# Patient Record
Sex: MALE | Race: WHITE | URBAN - METROPOLITAN AREA
[De-identification: names, ages, dates, MRNs, and addresses within clinical notes are randomized per-mention and may not be internally consistent; named-entity substitution may affect disease eponyms.]

---

## 2019-01-16 ENCOUNTER — APPOINTMENT (RX ONLY)
Dept: URBAN - METROPOLITAN AREA CLINIC 170 | Facility: CLINIC | Age: 49
Setting detail: DERMATOLOGY
End: 2019-01-16

## 2019-01-16 DIAGNOSIS — L81.4 OTHER MELANIN HYPERPIGMENTATION: ICD-10-CM

## 2019-01-16 DIAGNOSIS — L30.0 NUMMULAR DERMATITIS: ICD-10-CM

## 2019-01-16 DIAGNOSIS — D22 MELANOCYTIC NEVI: ICD-10-CM

## 2019-01-16 DIAGNOSIS — L91.8 OTHER HYPERTROPHIC DISORDERS OF THE SKIN: ICD-10-CM

## 2019-01-16 PROBLEM — I10 ESSENTIAL (PRIMARY) HYPERTENSION: Status: ACTIVE | Noted: 2019-01-16

## 2019-01-16 PROBLEM — D22.5 MELANOCYTIC NEVI OF TRUNK: Status: ACTIVE | Noted: 2019-01-16

## 2019-01-16 PROCEDURE — ? COUNSELING

## 2019-01-16 PROCEDURE — ? ADDITIONAL NOTES

## 2019-01-16 PROCEDURE — ? PRESCRIPTION

## 2019-01-16 PROCEDURE — ? BENIGN DESTRUCTION COSMETIC

## 2019-01-16 PROCEDURE — 99202 OFFICE O/P NEW SF 15 MIN: CPT

## 2019-01-16 RX ORDER — FLUOCINONIDE 0.5 MG/G
OINTMENT TOPICAL
Qty: 1 | Refills: 2 | Status: ERX | COMMUNITY
Start: 2019-01-16

## 2019-01-16 RX ADMIN — FLUOCINONIDE: 0.5 OINTMENT TOPICAL at 19:29

## 2019-01-16 ASSESSMENT — LOCATION SIMPLE DESCRIPTION DERM
LOCATION SIMPLE: LEFT THIGH
LOCATION SIMPLE: RIGHT THIGH
LOCATION SIMPLE: ABDOMEN
LOCATION SIMPLE: CHEST
LOCATION SIMPLE: GROIN

## 2019-01-16 ASSESSMENT — LOCATION ZONE DERM
LOCATION ZONE: TRUNK
LOCATION ZONE: LEG

## 2019-01-16 ASSESSMENT — LOCATION DETAILED DESCRIPTION DERM
LOCATION DETAILED: LEFT ANTERIOR MEDIAL PROXIMAL THIGH
LOCATION DETAILED: RIGHT ANTERIOR MEDIAL PROXIMAL THIGH
LOCATION DETAILED: EPIGASTRIC SKIN
LOCATION DETAILED: SUPRAPUBIC SKIN
LOCATION DETAILED: LOWER STERNUM

## 2019-01-16 NOTE — PROCEDURE: BENIGN DESTRUCTION COSMETIC
Anesthesia Type: 2% Xylocaine with 1:100,000 epinephrine
Detail Level: Detailed
Anesthesia Volume In Cc: 0
Price (Use Numbers Only, No Special Characters Or $): 60
Post-Care Instructions: I reviewed with the patient in detail post-care instructions. Patient is to wear sunprotection, and avoid picking at any of the treated lesions. Pt may apply Vaseline to crusted or scabbing areas.
Consent: The patient's consent was obtained including but not limited to risks of crusting, scabbing, blistering, scarring, darker or lighter pigmentary change, recurrence, incomplete removal and infection.

## 2019-01-16 NOTE — HPI: RASH
What Type Of Note Output Would You Prefer (Optional)?: Standard Output
How Severe Is Your Rash?: moderate
Is This A New Presentation, Or A Follow-Up?: Rash
Additional History: Every spring patient gets vasculitis and every winter it gets red scaly and itches. Triamcinolone ointment twice a day with no relief. \\n\\nRight hand middle finger first time this winter peeling, burning, painful.  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 \\n\\nAlso has irritated skin tags wants removed. Very aggravating.

## 2019-01-24 ENCOUNTER — OFFICE VISIT (OUTPATIENT)
Dept: PULMONOLOGY | Age: 49
End: 2019-01-24
Payer: COMMERCIAL

## 2019-01-24 VITALS
DIASTOLIC BLOOD PRESSURE: 84 MMHG | WEIGHT: 315 LBS | HEART RATE: 85 BPM | SYSTOLIC BLOOD PRESSURE: 129 MMHG | TEMPERATURE: 98.6 F | RESPIRATION RATE: 18 BRPM | HEIGHT: 76 IN | OXYGEN SATURATION: 94 % | BODY MASS INDEX: 38.36 KG/M2

## 2019-01-24 DIAGNOSIS — G47.33 OSA (OBSTRUCTIVE SLEEP APNEA): Primary | ICD-10-CM

## 2019-01-24 DIAGNOSIS — Z72.821 INADEQUATE SLEEP HYGIENE: ICD-10-CM

## 2019-01-24 DIAGNOSIS — E66.01 MORBID OBESITY (HCC): ICD-10-CM

## 2019-01-24 DIAGNOSIS — I10 ESSENTIAL HYPERTENSION: ICD-10-CM

## 2019-01-24 PROCEDURE — 99244 OFF/OP CNSLTJ NEW/EST MOD 40: CPT | Performed by: INTERNAL MEDICINE

## 2019-01-24 RX ORDER — LOSARTAN POTASSIUM 50 MG/1
50 TABLET ORAL DAILY
COMMUNITY
End: 2020-06-01 | Stop reason: CLARIF

## 2019-01-24 ASSESSMENT — SLEEP AND FATIGUE QUESTIONNAIRES
HOW LIKELY ARE YOU TO NOD OFF OR FALL ASLEEP WHILE SITTING INACTIVE IN A PUBLIC PLACE: 3
HOW LIKELY ARE YOU TO NOD OFF OR FALL ASLEEP WHILE SITTING AND TALKING TO SOMEONE: 2
HOW LIKELY ARE YOU TO NOD OFF OR FALL ASLEEP IN A CAR, WHILE STOPPED FOR A FEW MINUTES IN TRAFFIC: 2
HOW LIKELY ARE YOU TO NOD OFF OR FALL ASLEEP WHILE WATCHING TV: 3
ESS TOTAL SCORE: 22
HOW LIKELY ARE YOU TO NOD OFF OR FALL ASLEEP WHILE SITTING AND READING: 3
HOW LIKELY ARE YOU TO NOD OFF OR FALL ASLEEP WHILE LYING DOWN TO REST IN THE AFTERNOON WHEN CIRCUMSTANCES PERMIT: 3
HOW LIKELY ARE YOU TO NOD OFF OR FALL ASLEEP WHEN YOU ARE A PASSENGER IN A CAR FOR AN HOUR WITHOUT A BREAK: 3
HOW LIKELY ARE YOU TO NOD OFF OR FALL ASLEEP WHILE SITTING QUIETLY AFTER LUNCH WITHOUT ALCOHOL: 3
NECK CIRCUMFERENCE (INCHES): 19

## 2019-02-06 ENCOUNTER — HOSPITAL ENCOUNTER (OUTPATIENT)
Dept: SLEEP CENTER | Age: 49
Discharge: HOME | End: 2019-02-08
Payer: COMMERCIAL

## 2019-02-06 DIAGNOSIS — E66.01 MORBID OBESITY (HCC): ICD-10-CM

## 2019-02-06 DIAGNOSIS — G47.33 OSA (OBSTRUCTIVE SLEEP APNEA): ICD-10-CM

## 2019-02-06 PROCEDURE — 95806 SLEEP STUDY UNATT&RESP EFFT: CPT

## 2019-02-07 PROCEDURE — 95806 SLEEP STUDY UNATT&RESP EFFT: CPT

## 2019-02-14 ENCOUNTER — OFFICE VISIT (OUTPATIENT)
Dept: PULMONOLOGY | Age: 49
End: 2019-02-14
Payer: COMMERCIAL

## 2019-02-14 VITALS
TEMPERATURE: 98.1 F | HEIGHT: 76 IN | OXYGEN SATURATION: 96 % | HEART RATE: 72 BPM | DIASTOLIC BLOOD PRESSURE: 84 MMHG | WEIGHT: 315 LBS | SYSTOLIC BLOOD PRESSURE: 134 MMHG | RESPIRATION RATE: 16 BRPM | BODY MASS INDEX: 38.36 KG/M2

## 2019-02-14 DIAGNOSIS — E66.01 MORBID OBESITY (HCC): ICD-10-CM

## 2019-02-14 DIAGNOSIS — G47.33 OSA (OBSTRUCTIVE SLEEP APNEA): Primary | ICD-10-CM

## 2019-02-14 DIAGNOSIS — I10 ESSENTIAL HYPERTENSION: ICD-10-CM

## 2019-02-14 DIAGNOSIS — Z72.821 INADEQUATE SLEEP HYGIENE: ICD-10-CM

## 2019-02-14 PROCEDURE — 99213 OFFICE O/P EST LOW 20 MIN: CPT | Performed by: INTERNAL MEDICINE

## 2019-02-14 ASSESSMENT — SLEEP AND FATIGUE QUESTIONNAIRES
HOW LIKELY ARE YOU TO NOD OFF OR FALL ASLEEP IN A CAR, WHILE STOPPED FOR A FEW MINUTES IN TRAFFIC: 1
HOW LIKELY ARE YOU TO NOD OFF OR FALL ASLEEP WHILE LYING DOWN TO REST IN THE AFTERNOON WHEN CIRCUMSTANCES PERMIT: 2
HOW LIKELY ARE YOU TO NOD OFF OR FALL ASLEEP WHILE SITTING AND READING: 2
HOW LIKELY ARE YOU TO NOD OFF OR FALL ASLEEP WHILE SITTING INACTIVE IN A PUBLIC PLACE: 1
HOW LIKELY ARE YOU TO NOD OFF OR FALL ASLEEP WHILE SITTING AND TALKING TO SOMEONE: 1
ESS TOTAL SCORE: 15
HOW LIKELY ARE YOU TO NOD OFF OR FALL ASLEEP WHILE SITTING QUIETLY AFTER LUNCH WITHOUT ALCOHOL: 2
HOW LIKELY ARE YOU TO NOD OFF OR FALL ASLEEP WHILE WATCHING TV: 3
HOW LIKELY ARE YOU TO NOD OFF OR FALL ASLEEP WHEN YOU ARE A PASSENGER IN A CAR FOR AN HOUR WITHOUT A BREAK: 3
NECK CIRCUMFERENCE (INCHES): 19

## 2019-04-01 ENCOUNTER — APPOINTMENT (RX ONLY)
Dept: URBAN - METROPOLITAN AREA CLINIC 170 | Facility: CLINIC | Age: 49
Setting detail: DERMATOLOGY
End: 2019-04-01

## 2019-04-01 DIAGNOSIS — L30.0 NUMMULAR DERMATITIS: ICD-10-CM

## 2019-04-01 DIAGNOSIS — L30.8 OTHER SPECIFIED DERMATITIS: ICD-10-CM

## 2019-04-01 PROCEDURE — ? COUNSELING

## 2019-04-01 PROCEDURE — 99213 OFFICE O/P EST LOW 20 MIN: CPT

## 2019-04-01 PROCEDURE — ? PRESCRIPTION

## 2019-04-01 PROCEDURE — ? PRESCRIPTION SAMPLES PROVIDED

## 2019-04-01 PROCEDURE — ? ADDITIONAL NOTES

## 2019-04-01 PROCEDURE — ? PRESCRIPTION MEDICATION MANAGEMENT

## 2019-04-01 RX ORDER — CLOBETASOL PROPIONATE 0.5 MG/G
OINTMENT TOPICAL
Qty: 1 | Refills: 1 | Status: ERX | COMMUNITY
Start: 2019-04-01

## 2019-04-01 RX ADMIN — CLOBETASOL PROPIONATE: 0.5 OINTMENT TOPICAL at 13:00

## 2019-04-01 ASSESSMENT — LOCATION SIMPLE DESCRIPTION DERM
LOCATION SIMPLE: LEFT PRETIBIAL REGION
LOCATION SIMPLE: LEFT CALF
LOCATION SIMPLE: RIGHT CALF
LOCATION SIMPLE: RIGHT PRETIBIAL REGION
LOCATION SIMPLE: RIGHT MIDDLE FINGER

## 2019-04-01 ASSESSMENT — SEVERITY ASSESSMENT: SEVERITY: MILD TO MODERATE

## 2019-04-01 ASSESSMENT — LOCATION ZONE DERM
LOCATION ZONE: LEG
LOCATION ZONE: FINGER

## 2019-04-01 ASSESSMENT — LOCATION DETAILED DESCRIPTION DERM
LOCATION DETAILED: RIGHT DISTAL CALF
LOCATION DETAILED: LEFT DISTAL PRETIBIAL REGION
LOCATION DETAILED: RIGHT PROXIMAL PRETIBIAL REGION
LOCATION DETAILED: LEFT DISTAL CALF
LOCATION DETAILED: LEFT PROXIMAL PRETIBIAL REGION
LOCATION DETAILED: RIGHT DISTAL PRETIBIAL REGION
LOCATION DETAILED: RIGHT MIDDLE FINGERTIP

## 2019-04-01 NOTE — PROCEDURE: ADDITIONAL NOTES
Detail Level: Detailed
Additional Notes: Recommend latex free glove for day/night occlusion and as barrier under leather gloves. May consider PTT in future.

## 2019-04-01 NOTE — HPI: RASH
What Type Of Note Output Would You Prefer (Optional)?: Bullet Format
How Severe Is Your Rash?: mild
Is This A New Presentation, Or A Follow-Up?: Follow Up Rash
Additional History: No help with the ointment  gave him, it is making the rash worse. Patient is now blistering. 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 Wears leather gloves every day.

## 2019-04-17 ENCOUNTER — OFFICE VISIT (OUTPATIENT)
Dept: PULMONOLOGY | Age: 49
End: 2019-04-17
Payer: COMMERCIAL

## 2019-04-17 VITALS
BODY MASS INDEX: 38.36 KG/M2 | RESPIRATION RATE: 16 BRPM | HEIGHT: 76 IN | TEMPERATURE: 98.1 F | HEART RATE: 69 BPM | WEIGHT: 315 LBS | OXYGEN SATURATION: 98 % | SYSTOLIC BLOOD PRESSURE: 139 MMHG | DIASTOLIC BLOOD PRESSURE: 89 MMHG

## 2019-04-17 DIAGNOSIS — G47.33 OSA (OBSTRUCTIVE SLEEP APNEA): Primary | ICD-10-CM

## 2019-04-17 DIAGNOSIS — I10 ESSENTIAL HYPERTENSION: ICD-10-CM

## 2019-04-17 DIAGNOSIS — E66.01 MORBID OBESITY (HCC): ICD-10-CM

## 2019-04-17 PROCEDURE — 99213 OFFICE O/P EST LOW 20 MIN: CPT | Performed by: INTERNAL MEDICINE

## 2019-04-17 RX ORDER — ALBUTEROL SULFATE 90 UG/1
AEROSOL, METERED RESPIRATORY (INHALATION)
COMMUNITY
End: 2019-04-17

## 2019-04-17 RX ORDER — FLUTICASONE PROPIONATE 50 MCG
SPRAY, SUSPENSION (ML) NASAL
COMMUNITY
End: 2019-04-17

## 2019-04-17 ASSESSMENT — SLEEP AND FATIGUE QUESTIONNAIRES
NECK CIRCUMFERENCE (INCHES): 19
HOW LIKELY ARE YOU TO NOD OFF OR FALL ASLEEP WHILE SITTING AND TALKING TO SOMEONE: 1
HOW LIKELY ARE YOU TO NOD OFF OR FALL ASLEEP WHILE SITTING AND READING: 3
HOW LIKELY ARE YOU TO NOD OFF OR FALL ASLEEP IN A CAR, WHILE STOPPED FOR A FEW MINUTES IN TRAFFIC: 0
HOW LIKELY ARE YOU TO NOD OFF OR FALL ASLEEP WHILE SITTING QUIETLY AFTER LUNCH WITHOUT ALCOHOL: 0
HOW LIKELY ARE YOU TO NOD OFF OR FALL ASLEEP WHILE SITTING INACTIVE IN A PUBLIC PLACE: 1
ESS TOTAL SCORE: 13
HOW LIKELY ARE YOU TO NOD OFF OR FALL ASLEEP WHILE WATCHING TV: 3
HOW LIKELY ARE YOU TO NOD OFF OR FALL ASLEEP WHILE LYING DOWN TO REST IN THE AFTERNOON WHEN CIRCUMSTANCES PERMIT: 3
HOW LIKELY ARE YOU TO NOD OFF OR FALL ASLEEP WHEN YOU ARE A PASSENGER IN A CAR FOR AN HOUR WITHOUT A BREAK: 2

## 2019-04-17 NOTE — PROGRESS NOTES
HISTORY OF PRESENT ILLNESS: Miriam Aldana is a 47y.o. year old male with a history of hypertension, obesity who presents for follow-up for ADALI. His PCP is Kulwant Lebron PA-C. Domenic was placed on auto CPAP, returns for CPAP compliance visit. The patient has been working long hours, barely has 6 hours of sleep. He sometimes gets irritated and takes his CPAP off. His wife sleeps with CPAP, is using a different headgear and a nasal interface, which he wishes to try. He has made some changes in his diet, has lost 5 pounds. The patient says he can still sleep in a car in the daytime if he is a passenger. There is no other change in his medications, medical or surgical history. CPAP compliance data 3/18 through 4/16/19  Usage >4 hours 25/30 days, 83%. 95th percentile pressure was 14.4 cm H2O, AHI 2.0. Previous notes reviewed and edited as necessary. Domenic underwent a home sleep study, results as below. He continues to have nasal congestion, but mentions this is more of a cold in the wong and not true allergies. He also has \"sinus headaches\" in the mornings, almost the same day of the week. There has been no other change in his medications, medical or surgical history. He started treatment for hypertension only recently. HST 2/6/19  Severe sleep apnea with an AHI of 48.5 per hour. Low oxygen saturation 69%, time spent with saturation below 89% 93.7 minutes. He had 15 apneas and 304 hypopneas. Previous notes reviewed and edited as necessary. Mark Anthony Ken is a pleasant 69-year-old male, presently  from his wife lives in [facility]. He has his own lawn care business. He has a long history of loud snoring, possible apneas and was recommended to see a sleep physician by his wife. Presently the patient goes to bed somewhere between 10 PM and midnight, he does watch television after bedtime, but not in bed. He does read on his phone if he is unable to sleep.   Once asleep, he sometimes woken up by right hip pain after one hour. He has to get up to go to the bathroom once or twice a night. He wakes up between 6 and 7 AM, his dog wakes him up. He does not feel rested, but works all day, all days of the week and month unless he is rained out. 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 The patient has sleepiness while driving, has pulled over and take a nap occasionally. He has dozed off while driving, but has not had an MVA. During this season, he works from daylight to dark. He has a good appetite, denies any weight gain recently, over the last several years has gained 10-15 pounds. He has no GI or  complaints. He does have allergic rhinitis with stuffy nose and the wong. Last summer once a week on Sundays he would wake up with a \"migraine\" with severe headache and blurry vision. This necessitated ER visits twice or thrice. He recently underwent elbow surgery and he has completed an EGD and colonoscopy with Dr. Gonzalez Patten. He is being treated for hypertension, takes over-the-counter medication for GE reflux which only presents when he has a large meal.     The patient has 2 children, live with their mother.       [place] Sleepiness Scale:    Sleep Medicine 4/17/2019 2/14/2019 1/24/2019   Sitting and reading 3 2 3   Watching TV 3 3 3   Sitting, inactive in a public place (e.g. a theatre or a meeting) 1 1 3   As a passenger in a car for an hour without a break 2 3 3   Lying down to rest in the afternoon when circumstances permit 3 2 3   Sitting and talking to someone 1 1 2   Sitting quietly after a lunch without alcohol 0 2 3   In a car, while stopped for a few minutes in traffic 0 1 2   Total score 13 15 22   Neck circumference 19 19 19       PAST MEDICAL HISTORY:  Past Medical History:   Diagnosis Date    Cholesterol serum elevated     Depression     Reflux        PAST SURGICAL HISTORY:  Past Surgical History:   Procedure Laterality Date    ANTERIOR CRUCIATE LIGAMENT REPAIR      CARPAL TUNNEL RELEASE  11/15/10    right    CARPAL TUNNEL RELEASE  12/6/10    KNEE ARTHROSCOPY      TONSILLECTOMY      UMBILICAL HERNIA REPAIR N/A 12/22/14    LAPAROSCOPIC WITH MESH       FAMILY HISTORY:  family history includes Diabetes in his brother and father; Heart Disease in his brother; Hypertension in his father; Hyperthyroidism in his daughter; Tonia Raring in his mother; Stroke in his father. SOCIAL HISTORY:   reports that he has never smoked. He has never used smokeless tobacco.      ALLERGIES:  Patient is allergic to bupropion; doxepin; venlafaxine; and wellbutrin [bupropion hcl]. REVIEW OF SYSTEMS:  Constitutional: Negative for fever, positive for weight loss, about 5 pounds  HENT: Negative for sore throat, positive for nasal congestion  Eyes: Negative for redness   Respiratory: Negative for dyspnea, cough  Cardiovascular: Negative for chest pain  Gastrointestinal: Negative for vomiting, diarrhea   Genitourinary: Negative for hematuria   Musculoskeletal: Positive for arthralgias   Skin: Negative for rash  Neurological: Negative for syncope, positive for headaches  Hematological: Negative for adenopathy  Psychiatric/Behavorial: Negative for anxiety. Had depression for 5 years ago    Objective:   PHYSICAL EXAM:  Blood pressure 139/89, pulse 69, temperature 98.1 °F (36.7 °C), temperature source Oral, resp. rate 16, height 6' 4\" (1.93 m), weight (!) 333 lb (151 kg), SpO2 98 %.'  Gen: Very pleasant, tall, well-built and obese. No distress. Eyes: No sclera icterus. No conjunctival injection. ENT: No discharge. Pharynx clear. External appearance of ears and nose normal. Mallampati III/IV, limited mouth opening  Neck: Short and large neck, no JVD. Resp: No accessory muscle use. No crackles. No wheezes. No rhonchi. CV: Regular rate. Regular rhythm. No murmur or rub. No edema. GI: Deferred. Skin: Warm, dry, normal texture and turgor. No nodule on exposed extremities. Lymph: Deferred.    M/S: No cyanosis. No clubbing. No joint deformity. Neuro: Moves all four extremities. Psych: Oriented x 3. No anxiety. Awake. Alert. Intact judgement and insight. Current Outpatient Medications   Medication Sig Dispense Refill    losartan (COZAAR) 50 MG tablet Take 50 mg by mouth daily       No current facility-administered medications for this visit. Data Reviewed:   CBC and Renal reviewed  Last CBC  Lab Results   Component Value Date    WBC 8.5 12/22/2014    RBC 5.26 12/22/2014    HGB 15.4 12/22/2014    MCV 84.6 12/22/2014     12/22/2014     Last Renal  Lab Results   Component Value Date     12/22/2014    K 4.1 12/22/2014     12/22/2014    CO2 23 12/22/2014    BUN 16 12/22/2014    CREATININE 0.6 12/22/2014    GLUCOSE 107 12/22/2014    CALCIUM 9.0 12/22/2014         Assessment:     · ADALI  · Inadequate sleep hygiene  · Essential hypertension  · Morbid obesity    Plan:      1. ADALI (obstructive sleep apnea)  Reasonable compliance, excellent response with AHI drop from 48.5 per hour 22 episodes per hour. He may try the nasal interface with chinstrap, will talk to his [employer] company about the kind of mask that is best for him. 2. Inadequate sleep hygiene  Does not watch television in bed, falls asleep easily     3. Essential hypertension  Treatment of ADALI could help his blood pressure after several months of therapy. 4. Morbid obesity ([address]  Is making attempts to lose weight, discussed diet modification with him again. 5.  Prophylaxis. Should get the flu shot annually.     RTC annually

## 2019-04-17 NOTE — LETTER
[facility]  [address] [place] [address]  [address]  Phone: [phone]  Fax: [phone]    Tess Augustin        April 18, 2019       Patient: Antonia Duarte   MR Number: [id number]   YOB: 1970   Date of Visit: 4/17/2019       Dear Willy Ordonez: Antonia Duarte was seen in follow-up today. Below are the relevant portions of my assessment and plan of care. If you have questions, please do not hesitate to call me. I look forward to following Domenic along with you.     Sincerely,        Natalie Harris MD    CC providers:  Kaylin Sapp PA-C  [address] [address]: [phone]

## 2019-05-06 ENCOUNTER — APPOINTMENT (RX ONLY)
Dept: URBAN - METROPOLITAN AREA CLINIC 170 | Facility: CLINIC | Age: 49
Setting detail: DERMATOLOGY
End: 2019-05-06

## 2019-05-06 DIAGNOSIS — L30.8 OTHER SPECIFIED DERMATITIS: ICD-10-CM | Status: IMPROVED

## 2019-05-06 DIAGNOSIS — L30.0 NUMMULAR DERMATITIS: ICD-10-CM | Status: IMPROVED

## 2019-05-06 PROCEDURE — ? PRESCRIPTION MEDICATION MANAGEMENT

## 2019-05-06 PROCEDURE — 99213 OFFICE O/P EST LOW 20 MIN: CPT

## 2019-05-06 PROCEDURE — ? ADDITIONAL NOTES

## 2019-05-06 PROCEDURE — ? COUNSELING

## 2019-05-06 ASSESSMENT — LOCATION DETAILED DESCRIPTION DERM
LOCATION DETAILED: LEFT DISTAL PRETIBIAL REGION
LOCATION DETAILED: RIGHT MIDDLE FINGERTIP
LOCATION DETAILED: RIGHT DISTAL PRETIBIAL REGION
LOCATION DETAILED: LEFT DISTAL CALF
LOCATION DETAILED: RIGHT PROXIMAL PRETIBIAL REGION
LOCATION DETAILED: RIGHT DISTAL CALF
LOCATION DETAILED: LEFT PROXIMAL PRETIBIAL REGION

## 2019-05-06 ASSESSMENT — SEVERITY ASSESSMENT
SEVERITY: CLEAR
SEVERITY: ALMOST CLEAR

## 2019-05-06 ASSESSMENT — LOCATION ZONE DERM
LOCATION ZONE: FINGER
LOCATION ZONE: LEG

## 2019-05-06 ASSESSMENT — LOCATION SIMPLE DESCRIPTION DERM
LOCATION SIMPLE: RIGHT PRETIBIAL REGION
LOCATION SIMPLE: RIGHT CALF
LOCATION SIMPLE: LEFT PRETIBIAL REGION
LOCATION SIMPLE: RIGHT MIDDLE FINGER
LOCATION SIMPLE: LEFT CALF

## 2019-05-06 NOTE — PROCEDURE: PRESCRIPTION MEDICATION MANAGEMENT
Render In Strict Bullet Format?: No
Plan: If continue to flare, plan PTT. Pt declined at this time.
Otc Regimen: Continue Aquaphor BID-TID to hands
Discontinue Regimen: Clobetasol 0.05% oint for 1 week then PRN flares
Detail Level: Simple
Continue Regimen: Aquaphor ointment BID
Discontinue Regimen: Clobetasol ointment

## 2020-03-17 ENCOUNTER — TELEPHONE (OUTPATIENT)
Dept: PULMONOLOGY | Age: 50
End: 2020-03-17

## 2020-03-17 NOTE — TELEPHONE ENCOUNTER
Patient cancelled appointment on 3/23/20 with Dr. Lyndsey Montague for 1 year ADALI fu.    Reason: COVID (office cancelled)    Patient did reschedule appointment. Appointment rescheduled for 6/13/20.

## 2020-04-30 ENCOUNTER — TELEPHONE (OUTPATIENT)
Dept: PULMONOLOGY | Age: 50
End: 2020-04-30

## 2020-06-01 ENCOUNTER — TELEPHONE (OUTPATIENT)
Dept: PULMONOLOGY | Age: 50
End: 2020-06-01

## 2020-06-01 ENCOUNTER — VIRTUAL VISIT (OUTPATIENT)
Dept: PULMONOLOGY | Age: 50
End: 2020-06-01
Payer: COMMERCIAL

## 2020-06-01 PROCEDURE — 3017F COLORECTAL CA SCREEN DOC REV: CPT | Performed by: INTERNAL MEDICINE

## 2020-06-01 PROCEDURE — 1036F TOBACCO NON-USER: CPT | Performed by: INTERNAL MEDICINE

## 2020-06-01 PROCEDURE — G8421 BMI NOT CALCULATED: HCPCS | Performed by: INTERNAL MEDICINE

## 2020-06-01 PROCEDURE — 99213 OFFICE O/P EST LOW 20 MIN: CPT | Performed by: INTERNAL MEDICINE

## 2020-06-01 PROCEDURE — G8427 DOCREV CUR MEDS BY ELIG CLIN: HCPCS | Performed by: INTERNAL MEDICINE

## 2020-06-01 RX ORDER — VALSARTAN 320 MG/1
320 TABLET ORAL DAILY
COMMUNITY

## 2020-06-01 ASSESSMENT — SLEEP AND FATIGUE QUESTIONNAIRES
HOW LIKELY ARE YOU TO NOD OFF OR FALL ASLEEP WHILE SITTING QUIETLY AFTER LUNCH WITHOUT ALCOHOL: 0
HOW LIKELY ARE YOU TO NOD OFF OR FALL ASLEEP WHILE LYING DOWN TO REST IN THE AFTERNOON WHEN CIRCUMSTANCES PERMIT: 0
HOW LIKELY ARE YOU TO NOD OFF OR FALL ASLEEP WHEN YOU ARE A PASSENGER IN A CAR FOR AN HOUR WITHOUT A BREAK: 1
HOW LIKELY ARE YOU TO NOD OFF OR FALL ASLEEP IN A CAR, WHILE STOPPED FOR A FEW MINUTES IN TRAFFIC: 0
HOW LIKELY ARE YOU TO NOD OFF OR FALL ASLEEP WHILE SITTING INACTIVE IN A PUBLIC PLACE: 1
HOW LIKELY ARE YOU TO NOD OFF OR FALL ASLEEP WHILE WATCHING TV: 0
ESS TOTAL SCORE: 4
HOW LIKELY ARE YOU TO NOD OFF OR FALL ASLEEP WHILE SITTING AND READING: 1
HOW LIKELY ARE YOU TO NOD OFF OR FALL ASLEEP WHILE SITTING AND TALKING TO SOMEONE: 1

## 2020-06-01 NOTE — TELEPHONE ENCOUNTER
the provision of medical care and treatment via Telehealth and the Service and you may not be able to receive diagnosis and/or treatment through the Service for every condition for which you seek diagnosis and/or treatment. 11. There are potential risks to the use of Telehealth, including but not limited to the risks described in this Telehealth Consent. 12. Your Provider(s) have discussed the use of Telehealth and the Service with you, including the benefits and risks of such and you have provided oral consent to your Provider(s) for the use of Telehealth and the Service. [address]. You understand that it is your duty to provide your Provider(s) truthful, accurate and complete information, including all relevant information regarding care that you may have received or may be receiving from other healthcare providers outside of the Service. 14. You understand that each of your Provider(s) may determine in his or sole discretion that your condition is not suitable for diagnosis and/or treatment using the Service, and that you may need to seek medical care and treatment a specialist or other healthcare provider, outside of the Service. 15. You understand that you are fully responsible for payment for all services provided by Provider(s) or through use of the Service and that you may not be able to use third-party insurance. 16. You represent that (a) you have read this Telehealth Consent carefully, (b) you understand the risks and benefits of the Service and the use of Telehealth in the medical care and treatment provided to you by Provider(s) using the Service, and (c) you have the legal capacity and authority to provide this consent for yourself and/or the minor for which you are consenting under applicable federal and state laws, including laws relating to the age of [de-identified] and/or parental/guardian consent.    17. You give your informed consent to the use of Telehealth by Provider(s) using the Service under

## 2020-06-01 NOTE — PROGRESS NOTES
HISTORY OF PRESENT ILLNESS: Kole Otero is a 45y.o. year old male with a history of hypertension, obesity who presents for virtual annual follow-up visit through telephone through [employer] me, was not able to turn his camera on for a video visit through [employer] me. Follow up visit is for ADALI, morbid obesity. His PCP is Harsha Hill PA-C. Denilson Rodríguez was last seen on 4/17/2019. He had met CPAP compliance with usage for >4 hours of 83%. 95th percentile pressure was 14.4 cm H2O, AHI was 2.0. Since his last visit, there has been no change in his medical or surgical history. He has not seen his PCP recently, has had no investigations done. The patient uses his CPAP every night. 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 He goes to bed between 1030 and 11 PM, awakens around 6 AM.  He does not watch TV prior to going to bed. The patient is no longer tired or sleepy in the daytime. He does wake up with a dry mouth, is not very sure about adjusting the humidity. He also needs new headgear. His [employer] company was [employer] in [facility]. Due to his wife's change of insurance, he plans to move to [employer]. He has a good appetite, denies GI or  complaints. He has no new medical problems or medications. His allergies and medications were reviewed. CPAP compliance data 3/3 through 5/31/2020  Usage 90/90 days, 100%. Usage > 4 hours 82 days, 91%. AHI 2.2, 95th percentile pressure 11.9 cm H2O. Previous notes reviewed and edited as necessary. Domenic was placed on auto CPAP, returns for CPAP compliance visit. The patient has been working long hours, barely has 6 hours of sleep. He sometimes gets irritated and takes his CPAP off. His wife sleeps with CPAP, is using a different headgear and a nasal interface, which he wishes to try. He has made some changes in his diet, has lost 5 pounds. The patient says he can still sleep in a car in the daytime if he is a passenger.   There is no other change in his medications, medical or surgical history. CPAP compliance data 3/18 through 4/16/19  Usage >4 hours 25/30 days, 83%. 95th percentile pressure was 14.4 cm H2O, AHI 2.0. Previous notes reviewed and edited as necessary. Domenic underwent a home sleep study, results as below. He continues to have nasal congestion, but mentions this is more of a cold in the wong and not true allergies. He also has \"sinus headaches\" in the mornings, almost the same day of the week. There has been no other change in his medications, medical or surgical history. He started treatment for hypertension only recently. HST 2/6/19  Severe sleep apnea with an AHI of 48.5 per hour. Low oxygen saturation 69%, time spent with saturation below 89% 93.7 minutes. He had 15 apneas and 304 hypopneas. Previous notes reviewed and edited as necessary. Angelique Sanches is a pleasant 67-year-old male, presently  from his wife lives in [facility]. He has his own lawn care business. He has a long history of loud snoring, possible apneas and was recommended to see a sleep physician by his wife. Presently the patient goes to bed somewhere between 10 PM and midnight, he does watch television after bedtime, but not in bed. He does read on his phone if he is unable to sleep. Once asleep, he sometimes woken up by right hip pain after one hour. He has to get up to go to the bathroom once or twice a night. He wakes up between 6 and 7 AM, his dog wakes him up. He does not feel rested, but works all day, all days of the week and month unless he is rained out. 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 The patient has sleepiness while driving, has pulled over and take a nap occasionally. He has dozed off while driving, but has not had an MVA. During this season, he works from daylight to dark. He has a good appetite, denies any weight gain recently, over the last several years has gained 10-15 pounds. He has no GI or  complaints. He does have allergic rhinitis with stuffy nose and the wong. Last summer once a week on Sundays he would wake up with a \"migraine\" with severe headache and blurry vision. This necessitated ER visits twice or thrice. He recently underwent elbow surgery and he has completed an EGD and colonoscopy with Dr. Benji Caldwell. He is being treated for hypertension, takes over-the-counter medication for GE reflux which only presents when he has a large meal.     The patient has 2 children, live with their mother. [place] Sleepiness Scale:    Sleep Medicine 6/1/2020 4/17/2019 2/14/2019 1/24/2019   Sitting and reading 1 3 2 3   Watching TV 0 3 3 3   Sitting, inactive in a public place (e.g. a theatre or a meeting) 1 1 1 3   As a passenger in a car for an hour without a break 1 2 3 3   Lying down to rest in the afternoon when circumstances permit 0 3 2 3   Sitting and talking to someone 1 1 1 2   Sitting quietly after a lunch without alcohol 0 0 2 3   In a car, while stopped for a few minutes in traffic 0 0 1 2   Total score 4 13 15 22   Neck circumference - 19 19 19       PAST MEDICAL HISTORY:  Past Medical History:   Diagnosis Date    Cholesterol serum elevated     Depression     Reflux        PAST SURGICAL HISTORY:  Past Surgical History:   Procedure Laterality Date    ANTERIOR CRUCIATE LIGAMENT REPAIR      CARPAL TUNNEL RELEASE  11/15/10    right    CARPAL TUNNEL RELEASE  12/6/10    KNEE ARTHROSCOPY      TONSILLECTOMY      UMBILICAL HERNIA REPAIR N/A 12/22/14    LAPAROSCOPIC WITH MESH       FAMILY HISTORY:  family history includes Diabetes in his brother and father; Heart Disease in his brother; Hypertension in his father; Hyperthyroidism in his daughter; Bo Corey in his mother; Stroke in his father. SOCIAL HISTORY:   reports that he has never smoked. He has never used smokeless tobacco.      ALLERGIES:  Patient is allergic to bupropion; doxepin; venlafaxine; and wellbutrin [bupropion hcl].     REVIEW OF SYSTEMS:  Constitutional: Negative for fever, stable weight  HENT: Negative for sore throat, positive for nasal congestion, dry mouth  Eyes: Negative for redness   Respiratory: Negative for dyspnea, cough  Cardiovascular: Negative for chest pain  Gastrointestinal: Negative for vomiting, diarrhea   Genitourinary: Negative for hematuria   Musculoskeletal: Positive for arthralgias, oocasional  Skin: Negative for rash  Neurological: Negative for syncope, no longer has headaches as he uses his CPAP every night. Hematological: Negative for adenopathy  Psychiatric/Behavorial: Negative for anxiety. Objective:   PHYSICAL EXAM:  There were no vitals taken for this visit.'  Physical exam could not be performed as this was a virtual telephonic visit, the patient could not turn on his camera for a video visit. Current Outpatient Medications   Medication Sig Dispense Refill    valsartan (DIOVAN) 320 MG tablet Take 320 mg by mouth daily       No current facility-administered medications for this visit. Data Reviewed:   CBC and Renal reviewed  Last CBC  Lab Results   Component Value Date    WBC 8.5 12/22/2014    RBC 5.26 12/22/2014    HGB 15.4 12/22/2014    MCV 84.6 12/22/2014     12/22/2014     Last Renal  Lab Results   Component Value Date     12/22/2014    K 4.1 12/22/2014     12/22/2014    CO2 23 12/22/2014    BUN 16 12/22/2014    CREATININE 0.6 12/22/2014    GLUCOSE 107 12/22/2014    CALCIUM 9.0 12/22/2014         Assessment:     · ADALI  · Essential hypertension  · Morbid obesity    Plan:      1. ADALI (obstructive sleep apnea)  Excellent compliance, good control of his sleep apnea. He was instructed to discuss with his new [employer] company about how to adjust the humidity on his CPAP machine. His equipment cleaning is adequate. He also wants new headgear with the hose going around the back of his head. 2. Inadequate sleep hygiene  No longer watches TV in bed, falls asleep easily    3. Essential hypertension  Per his PCP. 4. Morbid obesity ([address]  Continue to make attempts to lose weight. 5.  Prophylaxis. Should get a flu shot annually. RTC annually, call earlier if issues related to his sleep apnea. Lino Garcia is a 45 y.o. male being evaluated by a Virtual Visit (video visit) encounter to address concerns as mentioned above. A caregiver was present when appropriate. Due to this being a TeleHealth encounter (During [facility] public health emergency), evaluation of the following organ systems was limited: Vitals/Constitutional/EENT/Resp/CV/GI//MS/Neuro/Skin/Heme-Lymph-Imm. Pursuant to the emergency declaration under the [address] [place] [address] authority and the [employer] and [employer] Act, this Virtual Visit was conducted with patient's (and/or legal guardian's) consent, to reduce the patient's risk of exposure to COVID-19 and provide necessary medical care. The patient (and/or legal guardian) has also been advised to contact this office for worsening conditions or problems, and seek emergency medical treatment and/or call 911 if deemed necessary. Patient identification was verified at the start of the visit: Yes    Total time spent for this encounter: 15 minutes    Services were provided through a video synchronous discussion virtually to substitute for in-person clinic visit. Patient and provider were located at their individual homes. --Fili Estes MD on 6/1/2020 at 10:13 PM    An electronic signature was used to authenticate this note.

## 2020-06-01 NOTE — PROGRESS NOTES
MA Communication:   The following orders are received by verbal communication from Darryle Reil, MD    Orders include:  1 year fu scheduled 2/22/21 (pt requested to be seen in Feb 2021)

## 2020-07-29 LAB
CHOLESTEROL, TOTAL: 198 MG/DL (ref 0–199)
GLUCOSE BLD-MCNC: 238 MG/DL (ref 70–99)
HDLC SERPL-MCNC: 30 MG/DL (ref 40–60)
LDL CHOLESTEROL CALCULATED: ABNORMAL MG/DL
LDL CHOLESTEROL DIRECT: 121 MG/DL
TRIGL SERPL-MCNC: 343 MG/DL (ref 0–150)

## 2020-07-30 LAB
ESTIMATED AVERAGE GLUCOSE: 217.3 MG/DL
HBA1C MFR BLD: 9.2 %

## 2020-08-14 ENCOUNTER — TELEPHONE (OUTPATIENT)
Dept: PULMONOLOGY | Age: 50
End: 2020-08-14

## 2020-08-14 NOTE — TELEPHONE ENCOUNTER
Please let patient know his compliance is good, sleep apnea is controlled. Continue with annual follow up. THX.        AN

## 2020-08-18 ENCOUNTER — OFFICE VISIT (OUTPATIENT)
Dept: ENDOCRINOLOGY | Age: 50
End: 2020-08-18
Payer: COMMERCIAL

## 2020-08-18 VITALS
OXYGEN SATURATION: 95 % | HEIGHT: 76 IN | SYSTOLIC BLOOD PRESSURE: 130 MMHG | WEIGHT: 315 LBS | DIASTOLIC BLOOD PRESSURE: 75 MMHG | TEMPERATURE: 97.3 F | BODY MASS INDEX: 38.36 KG/M2 | HEART RATE: 69 BPM | RESPIRATION RATE: 14 BRPM

## 2020-08-18 DIAGNOSIS — E78.2 MIXED HYPERLIPIDEMIA: ICD-10-CM

## 2020-08-18 DIAGNOSIS — G47.33 OSA (OBSTRUCTIVE SLEEP APNEA): ICD-10-CM

## 2020-08-18 PROBLEM — E66.813 CLASS 3 SEVERE OBESITY WITH BODY MASS INDEX (BMI) OF 40.0 TO 44.9 IN ADULT: Status: ACTIVE | Noted: 2020-08-18

## 2020-08-18 PROBLEM — E66.01 CLASS 3 SEVERE OBESITY WITH BODY MASS INDEX (BMI) OF 40.0 TO 44.9 IN ADULT (HCC): Status: ACTIVE | Noted: 2020-08-18

## 2020-08-18 PROBLEM — I10 ESSENTIAL HYPERTENSION: Status: ACTIVE | Noted: 2020-08-18

## 2020-08-18 LAB
A/G RATIO: 1.6 (ref 1.1–2.2)
ALBUMIN SERPL-MCNC: 4.2 G/DL (ref 3.4–5)
ALP BLD-CCNC: 56 U/L (ref 40–129)
ALT SERPL-CCNC: 105 U/L (ref 10–40)
ANION GAP SERPL CALCULATED.3IONS-SCNC: 15 MMOL/L (ref 3–16)
AST SERPL-CCNC: 60 U/L (ref 15–37)
BILIRUB SERPL-MCNC: 0.4 MG/DL (ref 0–1)
BUN BLDV-MCNC: 12 MG/DL (ref 7–20)
CALCIUM SERPL-MCNC: 9 MG/DL (ref 8.3–10.6)
CHLORIDE BLD-SCNC: 100 MMOL/L (ref 99–110)
CHOLESTEROL, TOTAL: 178 MG/DL (ref 0–199)
CO2: 22 MMOL/L (ref 21–32)
CREAT SERPL-MCNC: 0.6 MG/DL (ref 0.9–1.3)
CREATININE URINE: 162 MG/DL (ref 39–259)
GFR AFRICAN AMERICAN: >60
GFR NON-AFRICAN AMERICAN: >60
GLOBULIN: 2.7 G/DL
GLUCOSE BLD-MCNC: 134 MG/DL (ref 70–99)
HDLC SERPL-MCNC: 33 MG/DL (ref 40–60)
LDL CHOLESTEROL CALCULATED: 103 MG/DL
MICROALBUMIN UR-MCNC: <1.2 MG/DL
MICROALBUMIN/CREAT UR-RTO: NORMAL MG/G (ref 0–30)
POTASSIUM SERPL-SCNC: 4.3 MMOL/L (ref 3.5–5.1)
SODIUM BLD-SCNC: 137 MMOL/L (ref 136–145)
TOTAL PROTEIN: 6.9 G/DL (ref 6.4–8.2)
TRIGL SERPL-MCNC: 209 MG/DL (ref 0–150)
VLDLC SERPL CALC-MCNC: 42 MG/DL

## 2020-08-18 PROCEDURE — 3017F COLORECTAL CA SCREEN DOC REV: CPT | Performed by: INTERNAL MEDICINE

## 2020-08-18 PROCEDURE — 1036F TOBACCO NON-USER: CPT | Performed by: INTERNAL MEDICINE

## 2020-08-18 PROCEDURE — 3052F HG A1C>EQUAL 8.0%<EQUAL 9.0%: CPT | Performed by: INTERNAL MEDICINE

## 2020-08-18 PROCEDURE — 99204 OFFICE O/P NEW MOD 45 MIN: CPT | Performed by: INTERNAL MEDICINE

## 2020-08-18 PROCEDURE — G8427 DOCREV CUR MEDS BY ELIG CLIN: HCPCS | Performed by: INTERNAL MEDICINE

## 2020-08-18 PROCEDURE — G8417 CALC BMI ABV UP PARAM F/U: HCPCS | Performed by: INTERNAL MEDICINE

## 2020-08-18 PROCEDURE — 2022F DILAT RTA XM EVC RTNOPTHY: CPT | Performed by: INTERNAL MEDICINE

## 2020-08-18 RX ORDER — GLIPIZIDE 5 MG/1
10 TABLET ORAL 2 TIMES DAILY
Qty: 120 TABLET | Refills: 0 | Status: SHIPPED | OUTPATIENT
Start: 2020-08-18 | End: 2020-09-03

## 2020-08-18 NOTE — PROGRESS NOTES
Javon Sawyer is a 45 y.o. male who is being evaluated for Type 2 diabetes mellitus. Current symptoms/problems include hyperglycemia and are worsening. Diabetes mellitus type 2, uncontrolled, without complications ([facility] [Y12.53]    Diagnosed with Type 2 diabetes mellitus in 2020. Comorbid conditions: hyperlipidemia, HTN    Current diabetic medications include: Metformin    Intolerance to diabetes medications: Yes Metformin severe diarrhea     Weight trend: stable  Prior visit with dietician: yes  Current diet: on average, 2 meals per day  Current exercise: walking     Current monitoring regimen: home blood tests - 1 times daily  Has brought blood glucose log/meter:  No  Home blood sugar records: fasting range: 200s and postprandial range: 200s   Any episodes of hypoglycemia? No  Hypoglycemia frequency and time(s):    Does patient have Glucagon emergency kit? No  Does patient have rapid acting carbohydrate? No  Does patient wear a medic alert bracelet or necklace? No    2. Mixed hyperlipidemia  No muscle pain    3. Class 3 severe obesity with serious comorbidity and body mass index (BMI) of 40.0 to 44.9 in adult, unspecified obesity type ([address]  Started eating healthier    4.  Essential hypertension  Has headaches      Past Medical History:   Diagnosis Date    Cholesterol serum elevated     Depression     Diabetes mellitus ([address]     Hypertension     Reflux       Patient Active Problem List   Diagnosis    Diabetes mellitus type 2, uncontrolled, without complications ([address]    Mixed hyperlipidemia    Class 3 severe obesity with body mass index (BMI) of 40.0 to 44.9 in adult [facility])    Essential hypertension     Past Surgical History:   Procedure Laterality Date    ANTERIOR CRUCIATE LIGAMENT REPAIR      CARPAL TUNNEL RELEASE  11/15/10    right    CARPAL TUNNEL RELEASE  12/6/10    KNEE ARTHROSCOPY      TONSILLECTOMY      UMBILICAL HERNIA REPAIR N/A 12/22/14    LAPAROSCOPIC WITH MESH     Social History Socioeconomic History    Marital status:      Spouse name: Not on file    Number of children: Not on file    Years of education: Not on file    Highest education level: Not on file   Occupational History    Not on file   Social Needs    Financial resource strain: Not on file    Food insecurity     Worry: Not on file     Inability: Not on file    Transportation needs     Medical: Not on file     Non-medical: Not on file   Tobacco Use    Smoking status: Never Smoker    Smokeless tobacco: Never Used   Substance and Sexual Activity    Alcohol use: Yes     Comment: Very occasional    Drug use: No    Sexual activity: Not on file   Lifestyle    Physical activity     Days per week: Not on file     Minutes per session: Not on file    Stress: Not on file   Relationships    Social connections     Talks on phone: Not on file     Gets together: Not on file     Attends Taoism service: Not on file     Active member of club or organization: Not on file     Attends meetings of clubs or organizations: Not on file     Relationship status: Not on file    Intimate partner violence     Fear of current or ex partner: Not on file     Emotionally abused: Not on file     Physically abused: Not on file     Forced sexual activity: Not on file   Other Topics Concern    Not on file   Social History Narrative    Not on file     Family History   Problem Relation Age of Onset    Lung Cancer Mother     Diabetes Father     Hypertension Father     Stroke Father     Diabetes Brother     Heart Disease Brother     Hyperthyroidism Daughter      Current Outpatient Medications   Medication Sig Dispense Refill    glipiZIDE (GLUCOTROL) 5 MG tablet Take 2 tablets by mouth 2 times daily 120 tablet 0    valsartan (DIOVAN) 320 MG tablet Take 320 mg by mouth daily       No current facility-administered medications for this visit.       Allergies   Allergen Reactions    Bupropion      Increased anxiety     Doxepin Other (See Comments)     Hang over feeling  fatigue    Venlafaxine      Insomnia and sexual side effects    Wellbutrin [Bupropion Hcl] Other (See Comments)     Increased anxiety     Family Status   Relation Name Status    Mother      Father          Smoker.  Bladder cnacer, noncompliant    Sister  Alive    Brother  Alive    Brother  Alive        ADALI    Sister  Alive    Samia  [facility]    Samia  Alive       Lab Review:    Lab Results   Component Value Date    WBC 8.5 2014    HGB 15.4 2014    HCT 44.5 2014    MCV 84.6 2014     2014     Lab Results   Component Value Date     2020    K 4.3 2020     2020    CO2 22 2020    BUN 12 2020    CREATININE 0.6 2020    GLUCOSE 134 2020    CALCIUM 9.0 2020    PROT 6.9 2020    LABALBU 4.2 2020    BILITOT 0.4 2020    ALKPHOS 56 2020    AST 60 2020     2020    LABGLOM >60 2020    GFRAA >60 2020    AGRATIO 1.6 2020    GLOB 2.7 2020     No results found for: TSHFT4, TSH, FT3  Lab Results   Component Value Date    LABA1C 9.0 2020     Lab Results   Component Value Date    .6 2020     Lab Results   Component Value Date    CHOL 178 2020     Lab Results   Component Value Date    TRIG 209 2020     Lab Results   Component Value Date    HDL 33 2020     Lab Results   Component Value Date    LDLCALC 103 2020     Lab Results   Component Value Date    LABVLDL 42 2020     No results found for: [facility]  Lab Results   Component Value Date    LABMICR <1.20 2020     No results found for: VITD25     Review of Systems:  Constitutional: has fatigue, no fever, no recent weight gain, no recent weight loss, no changes in appetite  Eyes: no eye pain, no change in vision, no eye redness, no eye irritation, no double vision  Ears, nose, throat: no nasal congestion, no sore throat, no earache, no decrease in hearing, no hoarseness, no dry mouth, no sinus problems, no difficulty swallowing, no neck lumps, no dental problems, no mouth sores, no ringing in ears  Pulmonary: has shortness of breath, no wheezing, no dyspnea on exertion, no cough  Cardiovascular: no chest pain, no lower extremity edema, no orthopnea, no intermittent leg claudication, no palpitations  Gastrointestinal: no abdominal pain, no nausea, no vomiting, no diarrhea, no constipation, no dysphagia, no heartburn, no bloating  Genitourinary: no dysuria, no urinary incontinence, no urinary hesitancy, has urinary frequency, no feelings of urinary urgency, has nocturia  Musculoskeletal: no joint swelling, no joint stiffness, no joint pain, no muscle cramps, no muscle pain, no bone pain  Integument/Breast: no skin rashes, no skin lesions, no itching, has dry skin  Neurological: no numbness, no tingling, no weakness, no confusion, no headaches, no dizziness, no fainting, no tremors, no decrease in memory, no balance problems  Psychiatric: no anxiety, no depression, has insomnia  Hematologic/Lymphatic: no tendency for easy bleeding, no swollen lymph nodes, no tendency for easy bruising  Immunology: no seasonal allergies, no frequent infections, no frequent illnesses  Endocrine: no temperature intolerance    /75   Pulse 69   Temp 97.3 °F (36.3 °C)   Resp 14   Ht 6' 4\" (1.93 m)   Wt (!) 338 lb (153.3 kg)   SpO2 95%   BMI 41.14 kg/m²    Wt Readings from Last 3 Encounters:   08/18/20 (!) 338 lb (153.3 kg)   04/17/19 (!) 333 lb (151 kg)   02/14/19 (!) 338 lb (153.3 kg)     Body mass index is 41.14 kg/m².       OBJECTIVE:  Constitutional: no acute distress, well appearing and well nourished  Psychiatric: oriented to person, place and time, judgement and insight and normal, recent and remote memory and intact and mood and affect are normal  Skin: skin and subcutaneous tissue is normal without mass, normal turgor  Head and Face: examination of head and face revealed no abnormalities  Eyes: no lid or conjunctival swelling, erythema or discharge, pupils are normal, equal, round, reactive to light  Ears/Nose: external inspection of ears and nose revealed no abnormalities, hearing is grossly normal  Oropharynx/Mouth/Face: lips, tongue and gums are normal with no lesions, the voice quality was normal  Neck: neck is supple and symmetric, with midline trachea and no masses, thyroid is normal  Lymphatics: normal cervical lymph nodes, normal supraclavicular nodes  Pulmonary: no increased work of breathing or signs of respiratory distress, lungs are clear to auscultation  Cardiovascular: normal heart rate and rhythm, normal S1 and S2, no murmurs and pedal pulses and 2+ bilaterally, 1+ edema  Abdomen: abdomen is soft, non-tender with no masses  Musculoskeletal: normal gait and station and exam of the digits and nails are normal  Neurological: normal coordination and normal general cortical function    Visual inspection:  Deformity/amputation: absent  Skin lesions/pre-ulcerative calluses: absent  Edema: right- 1+, left- 1+    Sensory exam:  Monofilament sensation: normal  (minimum of 5 random plantar locations tested, avoiding callused areas - > 1 area with absence of sensation is + for neuropathy)    Plus at least one of the following:  Pulses: normal  Proprioception: Intact  Vibration (128 Hz): Intact    ASSESSMENT/PLAN:  1. Diabetes mellitus type 2, uncontrolled, without complications (HCC)  Patient cannot tolerate metformin because of diarrhea. Patient refuses insulin at this time. Glipizide 5 mg 2 tablets twice a day  Revisit in 2 weeks  - Microalbumin / Creatinine Urine Ratio; Future  - Comprehensive Metabolic Panel; Future  - C-Peptide; Future  -  DIABETES FOOT EXAM  - Hemoglobin A1C; Future  - Lipid Panel; Future  - [facility] Individual Diabetes Education (Non Care Coord Patient), [facility]  - glipiZIDE (GLUCOTROL) 5 MG tablet;  Take 2 tablets by mouth 2 times daily  Dispense: 120 tablet; Refill: 0    2. Mixed hyperlipidemia    Target LDL below 100  Repeat lipid panel  Will need statin  - Lipid Panel; Future    3. Class 3 severe obesity with serious comorbidity and body mass index (BMI) of 40.0 to 44.9 in adult, unspecified obesity type (HCC)  Diet, exercise    4. Essential hypertension  Continue valsartan      Reviewed and/or ordered clinical lab results Yes  Reviewed and/or ordered radiology tests No  Reviewed and/or ordered other diagnostic tests No  Discussed test results with performing physician No  Independently reviewed image, tracing, or specimen No  Made a decision to obtain old records No  Reviewed and summarized old records Yes   Hemoglobin A1c 9.2    Obtained history from other than patient No    Angeles Núñez was counseled regarding symptoms of diabetes diagnosis, course and complications of disease if inadequately treated, side effects of medications, diagnosis, treatment options, and prognosis, risks, benefits, complications, and alternatives of treatment, labs, imaging and other studies and treatment targets and goals, medications for diabetes management, insulin, monitoring, diet, activity, medication side effects. He understands instructions and counseling. These diagnosis were discussed and reviewed with the patient including the advantages of drug therapy. He was counseled at this visit on the following: diabetes complication prevention and foot care. Total visit time 45 minutes, more than 50% was face-to-face counseling time  See assessment, plan and counseling note for details    Return in about 2 weeks (around 9/1/2020) for diabetes.     Electronically signed by Thiago Mckinnon MD on 8/22/2020 at 8:40 PM

## 2020-08-19 LAB
ESTIMATED AVERAGE GLUCOSE: 211.6 MG/DL
HBA1C MFR BLD: 9 %

## 2020-08-20 LAB — C-PEPTIDE: 4.9 NG/ML (ref 1.1–4.4)

## 2020-08-24 ENCOUNTER — CLINICAL DOCUMENTATION (OUTPATIENT)
Dept: PHARMACY | Facility: CLINIC | Age: 50
End: 2020-08-24

## 2020-08-24 NOTE — LETTER
[address]  [address], [address]  Phone: toll free [phone] option 7          Estevan Members   [address] [place] [address]           08/24/20     Dear Ankur Galvez! You have successfully enrolled in the Be Well With Diabetes program for 2020. What you receive  Beginning October 1, you will begin receiving up to $300 in waived copays for specific medications and pharmacy-related supplies purchased through our home delivery pharmacy, [facility]. A list of eligible medications and pharmacy supplies can be found at [facility] under Be Well With Diabetes. In addition, youll receive advice and help from our pharmacists, associate care management team and diabetes educators. (And, if you also participate in the Be Well program, you can earn points and Lifestyle Management or Health Management program credit, if applicable.)      What you need to do  To maintain your benefit this year and remain eligible next year, complete the following requirements:        *Can be satisfied through [facility] Screening on-site. **Requirements to enroll must be completed within 6 months of enrollment date **Pneumonia vaccine is dependent on previous immunization and your age. Remember, program requirements must be completed by deadlines shown. If not, your benefit may be terminated, and you will not be eligible to participate again until the following year. To keep you on track, well review your [employer] account and send reminders for action. (If you dont have a [address], submit documentation to [email or web address] or by fax to [phone].)    Congratulations and thank you for taking steps to improve your health and to Be Well With Diabetes. [address] [facility]  Phone: [phone], option 7  Email: [email or web address]  Fax: [phone]

## 2020-09-02 PROBLEM — R74.8 ELEVATED LIVER ENZYMES: Status: ACTIVE | Noted: 2020-09-02

## 2020-09-03 ENCOUNTER — OFFICE VISIT (OUTPATIENT)
Dept: ENDOCRINOLOGY | Age: 50
End: 2020-09-03
Payer: COMMERCIAL

## 2020-09-03 VITALS
SYSTOLIC BLOOD PRESSURE: 127 MMHG | HEIGHT: 76 IN | TEMPERATURE: 97.3 F | BODY MASS INDEX: 38.36 KG/M2 | OXYGEN SATURATION: 96 % | RESPIRATION RATE: 14 BRPM | HEART RATE: 57 BPM | WEIGHT: 315 LBS | DIASTOLIC BLOOD PRESSURE: 69 MMHG

## 2020-09-03 PROCEDURE — 3017F COLORECTAL CA SCREEN DOC REV: CPT | Performed by: INTERNAL MEDICINE

## 2020-09-03 PROCEDURE — 2022F DILAT RTA XM EVC RTNOPTHY: CPT | Performed by: INTERNAL MEDICINE

## 2020-09-03 PROCEDURE — G8427 DOCREV CUR MEDS BY ELIG CLIN: HCPCS | Performed by: INTERNAL MEDICINE

## 2020-09-03 PROCEDURE — 3052F HG A1C>EQUAL 8.0%<EQUAL 9.0%: CPT | Performed by: INTERNAL MEDICINE

## 2020-09-03 PROCEDURE — 99214 OFFICE O/P EST MOD 30 MIN: CPT | Performed by: INTERNAL MEDICINE

## 2020-09-03 PROCEDURE — 1036F TOBACCO NON-USER: CPT | Performed by: INTERNAL MEDICINE

## 2020-09-03 PROCEDURE — G8417 CALC BMI ABV UP PARAM F/U: HCPCS | Performed by: INTERNAL MEDICINE

## 2020-09-03 RX ORDER — LANCETS 30 GAUGE
1 EACH MISCELLANEOUS 4 TIMES DAILY
Qty: 400 EACH | Refills: 1 | Status: SHIPPED | OUTPATIENT
Start: 2020-09-03 | End: 2021-01-19

## 2020-09-03 RX ORDER — GLIPIZIDE 5 MG/1
10 TABLET ORAL 2 TIMES DAILY
Qty: 360 TABLET | Refills: 1 | Status: SHIPPED | OUTPATIENT
Start: 2020-09-03 | End: 2020-09-14

## 2020-09-03 NOTE — PROGRESS NOTES
Zac Davis is a 45 y.o. male who is being evaluated for Type 2 diabetes mellitus. Current symptoms/problems include hyperglycemia and are worsening. Diabetes mellitus type 2, uncontrolled, without complications ([facility] [A17.26]    Diagnosed with Type 2 diabetes mellitus in 2020. Comorbid conditions: hyperlipidemia, HTN    Current diabetic medications include: Glipizide    Intolerance to diabetes medications: Yes Metformin severe diarrhea     Weight trend: stable  Prior visit with dietician: yes  Current diet: on average, 2 meals per day  Current exercise: walking     Current monitoring regimen: home blood tests - 1 times daily  Has brought blood glucose log/meter:  No  Home blood sugar records: fasting range: 120-130 and postprandial range: 120-130-140  Any episodes of hypoglycemia? No  Hypoglycemia frequency and time(s):    Does patient have Glucagon emergency kit? No  Does patient have rapid acting carbohydrate? No  Does patient wear a medic alert bracelet or necklace? No    2. Mixed hyperlipidemia  No muscle pain    3. Class 3 severe obesity with serious comorbidity and body mass index (BMI) of 40.0 to 44.9 in adult, unspecified obesity type ([address]  Started eating healthier    4. Essential hypertension  Has headaches    5. Elevated liver enzymes  No nausea, vomiting, jaundice.       Past Medical History:   Diagnosis Date    Cholesterol serum elevated     Depression     Diabetes mellitus ([address]     Hypertension     Reflux       Patient Active Problem List   Diagnosis    Diabetes mellitus type 2, uncontrolled, without complications (HCC)    Mixed hyperlipidemia    Class 3 severe obesity with body mass index (BMI) of 40.0 to 44.9 in adult [facility])    Essential hypertension    Elevated liver enzymes     Past Surgical History:   Procedure Laterality Date    ANTERIOR CRUCIATE LIGAMENT REPAIR      CARPAL TUNNEL RELEASE  11/15/10    right    CARPAL TUNNEL RELEASE  12/6/10    KNEE ARTHROSCOPY      TONSILLECTOMY      UMBILICAL HERNIA REPAIR N/A 12/22/14    LAPAROSCOPIC WITH MESH     Social History     Socioeconomic History    Marital status:      Spouse name: Not on file    Number of children: Not on file    Years of education: Not on file    Highest education level: Not on file   Occupational History    Not on file   Social Needs    Financial resource strain: Not on file    Food insecurity     Worry: Not on file     Inability: Not on file    Transportation needs     Medical: Not on file     Non-medical: Not on file   Tobacco Use    Smoking status: Never Smoker    Smokeless tobacco: Never Used   Substance and Sexual Activity    Alcohol use: Yes     Comment: Very occasional    Drug use: No    Sexual activity: Not on file   Lifestyle    Physical activity     Days per week: Not on file     Minutes per session: Not on file    Stress: Not on file   Relationships    Social connections     Talks on phone: Not on file     Gets together: Not on file     Attends Sabianism service: Not on file     Active member of club or organization: Not on file     Attends meetings of clubs or organizations: Not on file     Relationship status: Not on file    Intimate partner violence     Fear of current or ex partner: Not on file     Emotionally abused: Not on file     Physically abused: Not on file     Forced sexual activity: Not on file   Other Topics Concern    Not on file   Social History Narrative    Not on file     Family History   Problem Relation Age of Onset    Lung Cancer Mother     Diabetes Father     Hypertension Father     Stroke Father     Diabetes Brother     Heart Disease Brother     Hyperthyroidism Daughter      Current Outpatient Medications   Medication Sig Dispense Refill    glipiZIDE (GLUCOTROL) 5 MG tablet Take 2 tablets by mouth 2 times daily 360 tablet 1    valsartan (DIOVAN) 320 MG tablet Take 320 mg by mouth daily       No current facility-administered medications for this visit. Allergies   Allergen Reactions    Bupropion      Increased anxiety     Doxepin Other (See Comments)     Hang over feeling  fatigue    No Known Allergies     Venlafaxine      Insomnia and sexual side effects    Wellbutrin [Bupropion Hcl] Other (See Comments)     Increased anxiety     Family Status   Relation Name Status    Mother      Father          Smoker.  Bladder cnacer, noncompliant    Sister  Alive    Brother  Alive    Brother  Alive        ADALI    Sister  Alive    [facility]  [facility]    Samia  Alive       Lab Review:    Lab Results   Component Value Date    WBC 8.5 2014    HGB 15.4 2014    HCT 44.5 2014    MCV 84.6 2014     2014     Lab Results   Component Value Date     2020    K 4.3 2020     2020    CO2 22 2020    BUN 12 2020    CREATININE 0.6 2020    GLUCOSE 134 2020    CALCIUM 9.0 2020    PROT 6.9 2020    LABALBU 4.2 2020    BILITOT 0.4 2020    ALKPHOS 56 2020    AST 60 2020     2020    LABGLOM >60 2020    GFRAA >60 2020    AGRATIO 1.6 2020    GLOB 2.7 2020     No results found for: TSHFT4, TSH, FT3  Lab Results   Component Value Date    LABA1C 9.0 2020     Lab Results   Component Value Date    .6 2020     Lab Results   Component Value Date    CHOL 178 2020     Lab Results   Component Value Date    TRIG 209 2020     Lab Results   Component Value Date    HDL 33 2020     Lab Results   Component Value Date    LDLCALC 103 2020     Lab Results   Component Value Date    LABVLDL 42 2020     No results found for: [facility]  Lab Results   Component Value Date    LABMICR <1.20 2020     No results found for: VITD25     Review of Systems:  Constitutional: has fatigue, no fever, no recent weight gain, no recent weight loss, no changes in appetite  Eyes: no eye pain, no change in vision, no eye redness, no eye irritation, no double vision  Ears, nose, throat: no nasal congestion, no sore throat, no earache, no decrease in hearing, no hoarseness, no dry mouth, no sinus problems, no difficulty swallowing, no neck lumps, no dental problems, no mouth sores, no ringing in ears  Pulmonary: has shortness of breath, no wheezing, no dyspnea on exertion, no cough  Cardiovascular: no chest pain, no lower extremity edema, no orthopnea, no intermittent leg claudication, no palpitations  Gastrointestinal: no abdominal pain, no nausea, no vomiting, no diarrhea, no constipation, no dysphagia, no heartburn, no bloating  Genitourinary: no dysuria, no urinary incontinence, no urinary hesitancy, has urinary frequency, no feelings of urinary urgency, has nocturia  Musculoskeletal: no joint swelling, no joint stiffness, no joint pain, no muscle cramps, no muscle pain, no bone pain  Integument/Breast: no skin rashes, no skin lesions, no itching, has dry skin  Neurological: no numbness, no tingling, no weakness, no confusion, no headaches, no dizziness, no fainting, no tremors, no decrease in memory, no balance problems  Psychiatric: no anxiety, no depression, has insomnia  Hematologic/Lymphatic: no tendency for easy bleeding, no swollen lymph nodes, no tendency for easy bruising  Immunology: no seasonal allergies, no frequent infections, no frequent illnesses  Endocrine: no temperature intolerance    /69   Pulse 57   Temp 97.3 °F (36.3 °C)   Resp 14   Ht 6' 4\" (1.93 m)   Wt (!) 334 lb (151.5 kg)   SpO2 96%   BMI 40.66 kg/m²    Wt Readings from Last 3 Encounters:   09/03/20 (!) 334 lb (151.5 kg)   08/18/20 (!) 338 lb (153.3 kg)   04/17/19 (!) 333 lb (151 kg)     Body mass index is 40.66 kg/m².       OBJECTIVE:  Constitutional: no acute distress, well appearing and well nourished  Psychiatric: oriented to person, place and time, judgement and insight and normal, recent and remote memory and intact and mood and affect are normal  Skin: skin and subcutaneous tissue is normal without mass, normal turgor  Head and Face: examination of head and face revealed no abnormalities  Eyes: no lid or conjunctival swelling, erythema or discharge, pupils are normal, equal, round, reactive to light  Ears/Nose: external inspection of ears and nose revealed no abnormalities, hearing is grossly normal  Oropharynx/Mouth/Face: lips, tongue and gums are normal with no lesions, the voice quality was normal  Neck: neck is supple and symmetric, with midline trachea and no masses, thyroid is normal  Lymphatics: normal cervical lymph nodes, normal supraclavicular nodes  Pulmonary: no increased work of breathing or signs of respiratory distress, lungs are clear to auscultation  Cardiovascular: normal heart rate and rhythm, normal S1 and S2, no murmurs and pedal pulses and 2+ bilaterally, 1+ edema  Abdomen: abdomen is soft, non-tender with no masses  Musculoskeletal: normal gait and station and exam of the digits and nails are normal  Neurological: normal coordination and normal general cortical function    Visual inspection:  Deformity/amputation: absent  Skin lesions/pre-ulcerative calluses: absent  Edema: right- 1+, left- 1+    Sensory exam:  Monofilament sensation: normal  (minimum of 5 random plantar locations tested, avoiding callused areas - > 1 area with absence of sensation is + for neuropathy)    Plus at least one of the following:  Pulses: normal  Proprioception: Intact  Vibration (128 Hz): Intact    ASSESSMENT/PLAN:  1. Diabetes mellitus type 2, uncontrolled, without complications ([address]  Consider Ozempic next sai  BG improved to 120-140, continue monitoring  Hemoglobin A1c 9.0  Glucose 134  C-peptide 4.9  Glipizide 5 mg 2 tablets twice a day  - Microalbumin / Creatinine Urine Ratio; Future  - Comprehensive Metabolic Panel; Future  - Hemoglobin A1C; Future  - Lipid Panel; Future    2.  Mixed hyperlipidemia  LDL

## 2020-09-04 RX ORDER — BLOOD-GLUCOSE CONTROL, LOW
EACH MISCELLANEOUS
Qty: 1 EACH | Refills: 0 | Status: SHIPPED | OUTPATIENT
Start: 2020-09-04 | End: 2021-01-19

## 2020-09-04 RX ORDER — BLOOD-GLUCOSE METER
EACH MISCELLANEOUS
Qty: 1 KIT | Refills: 0 | Status: SHIPPED | OUTPATIENT
Start: 2020-09-04

## 2020-09-04 RX ORDER — BLOOD SUGAR DIAGNOSTIC
STRIP MISCELLANEOUS
Qty: 400 EACH | Refills: 3 | Status: SHIPPED | OUTPATIENT
Start: 2020-09-04

## 2020-09-04 RX ORDER — BLOOD-GLUCOSE CONTROL, LOW
EACH MISCELLANEOUS
Qty: 400 EACH | Refills: 3 | Status: SHIPPED | OUTPATIENT
Start: 2020-09-04

## 2020-09-04 NOTE — TELEPHONE ENCOUNTER
Dr. Renay Azar - patient will be enrolled in [facility] DM program effective 10/1/20. He would like set up with free testing supplies through insurance - [employer] glucometer/supplies, testing up to 4 times daily. Orders pending for your convenience if OK with you? Thank you!   Ciarra Jiang, PharmD,  [facility] Pharmacist  Dept: [phone] (toll free [phone], option 7)

## 2020-09-04 NOTE — TELEPHONE ENCOUNTER
Incoming call from Harsha (also in DM program) regarding her newly enrolled/diagnosed . Has lancets ordered and coming from [facility] but would like to get BASIC Prodigy meter, test strips, control soln(?) and his own lancing device. She is aware the program benefit will begin Oct. 1 but would like to get his own set up now and is willing to spend up to $30 in order to get him started. Tests QID and states Dr. Arturo Duque would auth any necessary scripts. Will route to Pharm D.to facilitate.

## 2020-09-08 NOTE — TELEPHONE ENCOUNTER
Noted Rxs signed by provider - thank you! Per [facility] pharmacy claims Prodigy supplies filled by P.     For Pharmacy Admin Tracking Only    PHSO: Yes  Total # of Interventions Recommended: 1  - New Order #: 1 New Medication Order Reason(s): Needs Additional Medication Therapy  Recommended intervention potential cost savings: 1  Total Interventions Accepted: 1  Time Spent (min): Janusz 22, PharmD  [address]

## 2020-09-14 RX ORDER — GLIPIZIDE 5 MG/1
TABLET ORAL
Qty: 120 TABLET | Refills: 2 | Status: SHIPPED
Start: 2020-09-14 | End: 2020-12-11 | Stop reason: SDUPTHER

## 2020-09-24 ENCOUNTER — TELEPHONE (OUTPATIENT)
Dept: PHARMACY | Facility: CLINIC | Age: 50
End: 2020-09-24

## 2020-09-24 NOTE — TELEPHONE ENCOUNTER
2020 Annual Pharmacist Visit    Called patient to schedule 2020 yearly pharmacist appointment to discuss medications for Diabetes Management Program.    Spoke to patient and appointment scheduled for 9/28/20 at [place] [address], [address] [facility]   Department, toll free: [phone], option 7

## 2020-09-28 ENCOUNTER — SCHEDULED TELEPHONE ENCOUNTER (OUTPATIENT)
Dept: PHARMACY | Facility: CLINIC | Age: 50
End: 2020-09-28

## 2020-09-28 NOTE — TELEPHONE ENCOUNTER
[facility] Employee Diabetes Program  =================================================================  Rosy Encinas is a 45 y.o. male enrolled in the [facility] AT [facility] Employee Diabetes Program. Patient is a 10/1/20 enrollee      Medications:  Current Outpatient Medications   Medication Sig Dispense Refill    glipiZIDE (GLUCOTROL) 5 MG tablet TAKE TWO TABLETS BY MOUTH TWICE A  tablet 2    Blood Glucose Monitoring Suppl (PRODIGY AUTOCODE BLOOD GLUCOSE) w/Device KIT Use as directed. 1 kit 0    Prodigy Lancets 28G MISC Use to test blood sugar up to 4 times daily 400 each 3    blood glucose test strips (PRODIGY NO CODING BLOOD GLUC) strip Use to test blood sugar up to 4 times daily 400 each 3    Lancet Devices (PRODIGY LANCING DEVICE) MISC Use as directed to test blood sugars 1 each 0    Lancets MISC 1 each by Does not apply route 4 times daily accu-check dilcia lancets 400 each 1    valsartan (DIOVAN) 320 MG tablet Take 320 mg by mouth daily       No current facility-administered medications for this visit. Current Pharmacy: [facility]  Current testing supplies/frequency: Prodigy- pt reports testing BID. Once fasting, and another 2 hrs post prandial.  Pen needles/syringes: n/a    Allergies:   Allergies   Allergen Reactions    Bupropion      Increased anxiety     Doxepin Other (See Comments)     Hang over feeling  fatigue    No Known Allergies     Venlafaxine      Insomnia and sexual side effects    Wellbutrin [Bupropion Hcl] Other (See Comments)     Increased anxiety      Vitals/Labs:  BP Readings from Last 3 Encounters:   09/03/20 127/69   08/18/20 130/75   04/17/19 139/89     Lab Results   Component Value Date    LABMICR <1.20 08/18/2020     Lab Results   Component Value Date    LABA1C 9.0 08/18/2020    LABA1C 9.2 07/29/2020     Lab Results   Component Value Date    CHOL 178 08/18/2020    TRIG 209 (H) 08/18/2020    HDL 33 (L) 08/18/2020    LDLCALC 103 (H) 08/18/2020 LDLDIRECT 121 (H) 07/29/2020     ALT   Date Value Ref Range Status   08/18/2020 105 (H) 10 - 40 U/L Final     AST   Date Value Ref Range Status   08/18/2020 60 (H) 15 - 37 U/L Final     The 10-year ASCVD risk score (Dwight Vargas, et al., 2013) is: 10.8%    Values used to calculate the score:      Age: 45 years      Sex: Male      Is Non- : No      Diabetic: Yes      Tobacco smoker: No      Systolic Blood Pressure: 751 mmHg      Is BP treated: Yes      HDL Cholesterol: 33 mg/dL      Total Cholesterol: 178 mg/dL     Lab Results   Component Value Date    CREATININE 0.6 (L) 08/18/2020       Immunizations:  Immunization History   Administered Date(s) Administered    Influenza, Quadv, IM, (6 mo and older Fluzone, Flulaval, Fluarix and 3 yrs and older Afluria) 09/15/2010, 09/28/2011    Tdap (Boostrix, Adacel) 02/26/2016      Social History:  Social History     Tobacco Use    Smoking status: Never Smoker    Smokeless tobacco: Never Used   Substance Use Topics    Alcohol use: Yes     Comment: Very occasional     ASSESSMENT:  Initial Program Requirements (Y indicates has completed for the year, N indicates needs to be completed by 10/1 enrollee):  Yes - OV with provider for DM (1st)  Yes - A1c (1st)     Ongoing Program Requirements (Y indicates has completed for the year, N indicates needs to be completed by 12/31/20): Yes - OV with provider for DM (2nd)  No - ACC/diabetes educator visit (if A1c over 8%)  Yes - A1c (2nd)  Yes - Lipid panel  Yes - Urine microalbumin  Yes - Pneumococcal vaccination: [id number] given 8/2020  No - Influenza vaccination for Fall 2020- Pt states he will get at [facility] or immunizing pharmacy  Yes - Medication adherence over 70%  Yes - On statin or contraindication(s) Not identified- Pt has recently started seeing an endocrinologist.  They have noted that they will likely need to prescribe a statin, but deferred at his last OV.  Will override requirement for 2020 and revisit. Yes - On ACEi/ARB or contraindication(s) Valsartan       Current medications eligible for copay waiver, up to $300, through [address]:  - Valsartan, Glipizide  - Prodigy meter and supplies     Diabetes Care:   - Glycemic Goal: <7.0%. Is not at blood glucose goal which may be related to changes in diet. Recommend extra attention to diet. and which may be related to changes in physical activity. Recommend resuming physical activity. - Patient tried and failed Metformin therapy due to GI AE's  - Therapy Optimization: Patient will likely need further medication therapy to reach goal A1c. Non injecable options that would be beneficial would be SGLTi or DPP4 inhibitors  - He stated that he was currently working to improve diet and lose weight. Reported that he has lost about 7lbs in 3 weeks. Applauded his effort and encouraged to keep it up. Other Considerations:  - Blood Pressure Goal: BP less than 140/90 mmHg due to history of DM: Is at blood pressure goal.   - Lipids: Patient has a 10-yr ASCVD risk of >7.5% with DM and is therefore a candidate for moderate-intensity statin therapy based on updated guidelines. - Smoking status: never smoked    PLAN:  - Consideration(s) for provider:   · Endocrinologist to discuss statin at future appointment. · Could consider SGLT2 or DPP4 if little/no improvement in A1c.  - DM program gaps identified:   · Initial requirements: Requirements met - override statin  · Ongoing requirements: Influenza vaccination for [id number]   - Education to patient: Discussed general issues about diabetes pathophysiology and management. Suggested low cholesterol diet.    - Follow up: PCP for identified gaps or as scheduled below and Pharmacist for identified gaps or as scheduled below  - Upcoming appointments:   Future Appointments   Date Time Provider Moe Adair   9/28/2020 11:00 AM SCHEDULE, S [facility] None   12/11/2020  7:10 AM Anayeli Casper Quincy Torres MD [facility]   2/22/2021  8:30 AM Javi Pace MD AND [facility]     W. Bernard Humphrey, PharmD  [address]  Phone: [phone] option-7      For Pharmacy Admin Tracking Only    PHSO: Yes  Total # of Interventions Recommended: 1  - Updated Order #: 1 Updated Order Reason(s):  Other  Recommended intervention potential cost savings: 1  Total Interventions Accepted: 1  Time Spent (min): 30

## 2020-10-01 ENCOUNTER — CLINICAL DOCUMENTATION (OUTPATIENT)
Dept: PHARMACY | Facility: CLINIC | Age: 50
End: 2020-10-01

## 2020-10-01 NOTE — PROGRESS NOTES
Pharmacy Pop Care Documentation:      The application for Kvng Nieves for enrollment into the diabetes management program has been reviewed and accepted on 10/1/20.     Josh Ontiveros

## 2020-11-17 ENCOUNTER — TELEPHONE (OUTPATIENT)
Dept: PHARMACY | Facility: CLINIC | Age: 50
End: 2020-11-17

## 2020-11-17 NOTE — TELEPHONE ENCOUNTER
Pharmacy Pop Care Documentation:   Called patient with reminder for requirements for Diabetes Management Program.     According to our records, patient is missing the following requirement(s) that must be completed by December 31, 2020:     Yearly flu shot (for 4280-0539 season)     Spoke to patient at home/cell number and advised them of the above information. Patient verified understanding. Per patient he has not had the Flu Vaccination yet but plans to get it as soon as possible.      [place], [address] [employer]   Department, toll free: [phone], option 7

## 2020-12-07 ENCOUNTER — HOSPITAL ENCOUNTER (OUTPATIENT)
Age: 50
Discharge: HOME | End: 2020-12-07
Payer: COMMERCIAL

## 2020-12-07 LAB
A/G RATIO: 1.4 (ref 1.1–2.2)
ALBUMIN SERPL-MCNC: 4.2 G/DL (ref 3.4–5)
ALP BLD-CCNC: 53 U/L (ref 40–129)
ALT SERPL-CCNC: 43 U/L (ref 10–40)
ANION GAP SERPL CALCULATED.3IONS-SCNC: 8 MMOL/L (ref 3–16)
AST SERPL-CCNC: 24 U/L (ref 15–37)
BILIRUB SERPL-MCNC: 0.3 MG/DL (ref 0–1)
BUN BLDV-MCNC: 17 MG/DL (ref 7–20)
CALCIUM SERPL-MCNC: 9.5 MG/DL (ref 8.3–10.6)
CHLORIDE BLD-SCNC: 105 MMOL/L (ref 99–110)
CHOLESTEROL, TOTAL: 189 MG/DL (ref 0–199)
CO2: 27 MMOL/L (ref 21–32)
CREAT SERPL-MCNC: 0.7 MG/DL (ref 0.9–1.3)
GFR AFRICAN AMERICAN: >60
GFR NON-AFRICAN AMERICAN: >60
GLOBULIN: 3.1 G/DL
GLUCOSE BLD-MCNC: 144 MG/DL (ref 70–99)
HDLC SERPL-MCNC: 33 MG/DL (ref 40–60)
LDL CHOLESTEROL CALCULATED: 125 MG/DL
POTASSIUM SERPL-SCNC: 4.4 MMOL/L (ref 3.5–5.1)
SODIUM BLD-SCNC: 140 MMOL/L (ref 136–145)
TOTAL PROTEIN: 7.3 G/DL (ref 6.4–8.2)
TRIGL SERPL-MCNC: 156 MG/DL (ref 0–150)
VLDLC SERPL CALC-MCNC: 31 MG/DL

## 2020-12-07 PROCEDURE — 36415 COLL VENOUS BLD VENIPUNCTURE: CPT

## 2020-12-07 PROCEDURE — 83036 HEMOGLOBIN GLYCOSYLATED A1C: CPT

## 2020-12-07 PROCEDURE — 80053 COMPREHEN METABOLIC PANEL: CPT

## 2020-12-07 PROCEDURE — 80061 LIPID PANEL: CPT

## 2020-12-08 LAB
ESTIMATED AVERAGE GLUCOSE: 139.9 MG/DL
HBA1C MFR BLD: 6.5 %

## 2020-12-10 NOTE — TELEPHONE ENCOUNTER
[id number] Flu Vaccination documentation received. Patient is up to date with the requirements for the DM Program and will be re-enrolled into the program in 2021.     Yesenia Taylor, [address] [employer] [facility]   Department, toll free: [phone], option 7

## 2020-12-11 ENCOUNTER — VIRTUAL VISIT (OUTPATIENT)
Dept: ENDOCRINOLOGY | Age: 50
End: 2020-12-11
Payer: COMMERCIAL

## 2020-12-11 PROBLEM — E11.9 DIABETES MELLITUS TYPE 2, CONTROLLED, WITHOUT COMPLICATIONS (HCC): Status: ACTIVE | Noted: 2020-12-11

## 2020-12-11 PROBLEM — E11.65 CONTROLLED TYPE 2 DIABETES MELLITUS WITH HYPERGLYCEMIA (HCC): Status: ACTIVE | Noted: 2020-12-11

## 2020-12-11 PROCEDURE — 99214 OFFICE O/P EST MOD 30 MIN: CPT | Performed by: INTERNAL MEDICINE

## 2020-12-11 RX ORDER — GLIPIZIDE 5 MG/1
5 TABLET ORAL 2 TIMES DAILY
Qty: 360 TABLET | Refills: 1 | Status: SHIPPED | OUTPATIENT
Start: 2020-12-11 | End: 2020-12-14

## 2020-12-11 NOTE — PROGRESS NOTES
Minh Hameed is a 45 y.o. male who is being evaluated for Type 2 diabetes mellitus. 2020    TELEHEALTH EVALUATION -- Audio/Visual (During KNP-26 public health emergency)    Patient provided verbal consent to use the video visit. HPI:    Minh Hameed (:  1970) has requested an audio/video evaluation for the following concern(s):      Current symptoms/problems include hyperglycemia and are worsening. Controlled type 2 diabetes mellitus without complication, without long-term current use of insulin ([address] [E11.9]    Diagnosed with Type 2 diabetes mellitus in 2020. Comorbid conditions: hyperlipidemia, HTN    Current diabetic medications include: Glipizide    Intolerance to diabetes medications: Yes Metformin severe diarrhea     Weight trend: stable  Prior visit with dietician: yes  Current diet: on average, 2 meals per day  Current exercise: walking     Current monitoring regimen: home blood tests - 1 times daily  Has brought blood glucose log/meter:  No  Home blood sugar records: fasting range: 120-130 and postprandial range: 120-130-140  Any episodes of hypoglycemia? No  Hypoglycemia frequency and time(s):    Does patient have Glucagon emergency kit? No  Does patient have rapid acting carbohydrate? No  Does patient wear a medic alert bracelet or necklace? No    2. Mixed hyperlipidemia  No muscle pain    3. Class 3 severe obesity with serious comorbidity and body mass index (BMI) of 40.0 to 44.9 in adult, unspecified obesity type ([address]  Started eating healthier    4. Essential hypertension  Has headaches    5. Elevated liver enzymes  No nausea, vomiting, jaundice.       Past Medical History:   Diagnosis Date    Cholesterol serum elevated     Depression     Diabetes mellitus ([address]     Hypertension     Reflux       Patient Active Problem List   Diagnosis    Mixed hyperlipidemia    Class 3 severe obesity with body mass index (BMI) of 40.0 to 44.9 in adult [facility])    Essential hypertension  Elevated liver enzymes    Diabetes mellitus type 2, controlled, without complications (HCC)     Past Surgical History:   Procedure Laterality Date    ANTERIOR CRUCIATE LIGAMENT REPAIR      CARPAL TUNNEL RELEASE  11/15/10    right    CARPAL TUNNEL RELEASE  12/6/10    KNEE ARTHROSCOPY      TONSILLECTOMY      UMBILICAL HERNIA REPAIR N/A 12/22/14    LAPAROSCOPIC WITH MESH     Social History     Socioeconomic History    Marital status:      Spouse name: Not on file    Number of children: Not on file    Years of education: Not on file    Highest education level: Not on file   Occupational History    Not on file   Social Needs    Financial resource strain: Not on file    Food insecurity     Worry: Not on file     Inability: Not on file   [employer] needs     Medical: Not on file     Non-medical: Not on file   Tobacco Use    Smoking status: Never Smoker    Smokeless tobacco: Never Used   Substance and Sexual Activity    Alcohol use: Yes     Comment: Very occasional    Drug use: No    Sexual activity: Not on file   Lifestyle    Physical activity     Days per week: Not on file     Minutes per session: Not on file    Stress: Not on file   Relationships    Social connections     Talks on phone: Not on file     Gets together: Not on file     Attends Yarsanism service: Not on file     Active member of club or organization: Not on file     Attends meetings of clubs or organizations: Not on file     Relationship status: Not on file    Intimate partner violence     Fear of current or ex partner: Not on file     Emotionally abused: Not on file     Physically abused: Not on file     Forced sexual activity: Not on file   Other Topics Concern    Not on file   Social History Narrative    Not on file     Family History   Problem Relation Age of Onset    Lung Cancer Mother     Diabetes Father     Hypertension Father     Stroke Father     Diabetes Brother     Heart Disease Brother     Hyperthyroidism Daughter      Current Outpatient Medications   Medication Sig Dispense Refill    glipiZIDE (GLUCOTROL) 5 MG tablet TAKE TWO TABLETS BY MOUTH TWICE A  tablet 2    Blood Glucose Monitoring Suppl (PRODIGY AUTOCODE BLOOD GLUCOSE) w/Device KIT Use as directed. 1 kit 0    Prodigy Lancets 28G MISC Use to test blood sugar up to 4 times daily 400 each 3    blood glucose test strips (PRODIGY NO CODING BLOOD GLUC) strip Use to test blood sugar up to 4 times daily 400 each 3    Lancet Devices (PRODIGY LANCING DEVICE) MISC Use as directed to test blood sugars 1 each 0    Lancets MISC 1 each by Does not apply route 4 times daily accu-check dilcia lancets 400 each 1    valsartan (DIOVAN) 320 MG tablet Take 320 mg by mouth daily       No current facility-administered medications for this visit. Allergies   Allergen Reactions    Bupropion      Increased anxiety     Doxepin Other (See Comments)     Hang over feeling  fatigue    No Known Allergies     Venlafaxine      Insomnia and sexual side effects    Wellbutrin [Bupropion Hcl] Other (See Comments)     Increased anxiety     Family Status   Relation Name Status    Mother      Father          Smoker.  Bladder cnacer, noncompliant    Sister  Alive    Brother  Alive    Brother  Alive        ADALI    Sister  Alive    Samia  [facility]    Samia  Alive       Lab Review:    Lab Results   Component Value Date    WBC 8.5 2014    HGB 15.4 2014    HCT 44.5 2014    MCV 84.6 2014     2014     Lab Results   Component Value Date     2020    K 4.4 2020     2020    CO2 27 2020    BUN 17 2020    CREATININE 0.7 2020    GLUCOSE 144 2020    CALCIUM 9.5 2020    PROT 7.3 2020    LABALBU 4.2 2020    BILITOT 0.3 2020    ALKPHOS 53 2020    AST 24 2020    ALT 43 2020    LABGLOM >60 2020    GFRAA >60 2020    AGRATIO 1.4 12/07/2020    GLOB 3.1 12/07/2020     No results found for: TSHFT4, TSH, FT3  Lab Results   Component Value Date    LABA1C 6.5 12/07/2020     Lab Results   Component Value Date    .9 12/07/2020     Lab Results   Component Value Date    CHOL 189 12/07/2020     Lab Results   Component Value Date    TRIG 156 12/07/2020     Lab Results   Component Value Date    HDL 33 12/07/2020     Lab Results   Component Value Date    LDLCALC 125 12/07/2020     Lab Results   Component Value Date    LABVLDL 31 12/07/2020     No results found for: [facility]  Lab Results   Component Value Date    LABMICR <1.20 08/18/2020     No results found for: VITD25     Review of Systems:  Constitutional: has fatigue, no fever, no recent weight gain, no recent weight loss, no changes in appetite  Eyes: no eye pain, no change in vision, no eye redness, no eye irritation, no double vision  Ears, nose, throat: no nasal congestion, no sore throat, no earache, no decrease in hearing, no hoarseness, no dry mouth, no sinus problems, no difficulty swallowing, no neck lumps, no dental problems, no mouth sores, no ringing in ears  Pulmonary: has shortness of breath, no wheezing, no dyspnea on exertion, no cough  Cardiovascular: no chest pain, no lower extremity edema, no orthopnea, no intermittent leg claudication, no palpitations  Gastrointestinal: no abdominal pain, no nausea, no vomiting, no diarrhea, no constipation, no dysphagia, no heartburn, no bloating  Genitourinary: no dysuria, no urinary incontinence, no urinary hesitancy, has urinary frequency, no feelings of urinary urgency, has nocturia  Musculoskeletal: no joint swelling, no joint stiffness, no joint pain, no muscle cramps, no muscle pain, no bone pain  Integument/Breast: no skin rashes, no skin lesions, no itching, has dry skin  Neurological: no numbness, no tingling, no weakness, no confusion, no headaches, no dizziness, no fainting, no tremors, no decrease in memory, no balance problems  Psychiatric: no anxiety, no depression, has insomnia  Hematologic/Lymphatic: no tendency for easy bleeding, no swollen lymph nodes, no tendency for easy bruising  Immunology: no seasonal allergies, no frequent infections, no frequent illnesses  Endocrine: no temperature intolerance    There were no vitals taken for this visit. Wt Readings from Last 3 Encounters:   09/03/20 (!) 334 lb (151.5 kg)   08/18/20 (!) 338 lb (153.3 kg)   04/17/19 (!) 333 lb (151 kg)     There is no height or weight on file to calculate BMI. OBJECTIVE:  Constitutional: no apparent distress, well developed and well nourished  Mental status: alert and awake, oriented to person, place and time, able to follow commands  Psychiatric: judgement and insight and normal, recent and remote memory are intact, mood and affect are normal  Skin: skin inspection appears normal, no significant exanthematous lesions or discoloration noted on facial skin  Head and Face: head and face inspection revealed no abnormalities, normocephalic, atraumatic  Eyes: no lid or conjunctival swelling, erythema or discharge, sclera appears normal  Ears/Nose: external inspection of ears and nose revealed no abnormalities, hearing is grossly normal  Oropharynx/Mouth/Face: lips are normal with no lesions, the voice quality was normal  Neck: neck is symmetric, no visualized mass  Pulmonary/chest: respiratory effort normal, no generalized signs of difficulty breathing or signs of respiratory distress  Musculoskeletal: normal station, normal range of motion of neck  Neurological: no facial asymmetry, normal general cortical function    ASSESSMENT/PLAN:  1. Diabetes mellitus type 2, controlled, without complications ([address]  Consider Ozempic next sai  BG improved to 120-140, continue monitoring  Hemoglobin A1c 9.2-9.0-6.5  Glucose 134  C-peptide 4.9  Glipizide 5 mg 2 tablets twice a day  - Microalbumin / Creatinine Urine Ratio;  Future  - Comprehensive Metabolic Panel; Future  - Hemoglobin A1C; Future  - Lipid Panel; Future    2. Mixed hyperlipidemia  -103-125  Target LDL below 100  Repeat lipid panel  Will need statin if not at target  - Lipid Panel; Future    3. Class 3 severe obesity with serious comorbidity and body mass index (BMI) of 40.0 to 44.9 in adult, unspecified obesity type (HCC)  Diet, exercise    4. Essential hypertension  Continue valsartan    5. Elevated liver enzymes  -43  AST 60-24    Reviewed and/or ordered clinical lab results Yes  Reviewed and/or ordered radiology tests No  Reviewed and/or ordered other diagnostic tests No  Discussed test results with performing physician No  Independently reviewed image, tracing, or specimen No  Made a decision to obtain old records No  Reviewed and summarized old records Yes   Hemoglobin A1c 9.2    Obtained history from other than patient No    Kvng Nieves was counseled regarding symptoms of diabetes diagnosis, course and complications of disease if inadequately treated, side effects of medications, diagnosis, treatment options, and prognosis, risks, benefits, complications, and alternatives of treatment, labs, imaging and other studies and treatment targets and goals, medications for diabetes management, insulin, monitoring, diet, activity, medication side effects. He understands instructions and counseling. These diagnosis were discussed and reviewed with the patient including the advantages of drug therapy. He was counseled at this visit on the following: diabetes complication prevention and foot care. Kvng Nieves is a 45 y.o. male being evaluated by a Virtual Visit (video visit) encounter, including two-way audio and video communication, in lieu of an in-person visit due to coronavirus emergency, to address concerns as mentioned in history and assessment and plan. Patient identification was verified at the start of the visit.     I conducted an interview, performed a limited exam by video and educated the patient on my assessment and plan. Due to this being a TeleHealth encounter (During EDUFB-92 public health emergency), evaluation of the following organ systems was limited: Vitals/Constitutional/EENT/Resp/CV/GI//MS/Neuro/Skin/Heme-Lymph-Imm. Pursuant to the emergency declaration under the [facility] [place], [address] [place] [address] and the [employer] and [employer] Act, this Virtual Visit was conducted with patient's (and/or legal guardian's) consent, to reduce the patient's risk of exposure to COVID-19 and provide necessary medical care. The patient (and/or legal guardian) has also been advised to contact this office for worsening conditions or problems, and seek emergency medical treatment and/or call 911 if deemed necessary. Total time spent on this encounter via Telehealth (synchronous, real-time audio/visual connection): 25 min    See assessment, plan and counseling note for counseling and care coordination details. Services were provided through a video synchronous discussion virtually to substitute for in-person clinic visit. Persons participating in the telehealth service: provider - Silvano Carpenter MD and patient Darryle Paterson. Provider was located at her office. Patient was located at home. --Silvano Carpenter MD on 12/11/2020 at 7:25 AM    An electronic signature was used to authenticate this note. Return in about 4 months (around 4/11/2021) for diabetes.     Electronically signed by Silvano Carpenter MD on 12/11/2020 at 7:25 AM

## 2020-12-14 ENCOUNTER — TELEPHONE (OUTPATIENT)
Dept: ENDOCRINOLOGY | Age: 50
End: 2020-12-14

## 2020-12-14 RX ORDER — GLIPIZIDE 5 MG/1
10 TABLET ORAL 2 TIMES DAILY
Qty: 360 TABLET | Refills: 1 | Status: SHIPPED | OUTPATIENT
Start: 2020-12-14

## 2020-12-15 NOTE — TELEPHONE ENCOUNTER
I corrected prescription. It had to be 5 mg 2 tablets twice a day. Please dispense 360 tablets. Please notify pharmacy.

## 2021-01-11 ENCOUNTER — TELEPHONE (OUTPATIENT)
Dept: PULMONOLOGY | Age: 51
End: 2021-01-11

## 2021-01-11 NOTE — TELEPHONE ENCOUNTER

## 2021-01-12 ENCOUNTER — TELEPHONE (OUTPATIENT)
Dept: PHARMACY | Facility: CLINIC | Age: 51
End: 2021-01-12

## 2021-01-19 ENCOUNTER — SCHEDULED TELEPHONE ENCOUNTER (OUTPATIENT)
Dept: PHARMACY | Facility: CLINIC | Age: 51
End: 2021-01-19

## 2021-01-19 NOTE — TELEPHONE ENCOUNTER
Moe [facility] REVIEW - BE WELL WITH DIABETES  =================================================================  Marques Caballero is a 45 y.o. male enrolled in the [address] [place], [place] [address] Employee Diabetes Program. Patient provided Meagan Larsen with verbal consent to remain in the program for this year. Patient enrolled 10/1/20. Medications:  Current Outpatient Medications   Medication Sig Dispense Refill    glipiZIDE (GLUCOTROL) 5 MG tablet Take 2 tablets by mouth 2 times daily 360 tablet 1    Blood Glucose Monitoring Suppl (PRODIGY AUTOCODE BLOOD GLUCOSE) w/Device KIT Use as directed. 1 kit 0    Prodigy Lancets 28G MISC Use to test blood sugar up to 4 times daily 400 each 3    blood glucose test strips (PRODIGY NO CODING BLOOD GLUC) strip Use to test blood sugar up to 4 times daily 400 each 3    Lancet Devices (PRODIGY LANCING DEVICE) MISC    *removed/list clean up   Use as directed to test blood sugars 1 each 0    Lancets MISC    *removed duplicate 1 each by Does not apply route 4 times daily accu-check dilcia lancets 400 each 1    valsartan (DIOVAN) 320 MG tablet Take 320 mg by mouth daily       Current Pharmacy: [facility]    Allergies:   Allergies   Allergen Reactions    Bupropion      Increased anxiety     Doxepin Other (See Comments)     Hang over feeling  fatigue    No Known Allergies     Venlafaxine      Insomnia and sexual side effects    Wellbutrin [Bupropion Hcl] Other (See Comments)     Increased anxiety      Vitals/Labs:  BP Readings from Last 3 Encounters:   09/03/20 127/69   08/18/20 130/75   04/17/19 139/89     Lab Results   Component Value Date    LABMICR <1.20 08/18/2020     Lab Results   Component Value Date    LABA1C 6.5 12/07/2020    LABA1C 9.0 08/18/2020    LABA1C 9.2 07/29/2020     Lab Results   Component Value Date    CHOL 189 12/07/2020    TRIG 156 (H) 12/07/2020    HDL 33 (L) 12/07/2020    LDLCALC 125 (H) 12/07/2020     ALT   Date Value Ref Range Status   12/07/2020 43 (H) 10 - 40 U/L Final     AST   Date Value Ref Range Status   12/07/2020 24 15 - 37 U/L Final     The 10-year ASCVD risk score (Shi Sanders, et al., 2013) is: 11.7%    Values used to calculate the score:      Age: 45 years      Sex: Male      Is Non- : No      Diabetic: Yes      Tobacco smoker: No      Systolic Blood Pressure: 259 mmHg      Is BP treated: Yes      HDL Cholesterol: 33 mg/dL      Total Cholesterol: 189 mg/dL     Lab Results   Component Value Date    CREATININE 0.7 (L) 12/07/2020     Immunizations:  Immunization History   Administered Date(s) Administered    Influenza, Quadv, IM, (6 mo and older Fluzone, Flulaval, Fluarix and 3 yrs and older Afluria) 09/15/2010, 09/28/2011    Influenza, Quadv, IM, PF (6 mo and older Fluzone, Flulaval, Fluarix, and 3 yrs and older Afluria) 12/07/2020    Pneumococcal Polysaccharide ([id number]) 08/10/2020    Tdap (Boostrix, Adacel) 02/26/2016      Social History:  Social History     Tobacco Use    Smoking status: Never Smoker    Smokeless tobacco: Never Used   Substance Use Topics    Alcohol use: Yes     Comment: Very occasional     ASSESSMENT:  Initial Program Requirements (Y indicates has completed for the year, N indicates needs to be completed by 7/1/2021): No - Office Visit with provider for DM (1st)  No - A1c (1st)     Ongoing Program Requirements (Y indicates has completed for the year, N indicates needs to be completed by 12/31/2021):   No - Office Visit with provider for DM (2nd)  No - ACC/diabetes educator visit (if A1c over 8%)  No - A1c (2nd)  No - Lipid panel  No - Urine microalbumin  Yes - Pneumococcal vaccination: up to date  No - Influenza vaccination for Fall 2021  No - Medication adherence over 70%  No - On statin or contraindication(s)   Yes - On ACEi/ARB or contraindication(s) - prescribed valsartan     Formulary Medication Review:  Non-formulary or medications with cost-effective alternatives: n/a. Current medications eligible for copay waiver, up to $600, through [address] [employer] [address] [place]:  - glipizide, valsartan; if started - generic \"statin\"  - Prodigy     Diabetes Care:   - Glycemic Goal: <7.0% and directed by provider. Is at blood glucose goal, taking glipizide 10mg BID (prev severe diarrhea with metformin). - Home blood sugar records:  reports testing regularly; did not review  - Any episodes of hypoglycemia? no  - Eye exam current (within one year): unknown  - Foot exam current (within one year): yes  - Medication compliance: compliant most of the time  - Declined diet/exercise review    Other Considerations:  - Blood Pressure Goal: BP less than 140/90 mmHg due to history of DM: Is at blood pressure goal.   - Lipids: not currently prescribed statin therapy; 50yom with DM, HTN, obesity, , and 10-year ASCVD risk @12% - may benefit from at least moderate-intensity statin. Noted elevated ALT in Dec, which was improved from prev result in Aug (no lab hx prior to those results)  - Adherent to ACEi/ARB and/or statin: unable to assess; [facility]  - Smoking status: Never smoked    PLAN:  - Medication list updated as noted above and outside meds reconciled  - Consideration(s):   · Start statin therapy, such as atorvastatin 10mg daily, with LFT monitoring  · Patient prefers to avoid additional medication/s - states was discussed at Dec visit and determined to hold off for now (per 12/11 endo visit note:  Will need statin if not at target)  · Discussed re-reviewing again this year with provider  - DM program gaps identified:   · Initial requirements: Office Visit with provider for DM (1st) and A1c (1st)   · Ongoing requirements: Office visit with provider for DM (2nd), ACC/diabetes educator visit (if A1c over 8%), A1c (2nd), taking statin or contraindication(s), Lipid panel, Urine microalbumin, Influenza vaccination for [id number] and Medication adherence over 70%   - Education to patient:   · Discussed foot care  · Reminded to get yearly retinal exam  · Overview of HHP sai  · Benefit/indication for statin in patients with diabetes     Eugene EspinozaD, [facility]  Direct: [phone]  Department, toll free: [phone], option 7     =======================================================   For Pharmacy Admin Tracking Only    PHSO: Yes  Total # of Interventions Recommended: 2  - Updated Order #: 1 Updated Order Reason(s):  Other  Recommended intervention potential cost savings: 1  Total Interventions Accepted: 1  Time Spent (min): 20

## 2021-03-03 ENCOUNTER — TELEPHONE (OUTPATIENT)
Dept: PULMONOLOGY | Age: 51
End: 2021-03-03

## 2021-03-03 ENCOUNTER — VIRTUAL VISIT (OUTPATIENT)
Dept: PULMONOLOGY | Age: 51
End: 2021-03-03
Payer: COMMERCIAL

## 2021-03-03 DIAGNOSIS — G47.33 OSA ON CPAP: Primary | ICD-10-CM

## 2021-03-03 DIAGNOSIS — Z99.89 OSA ON CPAP: Primary | ICD-10-CM

## 2021-03-03 DIAGNOSIS — E66.01 MORBID OBESITY (HCC): ICD-10-CM

## 2021-03-03 DIAGNOSIS — I10 ESSENTIAL HYPERTENSION: ICD-10-CM

## 2021-03-03 PROCEDURE — 99212 OFFICE O/P EST SF 10 MIN: CPT | Performed by: INTERNAL MEDICINE

## 2021-03-03 NOTE — TELEPHONE ENCOUNTER
MA Communication: The following orders are received by verbal communication from Ana Paula Najera MD    Orders include:  Pt. To chose new DME       Get us a CR in May or         June       FU 6/22  Remind pt. To send CR in May or June also see what DME he has changed to.  Then postpone message to 5/22 to schedule appoint for 6/22

## 2021-03-03 NOTE — PROGRESS NOTES
HISTORY OF PRESENT ILLNESS: Chantal George is a 45y.o. year old male with a history of hypertension, obesity who presents for virtual follow-up visit through telephone through Ania. me, was not able to turn his camera on for a video visit through Doxy. me. Follow up visit is for ADALI, morbid obesity. His PCP is Darwin Smith PA-C. Juan Antonio Tamez was last seen by virtual visit on 6/1/2020, had adequate CPAP compliance. He had been told to improve his sleep hygiene, make attempts to lose weight. This visit was scheduled earlier than needed, as part of the annual follow-up visit. The patient has been very compliant, and goes to bed around nine or 10 PM, wears a CPAP all night. He wakes up refreshed, and has no daytime sleepiness. There was no other change in his medical or surgical history since her last visit, the rest of his ROS was unremarkable. On 12/7/2020 renal profile showed elevated glucose, lipids showed low HDL and elevated LDL, ALT was minimally elevated but better than prior. Previous notes reviewed and edited as necessary. Juan Antonio Tamez was last seen on 4/17/2019. He had met CPAP compliance with usage for >4 hours of 83%. 95th percentile pressure was 14.4 cm H2O, AHI was 2.0. Since his last visit, there has been no change in his medical or surgical history. He has not seen his PCP recently, has had no investigations done. The patient uses his CPAP every night. 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 He goes to bed between 1030 and 11 PM, awakens around 6 AM.  He does not watch TV prior to going to bed. The patient is no longer tired or sleepy in the daytime. He does wake up with a dry mouth, is not very sure about adjusting the humidity. He also needs new headgear. His [employer] company was [employer] in [facility]. Due to his wife's change of insurance, he plans to move to [employer]. He has a good appetite, denies GI or  complaints.   He has no new medical problems or medications. His allergies and medications were reviewed. CPAP compliance data 3/3 through 5/31/2020  Usage 90/90 days, 100%. Usage > 4 hours 82 days, 91%. AHI 2.2, 95th percentile pressure 11.9 cm H2O. Previous notes reviewed and edited as necessary. Domenic was placed on auto CPAP, returns for CPAP compliance visit. The patient has been working long hours, barely has 6 hours of sleep. He sometimes gets irritated and takes his CPAP off. His wife sleeps with CPAP, is using a different headgear and a nasal interface, which he wishes to try. He has made some changes in his diet, has lost 5 pounds. The patient says he can still sleep in a car in the daytime if he is a passenger. There is no other change in his medications, medical or surgical history. CPAP compliance data 3/18 through 4/16/19  Usage >4 hours 25/30 days, 83%. 95th percentile pressure was 14.4 cm H2O, AHI 2.0. Previous notes reviewed and edited as necessary. Domenic underwent a home sleep study, results as below. He continues to have nasal congestion, but mentions this is more of a cold in the wong and not true allergies. He also has \"sinus headaches\" in the mornings, almost the same day of the week. There has been no other change in his medications, medical or surgical history. He started treatment for hypertension only recently. HST 2/6/19  Severe sleep apnea with an AHI of 48.5 per hour. Low oxygen saturation 69%, time spent with saturation below 89% 93.7 minutes. He had 15 apneas and 304 hypopneas. Previous notes reviewed and edited as necessary. Suni Tucker is a pleasant 78-year-old male, presently  from his wife lives in [facility]. He has his own lawn care business. He has a long history of loud snoring, possible apneas and was recommended to see a sleep physician by his wife. Presently the patient goes to bed somewhere between 10 PM and midnight, he does watch television after bedtime, but not in bed. He does read on his phone if he is unable to sleep. Once asleep, he sometimes woken up by right hip pain after one hour. He has to get up to go to the bathroom once or twice a night. He wakes up between 6 and 7 AM, his dog wakes him up. He does not feel rested, but works all day, all days of the week and month unless he is rained out. 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 The patient has sleepiness while driving, has pulled over and take a nap occasionally. He has dozed off while driving, but has not had an MVA. During this season, he works from daylight to dark. He has a good appetite, denies any weight gain recently, over the last several years has gained 10-15 pounds. He has no GI or  complaints. He does have allergic rhinitis with stuffy nose and the wong. Last summer once a week on Sundays he would wake up with a \"migraine\" with severe headache and blurry vision. This necessitated ER visits twice or thrice. He recently underwent elbow surgery and he has completed an EGD and colonoscopy with Dr. Ton Rizzo. He is being treated for hypertension, takes over-the-counter medication for GE reflux which only presents when he has a large meal.     The patient has 2 children, live with their mother.       [place] Sleepiness Scale:    Sleep Medicine 6/1/2020 4/17/2019 2/14/2019 1/24/2019   Sitting and reading 1 3 2 3   Watching TV 0 3 3 3   Sitting, inactive in a public place (e.g. a theatre or a meeting) 1 1 1 3   As a passenger in a car for an hour without a break 1 2 3 3   Lying down to rest in the afternoon when circumstances permit 0 3 2 3   Sitting and talking to someone 1 1 1 2   Sitting quietly after a lunch without alcohol 0 0 2 3   In a car, while stopped for a few minutes in traffic 0 0 1 2   Total score 4 13 15 22   Neck circumference - 19 19 19       PAST MEDICAL HISTORY:  Past Medical History:   Diagnosis Date    Cholesterol serum elevated     Depression     Diabetes [facility] ([facility]     Hypertension     Reflux        PAST SURGICAL HISTORY:  Past Surgical History:   Procedure Laterality Date    ANTERIOR CRUCIATE LIGAMENT REPAIR      CARPAL TUNNEL RELEASE  11/15/10    right    CARPAL TUNNEL RELEASE  12/6/10    KNEE ARTHROSCOPY      TONSILLECTOMY      UMBILICAL HERNIA REPAIR N/A 12/22/14    LAPAROSCOPIC WITH MESH       FAMILY HISTORY:  family history includes Diabetes in his brother and father; Heart Disease in his brother; Hypertension in his father; Hyperthyroidism in his daughter; Charlaine Maya in his mother; Stroke in his father. SOCIAL HISTORY:   reports that he has never smoked. He has never used smokeless tobacco.      ALLERGIES:  Patient is allergic to bupropion; doxepin; no known allergies; venlafaxine; and wellbutrin [bupropion hcl]. REVIEW OF SYSTEMS:  Constitutional: Negative for fever, stable weight  HENT: Negative for sore throat, positive for nasal congestion, dry mouth  Eyes: Negative for redness   Respiratory: Negative for dyspnea, cough  Cardiovascular: Negative for chest pain  Gastrointestinal: Negative for vomiting, diarrhea   Genitourinary: Negative for hematuria   Musculoskeletal: Positive for arthralgias, oocasional  Skin: Negative for rash  Neurological: Negative for syncope, no longer has headaches as he uses his CPAP every night. Hematological: Negative for adenopathy  Psychiatric/Behavorial: Negative for anxiety. Objective:   PHYSICAL EXAM:  There were no vitals taken for this visit.'  Physical exam could not be performed as this was a virtual telephonic visit, the patient could not turn on his camera for a video visit. Current Outpatient Medications   Medication Sig Dispense Refill    glipiZIDE (GLUCOTROL) 5 MG tablet Take 2 tablets by mouth 2 times daily 360 tablet 1    valsartan (DIOVAN) 320 MG tablet Take 320 mg by mouth daily      Blood Glucose Monitoring Suppl (PRODIGY AUTOCODE BLOOD GLUCOSE) w/Device KIT Use as directed.  1 kit 0    Prodigy Lancets 28G MISC Use to test blood sugar up to 4 times daily 400 each 3    blood glucose test strips (PRODIGY NO CODING BLOOD GLUC) strip Use to test blood sugar up to 4 times daily 400 each 3     No current facility-administered medications for this visit. Data Reviewed:   CBC and Renal reviewed  Last CBC  Lab Results   Component Value Date    WBC 8.5 12/22/2014    RBC 5.26 12/22/2014    HGB 15.4 12/22/2014    MCV 84.6 12/22/2014     12/22/2014     Last Renal  Lab Results   Component Value Date     12/07/2020    K 4.4 12/07/2020     12/07/2020    CO2 27 12/07/2020    CO2 22 08/18/2020    CO2 23 12/22/2014    BUN 17 12/07/2020    CREATININE 0.7 12/07/2020    GLUCOSE 144 12/07/2020    CALCIUM 9.5 12/07/2020         Assessment:     · ADALI  · Essential hypertension  · Morbid obesity    Plan:      1. ADALI (obstructive sleep apnea)  Documented excellent compliance in the past, good control of his sleep apnea. His equipment cleaning is adequate. He got his equipment from [employer], which has since been closed/sold. I have asked him to find a new [employer] company. He should send us the compliance data around May/June. However his compliance appears to be excellent    2. Inadequate sleep hygiene  No longer watches TV in bed, falls asleep easily    3. Essential hypertension  Per his PCP. 4. Morbid obesity ([address]  Continue to make attempts to lose weight. 5.  Prophylaxis. Has received his flu shot and Pneumovax. RTC annually, call earlier if issues related to his sleep apnea. Hiram Smith is a 45 y.o. male being evaluated by a Virtual Visit (video visit) encounter to address concerns as mentioned above. A caregiver was present when appropriate. Due to this being a TeleHealth encounter (During CNLXV-12 public health emergency), evaluation of the following organ systems was limited: Vitals/Constitutional/EENT/Resp/CV/GI//MS/Neuro/Skin/Heme-Lymph-Imm.   Pursuant to the emergency declaration under the [facility], [address] [place] [address] authority and the [employer] Act, this Virtual Visit was conducted with patient's (and/or legal guardian's) consent, to reduce the patient's risk of exposure to COVID-19 and provide necessary medical care. The patient (and/or legal guardian) has also been advised to contact this office for worsening conditions or problems, and seek emergency medical treatment and/or call 911 if deemed necessary. Patient identification was verified at the start of the visit: Yes    Total time spent for this encounter: 15 minutes    Services were provided through a video synchronous discussion virtually to substitute for in-person clinic visit. Patient and provider were located at their individual homes. --Amie Mart MD on 3/3/2021 at 8:58 AM    An electronic signature was used to authenticate this note. 5/21/2021 addendum:  Received compliance data from 4/21 through 5/20/2021  Usage 30/30 days, 100%, usage >4 hours 24 days, 80%. On auto CPAP 5-20 cm H2O.  95th percentile pressure 12.8, peak 14.8 cm H2O.   AHI 1.3, no

## 2021-03-03 NOTE — LETTER
[facility]  [address] [place] [address] [place] [address]  Phone: [phone]  Fax: [phone]               3/3/21           Patient:  Renny Leon         YOB: 1970                       Dear Kelsey Cisneros PA-C            Renny Leon was seen in a follow up visit today. Below are the relevant portions of           my assessment and plan of care. If you have questions, please do not hesitate to            call me. I look forward to following Renny Leon along with you.            Sincerely             Jovanny Ramos MD
stable

## 2021-03-03 NOTE — PATIENT INSTRUCTIONS
Remember to bring a list of pulmonary medications and any CPAP or BiPAP machines to your next appointment with the office. Please keep all of your future appointments scheduled by [facility] office. Out of respect for other patients and providers, you may be asked to reschedule your appointment if you arrive later than your scheduled appointment time. Appointments cancelled less than 24hrs in advance will be considered a no show. Patients with three missed appointments within 1 year or four missed appointments within 2 years can be dismissed from the practice. You may receive a survey regarding the care you received during your visit. Your input is valuable to us. We encourage you to complete and return your survey. We hope you will choose us in the future for your healthcare needs. Pt instructed of all future appointment dates & times, including radiology, labs, procedures & referrals. If procedures were scheduled preparation instructions provided. Instructions on future appointments with [facility] were given.

## 2021-03-03 NOTE — PROGRESS NOTES
MA Communication: The following orders are received by verbal communication from Geovani Izaguirre MD    Orders include:  Pt.  To chose new DME       Get us a CR in May or         June       FU 6/22

## 2021-04-06 ENCOUNTER — CARE COORDINATION (OUTPATIENT)
Dept: OTHER | Facility: CLINIC | Age: 51
End: 2021-04-06

## 2021-04-07 ENCOUNTER — CARE COORDINATION (OUTPATIENT)
Dept: OTHER | Facility: CLINIC | Age: 51
End: 2021-04-07

## 2021-04-15 ENCOUNTER — CARE COORDINATION (OUTPATIENT)
Dept: OTHER | Facility: CLINIC | Age: 51
End: 2021-04-15

## 2021-04-15 NOTE — CARE COORDINATION
Ambulatory Care Coordination Note  4/15/2021  CM Risk Score: 1  Charlson 10 Year Mortality Risk Score: 10%     ACC: Beni Bradley LPN    Diabetes Assessment    Medic Alert ID: No  Meal Planning: Avoidance of concentrated sweets, Carb counting   How often do you test your blood sugar?: No Testing   Do you have barriers with adherence to non-pharmacologic self-management interventions? (Nutrition/Exercise/Self-Monitoring): No   Have you ever had to go to the ED for symptoms of low blood sugar?: No       Increase or Decrease trend in Blood Sugars   Do you have hyperglycemia symptoms?: No   Do you have hypoglycemia symptoms?: No   Last Blood Sugar Value: 128   Blood Sugar Monitoring Regimen: Not Testing   Blood Sugar Trends: Steady Decrease          Patient stated in the fall of 2020, he completely cut out all added sugars and all soda drinks. Since then, patient's A1C has significantly dropped from 9.0 to 6.5. Patient continues to follow this plan. States he only takes the Glipizide 10mg, 2 AM, 2 PM. He states he doesn't check his sugar much at all anymore. When he does, it is usually between 128-130's. He admits to forgetting a Glipizide dose every now and then. 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 States, \"my daily work is helping bring my numbers too. \"    Plans to continue healthier eating, no soda- states sometimes the only offered beverage is full sugar soda- he states this is very rare. Emailed patient CHO snack list, plate portioning, and know your numbers. Adv that he is due to see Dr. Johan Peng. He will scheduled appt- he is very curious to see how much more his A1C has dropped. Denies using the [employer] sa[employer]. States that, \"I am not very tech/sai-adrianna. \"      Prior to Admission medications    Medication Sig Start Date End Date Taking?  Authorizing Provider   glipiZIDE (GLUCOTROL) 5 MG tablet Take 2 tablets by mouth 2 times daily 12/14/20 Yes Domo Mcwilliams MD   Blood Glucose Monitoring Suppl (PRODIGY AUTOCODE BLOOD GLUCOSE) w/Device KIT Use as directed. Patient taking differently: Does not test often 9/4/20  Yes MD Jessica Santiago Lancets 28G MISC Use to test blood sugar up to 4 times daily  Patient taking differently: Does not test often 9/4/20  Yes Domo Mcwilliams MD   blood glucose test strips (PRODIGY NO CODING BLOOD GLUC) strip Use to test blood sugar up to 4 times daily  Patient taking differently: Does not test often 9/4/20  Yes Domo Mcwilliams MD   valsartan (DIOVAN) 320 MG tablet Take 320 mg by mouth daily   Yes Historical Provider, MD       No future appointments. Chandni Stock, [address] Coordinator  Associate Care Management  [address] [place] [address]  Phone: [phone]  [email or web address]

## 2021-05-06 ENCOUNTER — CARE COORDINATION (OUTPATIENT)
Dept: OTHER | Facility: CLINIC | Age: 51
End: 2021-05-06

## 2021-05-06 NOTE — CARE COORDINATION
Ambulatory Care Coordination Note  5/6/2021  CM Risk Score: 1  Charlson 10 Year Mortality Risk Score: 10%     ACC: EDI MenendezM attempted to reach patient for follow up call regarding Be Well With DM, Needs appt with Brooks Garcia MD, Endo for 3 month f/u and labs including A1C. HIPAA compliant message left requesting a return phone call at patients convenience. Will continue to follow. Chandni Izaguirre [facility], [address] Coordinator  Associate Care Management  [address] [place] [address]  Phone: [phone]  [email or web address]

## 2021-05-14 ENCOUNTER — CARE COORDINATION (OUTPATIENT)
Dept: OTHER | Facility: CLINIC | Age: 51
End: 2021-05-14

## 2021-05-14 NOTE — CARE COORDINATION
Associate Care Manager [facility]) sent [employer] message for Associate Care Management follow up related to be well with DM- upcoming PTO. See patient message for details and response (as appropriate). Chandni Daley, [address] Coordinator  Associate Care Management  [address] [place] [address] Street  Phone: [phone]  [email or web address]

## 2021-05-17 NOTE — TELEPHONE ENCOUNTER
I s/w patient and asked him to take his machine into DME for a download and have them fax it to us. Once we receive the report, we will call him back to schedule his follow up with Dr. Eugenio Jones. Pt was agreeable to this.

## 2021-05-26 ENCOUNTER — TELEPHONE (OUTPATIENT)
Dept: PHARMACY | Facility: CLINIC | Age: 51
End: 2021-05-26

## 2021-05-26 NOTE — LETTER
[address] [facility] 12  [address], Luige Orestes [address]        Jorge Conrad   [address] [facility]  [place] [address]           06/23/21     [address] [place] [address] Diabetes Management Program     Dear Jorge Conrad,     You are currently enrolled in the [address] [place] [address] Be Well with Diabetes Program. Our records indicate that we are missing the requirements listed below. If these requirements are not completed or scheduled by July 1, 2021, then you may be disenrolled from the program:     - First diabetes visit with your physician in 2021   - First A1c result in 2021     You must submit documentation of completion of requirements if your provider does not use the [facility] electronic charting system or if completed outside of the system. Return the documentation to [email or web address] or by fax at [phone]. Please call our office so we can discuss the missing requirements with you to ensure that you will remain enrolled in the [place] [address] Be Well with Diabetes Program.     Thank you,     Tony 2 Team   [phone] Option #7   Email: [email or web address]   Fax Number: [phone]

## 2021-05-26 NOTE — TELEPHONE ENCOUNTER
[facility] CLINICAL PHARMACY REVIEW - BE WELL WITH DIABETES: Statin, ACE/ARB Gaps  =================================================================  Daljit Hensley is a 45 y.o. male enrolled in the [address] [place] [address] Diabetes Program.      Identified care gap: Patient with diabetes not currently filling Statin or ACE/ARB therapy    Pertinent medications  Current Outpatient Medications   Medication Sig Dispense Refill    glipiZIDE (GLUCOTROL) 5 MG tablet Take 2 tablets by mouth 2 times daily 360 tablet 1    Blood Glucose Monitoring Suppl (PRODIGY AUTOCODE BLOOD GLUCOSE) w/Device KIT Use as directed. (Patient taking differently: Does not test often) 1 kit 0    Prodigy Lancets 28G MISC Use to test blood sugar up to 4 times daily (Patient taking differently: Does not test often) 400 each 3    blood glucose test strips (PRODIGY NO CODING BLOOD GLUC) strip Use to test blood sugar up to 4 times daily (Patient taking differently: Does not test often) 400 each 3    valsartan (DIOVAN) 320 MG tablet Take 320 mg by mouth daily       No current facility-administered medications for this visit.         Pertinent Labs/Vitals:  STATIN:  Lab Results   Component Value Date    LDLCALC 125 (H) 12/07/2020    [address] 103 (H) 08/18/2020    LDLCALC see below 07/29/2020     Lab Results   Component Value Date    ALT 43 12/07/2020        The 10-year ASCVD risk score (Carrie Disla et al., 2013) is: 12.7%    Values used to calculate the score:      Age: 45 years      Sex: Male      Is Non- : No      Diabetic: Yes      Tobacco smoker: No      Systolic Blood Pressure: 395 mmHg      Is BP treated: Yes      HDL Cholesterol: 33 mg/dL      Total Cholesterol: 189 mg/dL    ACE/ARB:  BP Readings from Last 3 Encounters:   09/03/20 127/69   08/18/20 130/75   04/17/19 139/89      Lab Results   Component Value Date    CREATININE 0.7 (L) 12/07/2020      CrCl cannot be calculated (Patient's most recent lab result is older than the maximum 120 days allowed. ). Lab Results   Component Value Date    LABMICR <1.20 08/18/2020       Assessment/Outcomes:  No - On statin or contraindication(s) Not identified- Per endo, he will need statin if not at target when he gets next lipid panel  Yes - On ACEi/ARB or contraindication(s) Valsartan- filled 5/2021     MELLO Carcamo, PharmD, [address]  Direct: [phone]  Department, toll free: [phone], option 7

## 2021-05-26 NOTE — TELEPHONE ENCOUNTER
[facility] CLINICAL PHARMACY REVIEW - Be Well with Diabetes    Chi Lloyd is a 45 y.o. male enrolled in the [address] [place] [address],[address] Be Well with Diabetes Program. The goal of this voluntary program is to help employees and covered dependents reach their health maintenance goals in regards to their diabetes diagnosis. According to our records, patient is missing the following requirement(s) that must be completed by July 1, 2021 to avoid discharge from the program:     First diabetes visit with your physician in 2021    First A1c result in 2021     *Documentation of any requirements completed or reviewed outside of the [facility] record (and not available in [facility]) will need to be faxed or emailed to     Plan:  Attempt made to reach patient by telephone to review above. Left voice message for patient to return clinician's phone call to [phone], option 7. [employer] also sent    MELLO Melton, PharmD, [address] [facility]  Direct: [phone]  Department, toll free: [phone], option 7

## 2021-06-01 ENCOUNTER — CARE COORDINATION (OUTPATIENT)
Dept: OTHER | Facility: CLINIC | Age: 51
End: 2021-06-01

## 2021-06-23 NOTE — TELEPHONE ENCOUNTER
[employer] message not read. Will prepare and send as a letter.     Eugene CaldwellD, [address] [facility]  Direct: [phone]  Department, toll free: [phone], option 7

## 2021-06-28 ENCOUNTER — TELEPHONE (OUTPATIENT)
Dept: PHARMACY | Facility: CLINIC | Age: 51
End: 2021-06-28

## 2021-06-28 ENCOUNTER — PATIENT MESSAGE (OUTPATIENT)
Dept: PHARMACY | Facility: CLINIC | Age: 51
End: 2021-06-28

## 2021-06-28 NOTE — TELEPHONE ENCOUNTER
Pharmacy Pop Care Documentation:   Called patient with reminder for requirements for Diabetes Management Program.     According to our records, patient is missing the following requirement(s) that must be completed by July 1st 2021:   · 1st 2021 Provider Visit for DM  · 1st 2021 A1C      Patient not available at the time of call. Left message on home/cell TAD with the above information. [employer] message sent.      Bret Armijo, [employer]   Department, toll free: [phone], option 7

## 2021-07-09 ENCOUNTER — TELEPHONE (OUTPATIENT)
Dept: PHARMACY | Facility: CLINIC | Age: 51
End: 2021-07-09

## 2021-07-09 NOTE — TELEPHONE ENCOUNTER
Pharmacy Pop Care Documentation:   Called patient with reminder for requirements for Diabetes Management Program.     According to our records, patient is missing the following requirement(s) that must be completed by July 1st 2021:   · 1st 2021 Provider Visit for DM  · 1st 2021 A1C      Spoke to patient at mobile number and advised them of the above information. Patient verified understanding. Stated he has not seen provider this year and does not plan on making appointment by July 16th deadline.  Understands this means removal from program.      Kaitlin South, [address]   Department, toll free: [phone], option 7

## 2021-07-13 NOTE — TELEPHONE ENCOUNTER
[employer] message not read by patient. Spoke to patient on 7/09/21 - see encounter. No further patient outreach at this time.     [employer], Via [employer]   Department, toll free: [phone], option 7

## 2021-10-07 ENCOUNTER — TELEPHONE (OUTPATIENT)
Dept: PHARMACY | Facility: CLINIC | Age: 51
End: 2021-10-07

## 2021-10-07 NOTE — TELEPHONE ENCOUNTER
[facility] REVIEW - BE WELL WITH DIABETES  =============================================  Shaanjanelle Peer is a 45 y.o. male enrolled in the [address] [place] [address],[address] Be Well with Diabetes program.    Patient said that he has been seeing his pcp and claimed to have his a1c under control with diet alone. He has been non-compliant all year long and informed me that he had no intention of sending in/completing any of the requirements. Informed him he would be disenrolled at the end of the year, and he requested that we disenroll now. I agreed and will send on to  to complete disenrollment. Thank you,  MELLO Jimenez, PharmD, [address] [place] [address]  Department, toll free: [phone]     Time Spent (min): 10

## 2021-10-29 ENCOUNTER — CLINICAL DOCUMENTATION (OUTPATIENT)
Dept: PHARMACY | Facility: CLINIC | Age: 51
End: 2021-10-29

## 2021-10-29 NOTE — PROGRESS NOTES
Pharmacy Pop Care Documentation:     Darry Frankel is being removed from the diabetes management program for the following reason(s): 10/07/21: Pt Request ZOHAIB Jack

## 2021-10-29 NOTE — LETTER
8613 [facility] 12  [address], [place] [address]   [address] [facility]  [place] [address]           10/29/21     Dear Carmenza Smith,    We regret to inform you that you have been disqualified from The [address] [place] [address] Well With Diabetes Program because the following requirements were not met by the stated deadline:    Per patient request on 10/7/21, no longer wants to be enrolled in program.     You will not receive the copay waiver up to $600 towards your diabetic medications and supplies in 2021. If you qualify once again, you will be eligible to reapply to [facility] Well With Diabetes Program the following calendar year. Tony 2    [phone] Option #3    Email: [email or web address]    Fax Number: [phone]

## 2021-12-09 ENCOUNTER — HOSPITAL ENCOUNTER (OUTPATIENT)
Age: 51
Discharge: HOME | End: 2021-12-09
Payer: COMMERCIAL

## 2021-12-09 LAB
A/G RATIO: 1.6 (ref 1.1–2.2)
ALBUMIN SERPL-MCNC: 4.6 G/DL (ref 3.4–5)
ALP BLD-CCNC: 57 U/L (ref 40–129)
ALT SERPL-CCNC: 56 U/L (ref 10–40)
ANION GAP SERPL CALCULATED.3IONS-SCNC: 8 MMOL/L (ref 3–16)
AST SERPL-CCNC: 31 U/L (ref 15–37)
BASOPHILS ABSOLUTE: 0 K/UL (ref 0–0.2)
BASOPHILS RELATIVE PERCENT: 0.4 %
BILIRUB SERPL-MCNC: 0.3 MG/DL (ref 0–1)
BUN BLDV-MCNC: 18 MG/DL (ref 7–20)
CALCIUM SERPL-MCNC: 9.7 MG/DL (ref 8.3–10.6)
CHLORIDE BLD-SCNC: 103 MMOL/L (ref 99–110)
CHOLESTEROL, TOTAL: 191 MG/DL (ref 0–199)
CO2: 27 MMOL/L (ref 21–32)
CREAT SERPL-MCNC: 0.7 MG/DL (ref 0.9–1.3)
CREATININE URINE: 189.5 MG/DL (ref 39–259)
EOSINOPHILS ABSOLUTE: 0.1 K/UL (ref 0–0.6)
EOSINOPHILS RELATIVE PERCENT: 1.8 %
GFR AFRICAN AMERICAN: >60
GFR NON-AFRICAN AMERICAN: >60
GLUCOSE BLD-MCNC: 142 MG/DL (ref 70–99)
HCT VFR BLD CALC: 45.4 % (ref 40.5–52.5)
HDLC SERPL-MCNC: 33 MG/DL (ref 40–60)
HEMOGLOBIN: 15.5 G/DL (ref 13.5–17.5)
LDL CHOLESTEROL CALCULATED: 122 MG/DL
LYMPHOCYTES ABSOLUTE: 2.2 K/UL (ref 1–5.1)
LYMPHOCYTES RELATIVE PERCENT: 36.3 %
MCH RBC QN AUTO: 28.7 PG (ref 26–34)
MCHC RBC AUTO-ENTMCNC: 34.1 G/DL (ref 31–36)
MCV RBC AUTO: 84.3 FL (ref 80–100)
MICROALBUMIN UR-MCNC: <1.2 MG/DL
MICROALBUMIN/CREAT UR-RTO: NORMAL MG/G (ref 0–30)
MONOCYTES ABSOLUTE: 0.5 K/UL (ref 0–1.3)
MONOCYTES RELATIVE PERCENT: 7.5 %
NEUTROPHILS ABSOLUTE: 3.3 K/UL (ref 1.7–7.7)
NEUTROPHILS RELATIVE PERCENT: 54 %
PDW BLD-RTO: 14.3 % (ref 12.4–15.4)
PLATELET # BLD: 187 K/UL (ref 135–450)
PMV BLD AUTO: 8 FL (ref 5–10.5)
POTASSIUM SERPL-SCNC: 4.5 MMOL/L (ref 3.5–5.1)
PROSTATE SPECIFIC ANTIGEN: 0.65 NG/ML (ref 0–4)
RBC # BLD: 5.38 M/UL (ref 4.2–5.9)
SODIUM BLD-SCNC: 138 MMOL/L (ref 136–145)
TOTAL PROTEIN: 7.5 G/DL (ref 6.4–8.2)
TRIGL SERPL-MCNC: 179 MG/DL (ref 0–150)
TSH SERPL DL<=0.05 MIU/L-ACNC: 0.57 UIU/ML (ref 0.27–4.2)
VLDLC SERPL CALC-MCNC: 36 MG/DL
WBC # BLD: 6 K/UL (ref 4–11)

## 2021-12-09 PROCEDURE — 83036 HEMOGLOBIN GLYCOSYLATED A1C: CPT

## 2021-12-09 PROCEDURE — 85025 COMPLETE CBC W/AUTO DIFF WBC: CPT

## 2021-12-09 PROCEDURE — 80053 COMPREHEN METABOLIC PANEL: CPT

## 2021-12-09 PROCEDURE — 82570 ASSAY OF URINE CREATININE: CPT

## 2021-12-09 PROCEDURE — 84443 ASSAY THYROID STIM HORMONE: CPT

## 2021-12-09 PROCEDURE — 84153 ASSAY OF PSA TOTAL: CPT

## 2021-12-09 PROCEDURE — 80061 LIPID PANEL: CPT

## 2021-12-09 PROCEDURE — 82043 UR ALBUMIN QUANTITATIVE: CPT

## 2021-12-10 LAB
ESTIMATED AVERAGE GLUCOSE: 168.6 MG/DL
HBA1C MFR BLD: 7.5 %

## 2022-02-14 ENCOUNTER — APPOINTMENT (OUTPATIENT)
Dept: GENERAL RADIOLOGY | Age: 52
DRG: 177 | End: 2022-02-14
Payer: COMMERCIAL

## 2022-02-14 ENCOUNTER — HOSPITAL ENCOUNTER (INPATIENT)
Age: 52
LOS: 7 days | Discharge: HOME | DRG: 177 | End: 2022-02-22
Attending: STUDENT IN AN ORGANIZED HEALTH CARE EDUCATION/TRAINING PROGRAM | Admitting: INTERNAL MEDICINE
Payer: COMMERCIAL

## 2022-02-14 ENCOUNTER — NURSE TRIAGE (OUTPATIENT)
Dept: OTHER | Facility: CLINIC | Age: 52
End: 2022-02-14

## 2022-02-14 DIAGNOSIS — U07.1 COVID-19: Primary | ICD-10-CM

## 2022-02-14 DIAGNOSIS — J96.01 ACUTE RESPIRATORY FAILURE WITH HYPOXIA (HCC): ICD-10-CM

## 2022-02-14 LAB
A/G RATIO: 1.3 (ref 1.1–2.2)
ALBUMIN SERPL-MCNC: 4.1 G/DL (ref 3.4–5)
ALP BLD-CCNC: 47 U/L (ref 40–129)
ALT SERPL-CCNC: 35 U/L (ref 10–40)
ANION GAP SERPL CALCULATED.3IONS-SCNC: 11 MMOL/L (ref 3–16)
AST SERPL-CCNC: 35 U/L (ref 15–37)
BASOPHILS ABSOLUTE: 0 K/UL (ref 0–0.2)
BASOPHILS RELATIVE PERCENT: 0.1 %
BILIRUB SERPL-MCNC: 1.7 MG/DL (ref 0–1)
BUN BLDV-MCNC: 14 MG/DL (ref 7–20)
CALCIUM SERPL-MCNC: 8.8 MG/DL (ref 8.3–10.6)
CHLORIDE BLD-SCNC: 97 MMOL/L (ref 99–110)
CO2: 28 MMOL/L (ref 21–32)
CREAT SERPL-MCNC: 0.7 MG/DL (ref 0.9–1.3)
D DIMER: 373 NG/ML DDU (ref 0–229)
EOSINOPHILS ABSOLUTE: 0 K/UL (ref 0–0.6)
EOSINOPHILS RELATIVE PERCENT: 0.1 %
GFR AFRICAN AMERICAN: >60
GFR NON-AFRICAN AMERICAN: >60
GLUCOSE BLD-MCNC: 153 MG/DL (ref 70–99)
HCT VFR BLD CALC: 40.8 % (ref 40.5–52.5)
HEMOGLOBIN: 13.8 G/DL (ref 13.5–17.5)
INFLUENZA A: NOT DETECTED
INFLUENZA B: NOT DETECTED
LACTIC ACID: 1.6 MMOL/L (ref 0.4–2)
LYMPHOCYTES ABSOLUTE: 1.7 K/UL (ref 1–5.1)
LYMPHOCYTES RELATIVE PERCENT: 20.3 %
MCH RBC QN AUTO: 28.5 PG (ref 26–34)
MCHC RBC AUTO-ENTMCNC: 34 G/DL (ref 31–36)
MCV RBC AUTO: 83.9 FL (ref 80–100)
MONOCYTES ABSOLUTE: 0.6 K/UL (ref 0–1.3)
MONOCYTES RELATIVE PERCENT: 7.4 %
NEUTROPHILS ABSOLUTE: 6 K/UL (ref 1.7–7.7)
NEUTROPHILS RELATIVE PERCENT: 72.1 %
PDW BLD-RTO: 14.9 % (ref 12.4–15.4)
PLATELET # BLD: 251 K/UL (ref 135–450)
PMV BLD AUTO: 7.9 FL (ref 5–10.5)
POTASSIUM REFLEX MAGNESIUM: 3.8 MMOL/L (ref 3.5–5.1)
PROCALCITONIN: 0.11 NG/ML (ref 0–0.15)
RBC # BLD: 4.86 M/UL (ref 4.2–5.9)
SARS-COV-2 RNA, RT PCR: DETECTED
SODIUM BLD-SCNC: 136 MMOL/L (ref 136–145)
TOTAL PROTEIN: 7.3 G/DL (ref 6.4–8.2)
TROPONIN: <0.01 NG/ML
WBC # BLD: 8.3 K/UL (ref 4–11)

## 2022-02-14 PROCEDURE — 83036 HEMOGLOBIN GLYCOSYLATED A1C: CPT

## 2022-02-14 PROCEDURE — 84145 PROCALCITONIN (PCT): CPT

## 2022-02-14 PROCEDURE — 93005 ELECTROCARDIOGRAM TRACING: CPT | Performed by: NURSE PRACTITIONER

## 2022-02-14 PROCEDURE — 87636 SARSCOV2 & INF A&B AMP PRB: CPT

## 2022-02-14 PROCEDURE — 85025 COMPLETE CBC W/AUTO DIFF WBC: CPT

## 2022-02-14 PROCEDURE — 6360000002 HC RX W HCPCS: Performed by: NURSE PRACTITIONER

## 2022-02-14 PROCEDURE — 85379 FIBRIN DEGRADATION QUANT: CPT

## 2022-02-14 PROCEDURE — 2580000003 HC RX 258: Performed by: NURSE PRACTITIONER

## 2022-02-14 PROCEDURE — 96374 THER/PROPH/DIAG INJ IV PUSH: CPT

## 2022-02-14 PROCEDURE — 6370000000 HC RX 637 (ALT 250 FOR IP): Performed by: NURSE PRACTITIONER

## 2022-02-14 PROCEDURE — 71045 X-RAY EXAM CHEST 1 VIEW: CPT

## 2022-02-14 PROCEDURE — 84484 ASSAY OF TROPONIN QUANT: CPT

## 2022-02-14 PROCEDURE — 83605 ASSAY OF LACTIC ACID: CPT

## 2022-02-14 PROCEDURE — 96361 HYDRATE IV INFUSION ADD-ON: CPT

## 2022-02-14 PROCEDURE — 80053 COMPREHEN METABOLIC PANEL: CPT

## 2022-02-14 PROCEDURE — 99284 EMERGENCY DEPT VISIT MOD MDM: CPT

## 2022-02-14 PROCEDURE — 36415 COLL VENOUS BLD VENIPUNCTURE: CPT

## 2022-02-14 RX ORDER — IBUPROFEN 600 MG/1
600 TABLET ORAL ONCE
Status: COMPLETED | OUTPATIENT
Start: 2022-02-14 | End: 2022-02-14

## 2022-02-14 RX ORDER — IPRATROPIUM BROMIDE AND ALBUTEROL SULFATE 2.5; .5 MG/3ML; MG/3ML
1 SOLUTION RESPIRATORY (INHALATION) ONCE
Status: COMPLETED | OUTPATIENT
Start: 2022-02-14 | End: 2022-02-14

## 2022-02-14 RX ORDER — 0.9 % SODIUM CHLORIDE 0.9 %
1000 INTRAVENOUS SOLUTION INTRAVENOUS ONCE
Status: COMPLETED | OUTPATIENT
Start: 2022-02-14 | End: 2022-02-14

## 2022-02-14 RX ORDER — DEXAMETHASONE SODIUM PHOSPHATE 10 MG/ML
10 INJECTION, SOLUTION INTRAMUSCULAR; INTRAVENOUS ONCE
Status: COMPLETED | OUTPATIENT
Start: 2022-02-14 | End: 2022-02-14

## 2022-02-14 RX ADMIN — IPRATROPIUM BROMIDE AND ALBUTEROL SULFATE 1 AMPULE: .5; 2.5 SOLUTION RESPIRATORY (INHALATION) at 21:10

## 2022-02-14 RX ADMIN — SODIUM CHLORIDE 1000 ML: 9 INJECTION, SOLUTION INTRAVENOUS at 21:20

## 2022-02-14 RX ADMIN — IBUPROFEN 600 MG: 600 TABLET ORAL at 21:19

## 2022-02-14 RX ADMIN — DEXAMETHASONE SODIUM PHOSPHATE 10 MG: 10 INJECTION INTRAMUSCULAR; INTRAVENOUS at 21:21

## 2022-02-14 ASSESSMENT — ENCOUNTER SYMPTOMS
SHORTNESS OF BREATH: 1
DIARRHEA: 0
NAUSEA: 0
EYE PAIN: 0
ABDOMINAL PAIN: 0
VOMITING: 0
COUGH: 0
BACK PAIN: 0
BLOOD IN STOOL: 0
RHINORRHEA: 0
SORE THROAT: 0

## 2022-02-14 ASSESSMENT — PAIN SCALES - GENERAL
PAINLEVEL_OUTOF10: 8
PAINLEVEL_OUTOF10: 8

## 2022-02-14 NOTE — TELEPHONE ENCOUNTER
Subjective: Caller states \"My dad was diagnosed with COVID on Friday and is on abx. Today he is having SOB and the PCP ordered a CXR and now his O2 levels are in the 80s. He is currently at 89%. \"      Current Symptoms: SOB     Onset: 2 days ago; worsening    Associated Symptoms: reduced activity    Pain Severity: severe HA     Temperature: feels feverish, chills on and off and no thermometer      What has been tried: Pt is on abx for COVID       Recommended disposition: ED now for SOB that is moderate and worsening. Care advice provided, patient verbalizes understanding; denies any other questions or concerns; instructed to call back for any new or worsening symptoms. Patient/caller proceeding to [facility]  Emergency Department    Attention Provider: Thank you for allowing me to participate in the care of your patient. The patient was connected to triage in response to symptoms provided. Please do not respond through this encounter as the response is not directed to a shared pool.       Reason for Disposition   MODERATE difficulty breathing (e.g., speaks in phrases, SOB even at rest, pulse 100-120)    Protocols used: CORONAVIRUS (COVID-19) DIAGNOSED OR SUSPECTED-ADULT-

## 2022-02-15 ENCOUNTER — APPOINTMENT (OUTPATIENT)
Dept: CT IMAGING | Age: 52
DRG: 177 | End: 2022-02-15
Payer: COMMERCIAL

## 2022-02-15 PROBLEM — J96.00 ACUTE RESPIRATORY FAILURE DUE TO COVID-19 (HCC): Status: ACTIVE | Noted: 2022-02-15

## 2022-02-15 PROBLEM — U07.1 ACUTE RESPIRATORY FAILURE DUE TO COVID-19 (HCC): Status: ACTIVE | Noted: 2022-02-15

## 2022-02-15 LAB
EKG ATRIAL RATE: 102 BPM
EKG DIAGNOSIS: NORMAL
EKG P AXIS: 35 DEGREES
EKG P-R INTERVAL: 140 MS
EKG Q-T INTERVAL: 348 MS
EKG QRS DURATION: 100 MS
EKG QTC CALCULATION (BAZETT): 453 MS
EKG R AXIS: -1 DEGREES
EKG T AXIS: 23 DEGREES
EKG VENTRICULAR RATE: 102 BPM
GLUCOSE BLD-MCNC: 137 MG/DL (ref 70–99)
GLUCOSE BLD-MCNC: 193 MG/DL (ref 70–99)
GLUCOSE BLD-MCNC: 196 MG/DL (ref 70–99)
PERFORMED ON: ABNORMAL

## 2022-02-15 PROCEDURE — 94640 AIRWAY INHALATION TREATMENT: CPT

## 2022-02-15 PROCEDURE — 6360000002 HC RX W HCPCS: Performed by: INTERNAL MEDICINE

## 2022-02-15 PROCEDURE — 94761 N-INVAS EAR/PLS OXIMETRY MLT: CPT

## 2022-02-15 PROCEDURE — 6370000000 HC RX 637 (ALT 250 FOR IP): Performed by: INTERNAL MEDICINE

## 2022-02-15 PROCEDURE — 71260 CT THORAX DX C+: CPT

## 2022-02-15 PROCEDURE — 93010 ELECTROCARDIOGRAM REPORT: CPT | Performed by: INTERNAL MEDICINE

## 2022-02-15 PROCEDURE — 6360000004 HC RX CONTRAST MEDICATION: Performed by: INTERNAL MEDICINE

## 2022-02-15 PROCEDURE — 1200000000 HC SEMI PRIVATE

## 2022-02-15 PROCEDURE — 2580000003 HC RX 258: Performed by: INTERNAL MEDICINE

## 2022-02-15 PROCEDURE — 2700000000 HC OXYGEN THERAPY PER DAY

## 2022-02-15 RX ORDER — ALBUTEROL SULFATE 90 UG/1
2 AEROSOL, METERED RESPIRATORY (INHALATION)
Status: DISCONTINUED | OUTPATIENT
Start: 2022-02-15 | End: 2022-02-15

## 2022-02-15 RX ORDER — POLYETHYLENE GLYCOL 3350 17 G/17G
17 POWDER, FOR SOLUTION ORAL DAILY PRN
Status: DISCONTINUED | OUTPATIENT
Start: 2022-02-15 | End: 2022-02-22 | Stop reason: HOSPADM

## 2022-02-15 RX ORDER — ALBUTEROL SULFATE 90 UG/1
2 AEROSOL, METERED RESPIRATORY (INHALATION) 2 TIMES DAILY
Status: DISCONTINUED | OUTPATIENT
Start: 2022-02-16 | End: 2022-02-16

## 2022-02-15 RX ORDER — DEXTROSE MONOHYDRATE 25 G/50ML
12.5 INJECTION, SOLUTION INTRAVENOUS PRN
Status: DISCONTINUED | OUTPATIENT
Start: 2022-02-15 | End: 2022-02-15 | Stop reason: ALTCHOICE

## 2022-02-15 RX ORDER — GUAIFENESIN/DEXTROMETHORPHAN 100-10MG/5
5 SYRUP ORAL EVERY 4 HOURS PRN
Status: DISCONTINUED | OUTPATIENT
Start: 2022-02-15 | End: 2022-02-22 | Stop reason: HOSPADM

## 2022-02-15 RX ORDER — ONDANSETRON 2 MG/ML
4 INJECTION INTRAMUSCULAR; INTRAVENOUS EVERY 6 HOURS PRN
Status: DISCONTINUED | OUTPATIENT
Start: 2022-02-15 | End: 2022-02-22 | Stop reason: HOSPADM

## 2022-02-15 RX ORDER — ONDANSETRON 4 MG/1
4 TABLET, ORALLY DISINTEGRATING ORAL EVERY 8 HOURS PRN
Status: DISCONTINUED | OUTPATIENT
Start: 2022-02-15 | End: 2022-02-22 | Stop reason: HOSPADM

## 2022-02-15 RX ORDER — SODIUM CHLORIDE 0.9 % (FLUSH) 0.9 %
5-40 SYRINGE (ML) INJECTION PRN
Status: DISCONTINUED | OUTPATIENT
Start: 2022-02-15 | End: 2022-02-19 | Stop reason: SDUPTHER

## 2022-02-15 RX ORDER — ACETAMINOPHEN 650 MG/1
650 SUPPOSITORY RECTAL EVERY 6 HOURS PRN
Status: DISCONTINUED | OUTPATIENT
Start: 2022-02-15 | End: 2022-02-22 | Stop reason: HOSPADM

## 2022-02-15 RX ORDER — DEXTROSE MONOHYDRATE 50 MG/ML
100 INJECTION, SOLUTION INTRAVENOUS PRN
Status: DISCONTINUED | OUTPATIENT
Start: 2022-02-15 | End: 2022-02-22 | Stop reason: HOSPADM

## 2022-02-15 RX ORDER — SODIUM CHLORIDE 9 MG/ML
25 INJECTION, SOLUTION INTRAVENOUS PRN
Status: DISCONTINUED | OUTPATIENT
Start: 2022-02-15 | End: 2022-02-19 | Stop reason: SDUPTHER

## 2022-02-15 RX ORDER — VITAMIN B COMPLEX
2000 TABLET ORAL DAILY
Status: DISCONTINUED | OUTPATIENT
Start: 2022-02-15 | End: 2022-02-22 | Stop reason: HOSPADM

## 2022-02-15 RX ORDER — DEXAMETHASONE SODIUM PHOSPHATE 10 MG/ML
6 INJECTION, SOLUTION INTRAMUSCULAR; INTRAVENOUS EVERY 24 HOURS
Status: DISCONTINUED | OUTPATIENT
Start: 2022-02-15 | End: 2022-02-22 | Stop reason: HOSPADM

## 2022-02-15 RX ORDER — SODIUM CHLORIDE 0.9 % (FLUSH) 0.9 %
5-40 SYRINGE (ML) INJECTION EVERY 12 HOURS SCHEDULED
Status: DISCONTINUED | OUTPATIENT
Start: 2022-02-15 | End: 2022-02-19 | Stop reason: SDUPTHER

## 2022-02-15 RX ORDER — ASCORBIC ACID 500 MG
500 TABLET ORAL DAILY
Status: DISCONTINUED | OUTPATIENT
Start: 2022-02-15 | End: 2022-02-22 | Stop reason: HOSPADM

## 2022-02-15 RX ORDER — HYDROCODONE BITARTRATE AND ACETAMINOPHEN 5; 325 MG/1; MG/1
1 TABLET ORAL EVERY 6 HOURS PRN
Status: DISCONTINUED | OUTPATIENT
Start: 2022-02-15 | End: 2022-02-17

## 2022-02-15 RX ORDER — ACETAMINOPHEN 325 MG/1
650 TABLET ORAL EVERY 6 HOURS PRN
Status: DISCONTINUED | OUTPATIENT
Start: 2022-02-15 | End: 2022-02-22 | Stop reason: HOSPADM

## 2022-02-15 RX ORDER — ALBUTEROL SULFATE 90 UG/1
2 AEROSOL, METERED RESPIRATORY (INHALATION) EVERY 6 HOURS PRN
Status: DISCONTINUED | OUTPATIENT
Start: 2022-02-15 | End: 2022-02-22 | Stop reason: HOSPADM

## 2022-02-15 RX ORDER — NICOTINE POLACRILEX 4 MG
15 LOZENGE BUCCAL PRN
Status: DISCONTINUED | OUTPATIENT
Start: 2022-02-15 | End: 2022-02-22 | Stop reason: HOSPADM

## 2022-02-15 RX ORDER — SODIUM CHLORIDE 9 MG/ML
INJECTION, SOLUTION INTRAVENOUS CONTINUOUS
Status: DISCONTINUED | OUTPATIENT
Start: 2022-02-15 | End: 2022-02-16

## 2022-02-15 RX ADMIN — Medication 2000 UNITS: at 08:43

## 2022-02-15 RX ADMIN — ACETAMINOPHEN 650 MG: 325 TABLET ORAL at 20:54

## 2022-02-15 RX ADMIN — DEXAMETHASONE SODIUM PHOSPHATE 6 MG: 10 INJECTION INTRAMUSCULAR; INTRAVENOUS at 20:55

## 2022-02-15 RX ADMIN — HYDROCODONE BITARTRATE AND ACETAMINOPHEN 1 TABLET: 5; 325 TABLET ORAL at 17:44

## 2022-02-15 RX ADMIN — SODIUM CHLORIDE: 9 INJECTION, SOLUTION INTRAVENOUS at 08:47

## 2022-02-15 RX ADMIN — SODIUM CHLORIDE: 9 INJECTION, SOLUTION INTRAVENOUS at 20:54

## 2022-02-15 RX ADMIN — ENOXAPARIN SODIUM 30 MG: 100 INJECTION SUBCUTANEOUS at 08:43

## 2022-02-15 RX ADMIN — INSULIN LISPRO 1 UNITS: 100 INJECTION, SOLUTION INTRAVENOUS; SUBCUTANEOUS at 08:51

## 2022-02-15 RX ADMIN — ACETAMINOPHEN 650 MG: 325 TABLET ORAL at 14:37

## 2022-02-15 RX ADMIN — Medication 2 PUFF: at 12:22

## 2022-02-15 RX ADMIN — GUAIFENESIN AND DEXTROMETHORPHAN 5 ML: 100; 10 SYRUP ORAL at 20:54

## 2022-02-15 RX ADMIN — SODIUM CHLORIDE, PRESERVATIVE FREE 10 ML: 5 INJECTION INTRAVENOUS at 08:48

## 2022-02-15 RX ADMIN — Medication 2 PUFF: at 12:23

## 2022-02-15 RX ADMIN — ENOXAPARIN SODIUM 30 MG: 100 INJECTION SUBCUTANEOUS at 20:55

## 2022-02-15 RX ADMIN — OXYCODONE HYDROCHLORIDE AND ACETAMINOPHEN 500 MG: 500 TABLET ORAL at 08:43

## 2022-02-15 RX ADMIN — SODIUM CHLORIDE, PRESERVATIVE FREE 10 ML: 5 INJECTION INTRAVENOUS at 20:48

## 2022-02-15 RX ADMIN — IOPAMIDOL 85 ML: 755 INJECTION, SOLUTION INTRAVENOUS at 01:19

## 2022-02-15 RX ADMIN — INSULIN LISPRO 1 UNITS: 100 INJECTION, SOLUTION INTRAVENOUS; SUBCUTANEOUS at 12:40

## 2022-02-15 ASSESSMENT — PAIN DESCRIPTION - ONSET: ONSET: ON-GOING

## 2022-02-15 ASSESSMENT — PAIN DESCRIPTION - PAIN TYPE: TYPE: ACUTE PAIN

## 2022-02-15 ASSESSMENT — PAIN SCALES - GENERAL
PAINLEVEL_OUTOF10: 0
PAINLEVEL_OUTOF10: 3
PAINLEVEL_OUTOF10: 4
PAINLEVEL_OUTOF10: 0
PAINLEVEL_OUTOF10: 3
PAINLEVEL_OUTOF10: 6

## 2022-02-15 ASSESSMENT — PAIN DESCRIPTION - DESCRIPTORS: DESCRIPTORS: HEADACHE

## 2022-02-15 ASSESSMENT — PAIN DESCRIPTION - FREQUENCY: FREQUENCY: CONTINUOUS

## 2022-02-15 ASSESSMENT — PAIN DESCRIPTION - PROGRESSION: CLINICAL_PROGRESSION: GRADUALLY IMPROVING

## 2022-02-15 NOTE — ED NOTES
Bed: 15  Expected date:   Expected time:   Means of arrival:   Comments:     Marielena Forbes RN  02/14/22 2107

## 2022-02-15 NOTE — ED NOTES
Rounded on the patient and discussed his ED visit. He was upset about his care overnight but states that things have gotten better during the day. He notes a headache 6/10 at this time. PRN tyenol will be given. He reports that he feels hot, fan currently on in his room. His call light is in reach and he denies other needs at this time.       Rolf Barriga, SIMBA  02/15/22 [id number]

## 2022-02-15 NOTE — H&P
Hospital Medicine History & Physical      PCP: RODNEY MERCHANT PA-C    Date of Service: Pt seen/examined on 2/15/22 and admitted on 2/15/22 to Inpatient    Chief Complaint   Patient presents with    Positive For Covid-19     pt c/o increased H/A w cough. s/s x 7 days. covid + 3 days ago. no relief w rx meds. completed prednisone. on augmentin no relief w OTC cold/cough meds. no resp distress. MDI at home w no relief. History Of Present Illness: The patient is a 45 y.o. male with PMH below, presents with fatigue, myalgias, SOB, cough, COVID +, HA, decreased appetite/PO intake. PT reports COVID like Sx which started ~7d ago. He was Dx at his PCP's office 3 d ago but we are unable to verify this result. Tested positive in ED by PCR. He was started on Augmentin, alb INH, prednisone by his PCP 3 days ago. All of the above Sx are progressively worsening. HA is not worst of life and denied \"thunderclap\" onset. Past Medical History:        Diagnosis Date    Cholesterol serum elevated     Depression     Diabetes mellitus ([facility] [address]     Hypertension     Reflux        Past Surgical History:        Procedure Laterality Date    ANTERIOR CRUCIATE LIGAMENT REPAIR      CARPAL TUNNEL RELEASE  11/15/10    right    CARPAL TUNNEL RELEASE  12/6/10    KNEE ARTHROSCOPY      TONSILLECTOMY      UMBILICAL HERNIA REPAIR N/A 12/22/14    LAPAROSCOPIC WITH MESH       Medications Prior to Admission:    Prior to Admission medications    Medication Sig Start Date End Date Taking? Authorizing Provider   glipiZIDE (GLUCOTROL) 5 MG tablet Take 2 tablets by mouth 2 times daily 12/14/20  Yes Octavio Esquivel MD   Blood Glucose Monitoring Suppl (PRODIGY AUTOCODE BLOOD GLUCOSE) w/Device KIT Use as directed.   Patient taking differently: Does not test often 9/4/20  Yes MD Jessica Stoddard Lancets 28G MISC Use to test blood sugar up to 4 times daily  Patient taking differently: Does not test often 9/4/20  Yes Angy Mccarthy Omaira Tate MD   blood glucose test strips (PRODIGY NO CODING BLOOD GLUC) strip Use to test blood sugar up to 4 times daily  Patient taking differently: Does not test often 9/4/20  Yes Libby Tyler MD   valsartan (DIOVAN) 320 MG tablet Take 320 mg by mouth daily   Yes Historical Provider, MD       Allergies:  Doxepin, No known allergies, Venlafaxine, and Wellbutrin [bupropion hcl]    Social History:    TOBACCO:   reports that he has never smoked. He has never used smokeless tobacco.  ETOH:   reports current alcohol use. Family History:  Reviewed in detail and negative for DM, Early CAD, Cancer (except as below). Positive as follows:        Problem Relation Age of Onset    Lung Cancer Mother     Diabetes Father     Hypertension Father     Stroke Father     Diabetes Brother     Heart Disease Brother     Hyperthyroidism Daughter        REVIEW OF SYSTEMS:   Pertinent positives/negatives as follows: fatigue, myalgias, SOB, cough, COVID +, HA, decreased appetite/PO intake, and as discussed in HPI, otherwise a complete ROS performed and all other systems are negative. PHYSICAL EXAM PERFORMED:  BP (!) 148/64   Pulse 95   Temp 98.2 °F (36.8 °C)   Resp 18   Wt (!) 340 lb (154.2 kg)   SpO2 90%   BMI 41.39 kg/m²     GEN:  A&Ox3, mild increased WOB. Obese. HEENT:  NC/AT,EOMI, semi dry MM, no erythema/exudates or visible masses. CVS:  Normal S1,S2. Mild tachy RRR. Without M/G/R.   LUNG:   CTA-B. no wheezes, rales or rhonchi. Diminished at bases. Tachypnea. ABD:  Soft, ND/NT, BS+ x4. Without G/R.  EXT: 2+ pulses, no c/c/e. Brisk cap refill. PSY:  Thought process intact, affect appropriate. CORRIE:  CN III-XII intact, moves all 4 spontaneously, sensory grossly intact. SKIN: No rash or lesions on visible skin. Chart review shows recent radiographs:  XR CHEST 1 VIEW    Result Date: 2/14/2022  EXAMINATION: ONE XRAY VIEW OF THE CHEST 2/14/2022 4:21 pm COMPARISON: None.  HISTORY: ORDERING SYSTEM PROVIDED HISTORY: covid + cough TECHNOLOGIST PROVIDED HISTORY: Reason for exam:->covid + cough Reason for Exam: covid FINDINGS: Scattered multifocal airspace opacities. No pneumothorax or pleural effusion. The cardiomediastinal silhouette is normal.     Scattered multifocal airspace opacities compatible with multifocal pneumonia. CT CHEST PULMONARY EMBOLISM W CONTRAST    Result Date: 2/15/2022  EXAMINATION: CTA OF THE CHEST 2/15/2022 1:18 am TECHNIQUE: CTA of the chest was performed after the administration of intravenous contrast.  Multiplanar reformatted images are provided for review. MIP images are provided for review. Dose modulation, iterative reconstruction, and/or weight based adjustment of the mA/kV was utilized to reduce the radiation dose to as low as reasonably achievable. COMPARISON: None. HISTORY: ORDERING SYSTEM PROVIDED HISTORY: shortness of breath TECHNOLOGIST PROVIDED HISTORY: Reason for exam:->shortness of breath Decision Support Exception - unselect if not a suspected or confirmed emergency medical condition->Emergency Medical Condition (MA) Reason for Exam: covid FINDINGS: Pulmonary Arteries: Pulmonary arteries are adequately opacified for evaluation. No evidence of intraluminal filling defect to suggest pulmonary embolism. Main pulmonary artery is normal in caliber. Mediastinum: No evidence of mediastinal lymphadenopathy. The heart and pericardium demonstrate no acute abnormality. There is no acute abnormality of the thoracic aorta. Lungs/pleura: Scattered areas of patchy and ground-glass opacities are noted throughout the bilateral lungs. These are nonspecific but could indicate an atypical/viral pneumonia, inclusive of COVID-19. No pulmonary mass or lesley consolidation. No pleural effusion. Upper Abdomen: Limited images of the upper abdomen are unremarkable. Soft Tissues/Bones: No acute bone or soft tissue abnormality. No evidence of pulmonary embolism.  Scattered areas of patchy and ground-glass opacities are noted throughout the bilateral lungs. These are nonspecific but could indicate an atypical/viral pneumonia, inclusive of COVID-19. EKG:    EKG 12 Lead [[id number]]    Collected: 02/14/22 2135    Updated: 02/14/22 2209     Ventricular Rate 102 BPM    Atrial Rate 102 BPM    P-R Interval 140 ms    QRS Duration 100 ms    Q-T Interval 348 ms    QTc Calculation (Bazett) 453 ms    P Axis 35 degrees    R Axis -1 degrees    T Axis 23 degrees    Diagnosis Sinus tachycardiaPossible Left atrial enlargementBorderline ECGNo previous ECGs available     CBC:  Recent Labs     02/14/22 2125   WBC 8.3   HGB 13.8   HCT 40.8           RENAL  Recent Labs     02/14/22 2125      K 3.8   CL 97*   CO2 28   BUN 14   CREATININE 0.7*   GLUCOSE 153*       Hemoglobin a1c:  Lab Results   Component Value Date    LABA1C 7.5 12/09/2021    LABA1C 6.5 12/07/2020    LABA1C 9.0 08/18/2020       LFT'S:  Recent Labs     02/14/22 2125   AST 35   ALT 35   BILITOT 1.7*   ALKPHOS 47     CARDIAC ENZYMES:  Recent Labs     02/14/22 2125   TROPONINI <0.01     LACTIC ACID:  Recent Labs     02/14/22 2125   LACTA 1.6     PHYSICIAN CERTIFICATION  I certify that Summer Dixon is expected to be hospitalized for 2 midnights based on the following assessment and plan:    ASSESSMENT/PLAN:  1. Acute respiratory failure with hypoxia, likely related to COVID, supplemental O2 to maintain SPO2 ? 92%, continuous pulse ox. Satting 88% on RA in the ER. Currently requiring 2 L O2. Patient normally not on O2 at home. Wean as tolerated. 2. COVID PNA, Dx on 2/11 at PCP's office (unavailable), confirmed here on 2/14 by PCR. Sx for ~7 d. Outside of remdesivir window. IBD, Lx, droplet, Robitussin DM, decadron. 3. Elevated d-dimer, 373. CTA neg for PE. Likely 2/2 COVID. 4. DM2, hold oral Rx, chk A1c, add Low SSI (may need increased). Held off on Lantus as BGT in 150's and he is insulin naive.     5. Morbid Obesity (BMI is 41.4 kg/m²). Counseled wt loss. Complicating assessment and treatment. Placing patient at risk for multiple comorbidities as well as early death and contributing to the patients presentation. DVT Prophylaxis: int Lx dose  Diet: CC  Code Status: Full Code   PT/OT Eval Status: Will order if needed and as patient condition allows  Dispo - Admit to inpatient     Joanne Green MD    Thank you RODNEY MERCHANT PA-C for the opportunity to be involved in this patient's care. If you have any questions or concerns please feel free to contact me via the [employer] at [phone]. This chart was generated using the [address] [place] [address] dictation system. I created this record but it may contain dictation errors given the limitations of this technology.

## 2022-02-15 NOTE — FLOWSHEET NOTE
02/15/22 0830   Vital Signs   Temp 97.4 °F (36.3 °C)   Temp Source Oral   Pulse 76   Heart Rate Source Monitor   Resp 16   /75   Patient Currently in Pain Denies   Oxygen Therapy   SpO2 92 %   O2 Device Nasal cannula   O2 Flow Rate (L/min) 2 L/min     Alert and oriented x4. Skin w/d. resp e/e unlabored. Frequent dry cough with diminished lung sounds. Cont b/b. Breakfast tray ordered. No s/s distress noted. Telemetry in place. On 2L per n/c. Nursing asmt completed. Call light in easy reach.

## 2022-02-15 NOTE — ACP (ADVANCE CARE PLANNING)
Advance Care Planning     General Advance Care Planning (ACP) Conversation    Date of Conversation: 2/14/2022  Conducted with: Patient with Decision Making Capacity    Healthcare Decision Maker:    Primary Decision Maker: Kristine Whitaker - Spouse - [phone]  Click here to complete Healthcare Decision Makers including selection of the Healthcare Decision Maker Relationship (ie \"Primary\"). Today we documented Decision Maker(s) consistent with Legal Next of Kin hierarchy. Content/Action Overview: Has ACP document(s) on file - reflects the patient's care preferences  Reviewed DNR/DNI and patient elects Full Code (Attempt Resuscitation)  ventilation preferences and resuscitation preferences  Pt elects to be Full Code and wants CPR if his heart should stop and intubation if indicated.      Length of Voluntary ACP Conversation in minutes:  16 minutes    Karla Johnson RN

## 2022-02-15 NOTE — ED NOTES
Patient reports headache is better but still not gone.  Provider Dr Molina Roles sent perfect serve in regards to this     Mary Steven RN  02/15/22 [id number]

## 2022-02-15 NOTE — ED PROVIDER NOTES
[facility] ICU  EMERGENCY DEPARTMENT ENCOUNTER        Pt Name: Frederick Moscoso  MRN: [id number]  [place] 1970  Date of evaluation: 2/14/2022  Provider: YASMANI Cheng - MANDI  PCP: Chelsea Dubois PA-C  Note Started: 10:27 PM EST       I have seen and evaluated this patient with my supervising physician Donald Crowell MD.      [address]       Chief Complaint   Patient presents with    Positive For Covid-19     pt c/o increased H/A w cough. s/s x 7 days. covid + 3 days ago. no relief w rx meds. completed prednisone. on augmentin no relief w OTC cold/cough meds. no resp distress. MDI at home w no relief. HISTORY OF PRESENT ILLNESS   (Location/Symptom, Timing/Onset, Context/Setting, Quality, Duration, Modifying Factors, Severity)  Note limiting factors. Chief Complaint: Shortness of breath    Frederick Moscoso is a 45 y.o. male who presents to the emergency department with symptoms of shortness of breath. It was noted that the patient began with symptoms COVID-19 approximately 7 days ago. Reported that 3 days ago he was tested positive for COVID-19. States he went to see his family doctor on Friday and they prescribed him prednisone, Augmentin, and albuterol inhaler. He states that through his treatment this weekend he had reported no improvement. He states that the symptoms actually worsened. Reports a nonproductive cough. States he has general body aches and general headache. Denies any hematemesis hemoptysis. States he has had a poor appetite but denies any vomiting or diarrhea. No abdominal pain. Denies chest pain. Patient states he does have a history of high blood pressure and has a non-insulin-dependent diabetic. Nursing Notes were all reviewed and agreed with or any disagreements were addressed in the HPI. REVIEW OF SYSTEMS    (2-9 systems for level 4, 10 or more for level 5)     Review of Systems   Constitutional: Negative for chills, diaphoresis and fever.    HENT: Negative for congestion, ear pain, rhinorrhea and sore throat. Eyes: Negative for pain and visual disturbance. Respiratory: Positive for shortness of breath. Negative for cough. Cardiovascular: Negative for chest pain and leg swelling. Gastrointestinal: Negative for abdominal pain, blood in stool, diarrhea, nausea and vomiting. Genitourinary: Negative for difficulty urinating, dysuria, flank pain and frequency. Musculoskeletal: Negative for back pain and neck pain. Skin: Negative for rash and wound. Neurological: Negative for dizziness and light-headedness. PAST MEDICAL HISTORY     Past Medical History:   Diagnosis Date    Cholesterol serum elevated     Depression     Diabetes mellitus ([address]     Hypertension     Reflux        SURGICAL HISTORY     Past Surgical History:   Procedure Laterality Date    ANTERIOR CRUCIATE LIGAMENT REPAIR      CARPAL TUNNEL RELEASE  11/15/10    right    CARPAL TUNNEL RELEASE  12/6/10    KNEE ARTHROSCOPY      TONSILLECTOMY      UMBILICAL HERNIA REPAIR N/A 12/22/14    LAPAROSCOPIC WITH MESH       [address]       Current Discharge Medication List      CONTINUE these medications which have NOT CHANGED    Details   glipiZIDE (GLUCOTROL) 5 MG tablet Take 2 tablets by mouth 2 times daily  Qty: 360 tablet, Refills: 1      valsartan (DIOVAN) 320 MG tablet Take 320 mg by mouth daily      Blood Glucose Monitoring Suppl (PRODIGY AUTOCODE BLOOD GLUCOSE) w/Device KIT Use as directed.   Qty: 1 kit, Refills: 0      Prodigy Lancets 28G MISC Use to test blood sugar up to 4 times daily  Qty: 400 each, Refills: 3      blood glucose test strips (PRODIGY NO CODING BLOOD GLUC) strip Use to test blood sugar up to 4 times daily  Qty: 400 each, Refills: 3             ALLERGIES     Doxepin, No known allergies, Venlafaxine, and Wellbutrin [bupropion hcl]    FAMILYHISTORY       Family History   Problem Relation Age of Onset    Lung Cancer Mother     Diabetes Father    Kimberly Current Hypertension Father     Stroke Father     Diabetes Brother     Heart Disease Brother     Hyperthyroidism Daughter         SOCIAL HISTORY       Social History     Socioeconomic History    Marital status:      Spouse name: None    Number of children: None    Years of education: None    Highest education level: None   Occupational History    None   Tobacco Use    Smoking status: Never Smoker    Smokeless tobacco: Never Used   Vaping Use    Vaping Use: Never used   Substance and Sexual Activity    Alcohol use: Yes     Comment: Very occasional    Drug use: No    Sexual activity: None   Other Topics Concern    None   Social History Narrative    None     Social Determinants of Health     Financial Resource Strain:     Difficulty of Paying Living Expenses: Not on file   Food Insecurity:     Worried About Running Out of Food in the Last Year: Not on file    Nata of Food in the Last Year: Not on file   Transportation Needs:     Lack of Transportation (Medical): Not on file    Lack of Transportation (Non-Medical):  Not on file   Physical Activity:     Days of Exercise per Week: Not on file    Minutes of Exercise per Session: Not on file   Stress:     Feeling of Stress : Not on file   Social Connections:     Frequency of Communication with Friends and Family: Not on file    Frequency of Social Gatherings with Friends and Family: Not on file    Attends Anabaptist Services: Not on file    Active Member of [address] [place] [address] or Organizations: Not on file    Attends Club or Organization Meetings: Not on file    Marital Status: Not on file   Intimate Partner Violence:     Fear of Current or Ex-Partner: Not on file    Emotionally Abused: Not on file    Physically Abused: Not on file    Sexually Abused: Not on file   Housing Stability:     Unable to Pay for Housing in the Last Year: Not on file    Number of [place] in the Last Year: Not on file    Unstable Housing in the Last Year: Not on file SCREENINGS    Samy Coma Scale  Eye Opening: Spontaneous  Best Verbal Response: Oriented  Best Motor Response: Obeys commands  Samy Coma Scale Score: 15        PHYSICAL EXAM    (up to 7 for level 4, 8 or more for level 5)     ED Triage Vitals [02/14/22 1537]   BP Temp Temp src Pulse Resp SpO2 Height Weight   (!) 153/91 98.2 °F (36.8 °C) -- 95 22 92 % -- (!) 340 lb (154.2 kg)       Physical Exam  Vitals and nursing note reviewed. Constitutional:       Appearance: Normal appearance. He is not toxic-appearing or diaphoretic. HENT:      Head: Normocephalic and atraumatic. Nose: Nose normal.   Eyes:      General:         Right eye: No discharge. Left eye: No discharge. Cardiovascular:      Rate and Rhythm: Tachycardia present. Pulses: Normal pulses. Heart sounds: No murmur heard. Pulmonary:      Effort: Pulmonary effort is normal. No respiratory distress. Abdominal:      Palpations: Abdomen is soft. Tenderness: There is no abdominal tenderness. There is no guarding. Musculoskeletal:         General: Normal range of motion. Cervical back: Normal range of motion and neck supple. Skin:     General: Skin is warm and dry. Neurological:      General: No focal deficit present. Mental Status: He is alert and oriented to person, place, and time.    Psychiatric:         Mood and Affect: Mood normal.         Behavior: Behavior normal.         DIAGNOSTIC RESULTS   LABS:    Labs Reviewed   COVID-19 & INFLUENZA COMBO - Abnormal; Notable for the following components:       Result Value    SARS-CoV-2 RNA, RT PCR DETECTED (*)     All other components within normal limits    Narrative:     Buelah Median  [facility] tel. [phone],  Chemistry results called to and read back byryan putnam rn, 02/14/2022  22:10, by [facility]  Performed at:  [facility],  [place], [facility]   Phone [phone]   COMPREHENSIVE METABOLIC PANEL W/ REFLEX TO MG FOR LOW K - Abnormal; Notable for the following components:    Chloride 97 (*)     Glucose 153 (*)     CREATININE 0.7 (*)     Total Bilirubin 1.7 (*)     All other components within normal limits    Narrative:     Performed at:  [place] [address],  [place] [employer]   Phone [phone]   D-DIMER, QUANTITATIVE - Abnormal; Notable for the following components:    D-Dimer, Quant 373 (*)     All other components within normal limits    Narrative:     Performed at:  [place] [address],  [place] [facility]   Phone [phone]   CBC WITH AUTO DIFFERENTIAL - Abnormal; Notable for the following components:    Hemoglobin 13.0 (*)     Hematocrit 37.7 (*)     All other components within normal limits    Narrative:     Performed at:  [place] [address],  [place] [facility]   Phone [phone]   COMPREHENSIVE METABOLIC PANEL W/ REFLEX TO MG FOR LOW K - Abnormal; Notable for the following components:    Glucose 200 (*)     CREATININE 0.6 (*)     Albumin 3.2 (*)     Albumin/Globulin Ratio 1.0 (*)     All other components within normal limits    Narrative:     Performed at:  [facility],  [employer]   Phone [phone]   C-REACTIVE PROTEIN - Abnormal; Notable for the following components:    CRP 51.8 (*)     All other components within normal limits    Narrative:     Performed at:  [place] [address],  [employer]   Phone [phone]   COMPREHENSIVE METABOLIC PANEL W/ REFLEX TO MG FOR LOW K - Abnormal; Notable for the following components:    Glucose 182 (*)     CREATININE 0.6 (*)     Albumin 3.3 (*)     Albumin/Globulin Ratio 1.0 (*)     All other components within normal limits    Narrative:     Performed at:  [facility]  [address],  [place], [place]   Phone [phone]   C-REACTIVE PROTEIN - Abnormal; Notable for the following components:    CRP 36.1 (*)     All other components within normal limits    Narrative:     Performed at:  [facility] [address],  [place], [place]   Phone [phone]   CBC WITH AUTO DIFFERENTIAL - Abnormal; Notable for the following components:    Hemoglobin 13.2 (*)     Hematocrit 39.9 (*)     All other components within normal limits    Narrative:     Performed at:  [facility] [address],  [place], [place]   Phone [phone]   COMPREHENSIVE METABOLIC PANEL W/ REFLEX TO MG FOR LOW K - Abnormal; Notable for the following components:    Glucose 190 (*)     CREATININE 0.6 (*)     Albumin 3.3 (*)     Albumin/Globulin Ratio 1.0 (*)     Alkaline Phosphatase 39 (*)     All other components within normal limits    Narrative:     Performed at:  [place] [address],  [place], [place]   Phone [phone]   C-REACTIVE PROTEIN - Abnormal; Notable for the following components:    CRP 14.8 (*)     All other components within normal limits    Narrative:     Performed at:  [facility] [address],  [place], [place]   Phone [phone] - Abnormal; Notable for the following components:    Protime 14.0 (*)     INR 1.23 (*)     All other components within normal limits    Narrative:     Performed at:  [facility] [address],  [place], [place]   Phone [phone]   CBC WITH AUTO DIFFERENTIAL - Abnormal; Notable for the following components:    Hemoglobin 13.3 (*)     Hematocrit 39.3 (*)     Neutrophils Absolute 8.5 (*)     All other components within normal limits    Narrative:     Collection has been rescheduled by Cathie Kimble at 02/19/2022 04:15 Reason:   Patient has port or line  Performed at:  [facility] [address],  [place], [place]   Phone [phone]   COMPREHENSIVE METABOLIC PANEL W/ REFLEX TO MG FOR LOW K - Abnormal; Notable for the following components:    Glucose 183 (*)     CREATININE 0.6 (*)     ALT 52 (*)     All other components within normal limits    Narrative:     Collection has been rescheduled by Gerry De Jesus at 02/19/2022 04:15 Reason:   Patient has port or line  Performed at:  [facility] [address],  [employer], [place]   Phone [phone]   POCT GLUCOSE - Abnormal; Notable for the following components:    POC Glucose 196 (*)     All other components within normal limits    Narrative:     Performed at:  [place] [address],  [employer], [employer]   Phone [phone]   POCT GLUCOSE - Abnormal; Notable for the following components:    POC Glucose 193 (*)     All other components within normal limits    Narrative:     Performed at:  [place] [address],  [employer], [employer]   Phone [phone]   POCT GLUCOSE - Abnormal; Notable for the following components:    POC Glucose 137 (*)     All other components within normal limits    Narrative:     Performed at:  [facility]) [facility] [address],  [place], [employer]   Phone [phone]   POCT GLUCOSE - Abnormal; Notable for the following components:    POC Glucose 167 (*)     All other components within normal limits    Narrative:     Performed at:  [facility] [address],  [place], [employer]   Phone [phone]   POCT GLUCOSE - Abnormal; Notable for the following components:    POC Glucose 147 (*)     All other components within normal limits    Narrative:     Performed at:  [facility] [address],  [employer], [employer]   Phone [phone]   POCT GLUCOSE - Abnormal; Notable for the following components:    POC Glucose 109 (*)     All other components within normal limits    Narrative:     Performed at:  [facility] [address],  [place], [employer]   Phone [phone]   POCT GLUCOSE - Abnormal; Notable for the following components:    POC Glucose 132 (*)     All other components within normal limits    Narrative:     Performed at:  [facility] [address],  [place], [employer]   Phone [phone]   POCT GLUCOSE - Abnormal; Notable for the following components:    POC Glucose 137 (*)     All other components within normal limits    Narrative:     Performed at:  [facility] [address],  [place], [employer]   Phone [phone]   POCT GLUCOSE - Abnormal; Notable for the following components:    POC Glucose 145 (*)     All other components within normal limits    Narrative:     Performed at:  [facility] [address],  [place], [employer]   Phone [phone]   POCT GLUCOSE - Abnormal; Notable for the following components:    POC Glucose 127 (*)     All other components within normal limits    Narrative:     Performed at:  [facility] [address],  [place], [employer]   Phone [phone]   POCT GLUCOSE - Abnormal; Notable for the following components:    POC Glucose 134 (*)     All other components within normal limits    Narrative:     Performed at:  [facility] [address],  [place], [employer]   Phone [phone]   POCT GLUCOSE - Abnormal; Notable for the following components:    POC Glucose 161 (*)     All other components within normal limits    Narrative:     Performed at:  [facility] [address],  [place], [employer]   Phone [phone]   POCT GLUCOSE - Abnormal; Notable for the following components:    POC Glucose 106 (*)     All other components within normal limits    Narrative:     Performed at:  [facility] [address],  [place], [place] [employer]   Phone [phone]   POCT GLUCOSE - Abnormal; Notable for the following components:    POC Glucose 102 (*)     All other components within normal limits    Narrative:     Performed at:  [facility] [address],  [place], [place] [employer]   Phone [phone]   POCT GLUCOSE - Abnormal; Notable for the following components:    POC Glucose 153 (*)     All other components within normal limits    Narrative:     Performed at:  [place] [address],  [place], [place] [employer]   Phone [phone]   POCT GLUCOSE - Abnormal; Notable for the following components:    POC Glucose 162 (*)     All other components within normal limits    Narrative:     Performed at:  [facility] [address],  [place], [place] [employer]   Phone [phone]   CBC WITH AUTO DIFFERENTIAL    Narrative:     Performed at:  [place] [address],  [place], [employer]   Phone [phone]   TROPONIN    Narrative:     Performed at:  [place] [address],  [place], [place] [employer]   Phone [phone]   PROCALCITONIN    Narrative:     Performed at:  [facility] [address],  [place], [employer]   Phone [phone]   LACTIC ACID, PLASMA    Narrative:     Performed at:  [facility] [address],  [place], [place] [employer]   Phone [phone]   HEMOGLOBIN A1C    Narrative:     Performed at:  [facility]  [address] [place] [address]   Phone [phone]   CBC WITH AUTO DIFFERENTIAL    Narrative:     Performed at:  [facility],  [place], [facility]   Phone [phone]   POCT GLUCOSE   POCT GLUCOSE   POCT GLUCOSE   POCT GLUCOSE   POCT GLUCOSE   POCT GLUCOSE   POCT GLUCOSE   POCT GLUCOSE   POCT GLUCOSE   POCT GLUCOSE   POCT GLUCOSE   POCT GLUCOSE   POCT GLUCOSE   POCT GLUCOSE   POCT GLUCOSE   POCT GLUCOSE   POCT GLUCOSE   POCT GLUCOSE   POCT GLUCOSE       When ordered, only abnormal lab results are displayed. All other labs were within normal range or not returned as of this dictation. EKG: When ordered, EKG's are interpreted by the Emergency Department Physician in the absence of a cardiologist.  Please see their note for interpretation of EKG. RADIOLOGY:   Non-plain film images such as CT, Ultrasound and MRI are read by the radiologist. Plain radiographic images are visualized andpreliminarily interpreted by the  ED Provider with the below findings:        Interpretation perthe Radiologist below, if available at the time of this note:    XR CHEST PORTABLE   Final Result   1. Retraction of the right upper extremity PICC with the tip now in the lower   superior vena cava. 2. No significant change in subpleural, basilar predominant airspace   opacities consistent with COVID-19 pneumonia given patient history. 3. Pulmonary vascular congestion and mild cardiomegaly. XR CHEST PORTABLE   Final Result   PICC line noted likely within the 6 cm below the cavoatrial junction on the   AP view. It is conceivable this may be needs to be retracted up to 10 cm. Multifocal pneumonia compatible with COVID. Critical results discussed between Dr. Dain Holter and Dr Leonarda Conley on 2/18/2022 -            IR PICC WO SQ PORT/PUMP > 5 YEARS   Final Result   Successful placement of PICC line. CT CHEST PULMONARY EMBOLISM W CONTRAST   Final Result   No evidence of pulmonary embolism.       Scattered areas of patchy and ground-glass opacities are noted throughout the bilateral lungs. These are nonspecific but could indicate an atypical/viral   pneumonia, inclusive of COVID-19. XR CHEST 1 VIEW   Final Result   Scattered multifocal airspace opacities compatible with multifocal pneumonia. XR CHEST 1 VIEW    Result Date: 2/14/2022  EXAMINATION: ONE XRAY VIEW OF THE CHEST 2/14/2022 4:21 pm COMPARISON: None. HISTORY: ORDERING SYSTEM PROVIDED HISTORY: covid + cough TECHNOLOGIST PROVIDED HISTORY: Reason for exam:->covid + cough Reason for Exam: covid FINDINGS: Scattered multifocal airspace opacities. No pneumothorax or pleural effusion. The cardiomediastinal silhouette is normal.     Scattered multifocal airspace opacities compatible with multifocal pneumonia.          PROCEDURES   Unless otherwise noted below, none     Procedures    CRITICAL CARE TIME   N/A    CONSULTS:  PHARMACY TO DOSE MEDICATION  IP CONSULT TO PULMONOLOGY      EMERGENCY DEPARTMENT COURSE and DIFFERENTIAL DIAGNOSIS/MDM:   Vitals:    Vitals:    02/19/22 0700 02/19/22 0802 02/19/22 0900 02/19/22 1019   BP: 114/78 115/68 (!) 150/97 (!) 140/79   Pulse: 55 51 74 57   Resp: 19 16 20 17   Temp: 98 °F (36.7 °C)      TempSrc: Oral      SpO2: 91% 98% 95% 94%   Weight:       Height:           Patient was given thefollowing medications:  Medications   albuterol sulfate  (90 Base) MCG/ACT inhaler 2 puff (has no administration in time range)   glucose (GLUTOSE) 40 % oral gel 15 g (has no administration in time range)   glucagon (rDNA) injection 1 mg (has no administration in time range)   dextrose 5 % solution (has no administration in time range)   sodium chloride flush 0.9 % injection 5-40 mL ( IntraVENous Canceled Entry 2/19/22 0849)   sodium chloride flush 0.9 % injection 5-40 mL (has no administration in time range)   0.9 % sodium chloride infusion (has no administration in time range)   ondansetron (ZOFRAN-ODT) disintegrating tablet 4 mg (has no administration in time range)     Or   ondansetron [facility]) injection 4 mg (has no administration in time range)   polyethylene glycol (GLYCOLAX) packet 17 g (has no administration in time range)   acetaminophen (TYLENOL) tablet 650 mg (650 mg Oral Given 2/16/22 2022)     Or   acetaminophen (TYLENOL) suppository 650 mg ( Rectal See Alternative 2/16/22 2022)   guaiFENesin-dextromethorphan (ROBITUSSIN DM) 100-10 MG/5ML syrup 5 mL (5 mLs Oral Given 2/18/22 0254)   dexamethasone (PF) (DECADRON) injection 6 mg (6 mg IntraVENous Given 2/18/22 2011)   Vitamin D (CHOLECALCIFEROL) tablet 2,000 Units (2,000 Units Oral Given 2/19/22 0848)   ascorbic acid (VITAMIN C) tablet 500 mg (500 mg Oral Given 2/19/22 0848)   dextrose bolus (hypoglycemia) 10% 125 mL (has no administration in time range)     Or   dextrose bolus (hypoglycemia) 10% 250 mL (has no administration in time range)   albuterol sulfate  (90 Base) MCG/ACT inhaler 2 puff (2 puffs Inhalation Given 2/17/22 1856)     And   ipratropium (ATROVENT HFA) 17 MCG/ACT inhaler 2 puff (2 puffs Inhalation Given 2/17/22 1859)   valsartan (DIOVAN) tablet 320 mg (320 mg Oral Given 2/19/22 0848)   insulin lispro (HUMALOG) injection vial 0-12 Units (2 Units SubCUTAneous Given 2/19/22 0853)   insulin lispro (HUMALOG) injection vial 0-6 Units (1 Units SubCUTAneous Given 2/18/22 1937)   baricitinib (OLUMIANT) tablet 4 mg (4 mg Oral Given 2/19/22 0848)   HYDROcodone-homatropine (HYCODAN) 5-1.5 MG/5ML syrup 5 mL (5 mLs Oral Given 2/19/22 0026)   enoxaparin (LOVENOX) injection 40 mg (40 mg SubCUTAneous Given 2/19/22 0848)   mupirocin (BACTROBAN) 2 % ointment ( Nasal Given 2/19/22 0846)   benzonatate (TESSALON) capsule 200 mg (200 mg Oral Given 2/19/22 0848)   lidocaine PF 1 % injection 5 mL ( IntraDERmal Canceled Entry 2/18/22 6857)   sodium chloride flush 0.9 % injection 5-40 mL (10 mLs IntraVENous Given 2/19/22 1308)   sodium chloride flush 0.9 % injection 5-40 mL (has no administration in time range)   0.9 % sodium chloride infusion (has no administration in time range)   dexamethasone (PF) (DECADRON) injection 10 mg (10 mg IntraVENous Given 2/14/22 2121)   ipratropium-albuterol (DUONEB) nebulizer solution 1 ampule (1 ampule Inhalation Given 2/14/22 2110)   0.9 % sodium chloride bolus (0 mLs IntraVENous Stopped 2/14/22 2220)   ibuprofen (ADVIL;MOTRIN) tablet 600 mg (600 mg Oral Given 2/14/22 2119)   iopamidol (ISOVUE-370) 76 % injection 85 mL (85 mLs IntraVENous Given 2/15/22 0119)     ED Course as of 02/19/22 1110   Mon Feb 14, 2022   2045 COVID, hypoxia  unvaccinated [ER]   2204 No leukocytosis, anemia, thrombocytopenia. [ER]   2204 Lactate within normal limits. [ER]   2207 Procalcitonin within normal limits, low suspicion for bacterial superinfection. [ER]   2208 Troponin within normal limits [ER]   2222 Covid swab positive. Flu negative. [ER]   8645 No significant electrolyte abnormalities or evidence of kidney dysfunction. [ER]   2222 Liver function testing overall unremarkable. [ER]      ED Course User Index  [ER] Lala Tanner MD        Patient is noted to be hypoxic on room air. The patient does not saturation of 88% on room air. Patient is noted to have COVID-19 in Covid pneumonia. Also noted to have respiratory failure. The patient is awake and alert at this time. We are currently treating him with steroids and albuterol. At this time the patient's pro-Jarad is low and treat with antibiotics as likely not indicated as this is viral pneumonia. FINAL IMPRESSION      1. COVID-19    2. Acute respiratory failure with hypoxia [facility])          DISPOSITION/PLAN   DISPOSITION Admitted 02/15/2022 01:48:53 AM      PATIENT REFERREDTO:  No follow-up provider specified.     DISCHARGE MEDICATIONS:  Current Discharge Medication List          DISCONTINUED MEDICATIONS:  Current Discharge Medication List                 (Please note that portions ofthis note were completed with a voice recognition program.  Efforts were made to edit the dictations but occasionally words are mis-transcribed.)    YASMANI Martinez CNP (electronically signed)            YASMANI Martinez CNP  02/19/22 2949

## 2022-02-15 NOTE — CARE COORDINATION
Case Management Assessment  Initial Evaluation      Patient Name: Alaina Rodriguez  YOB: 1970  Diagnosis: Acute respiratory failure due to COVID-19 [facility]) [U07.1, J96.00]  Date / Time: 2/14/2022  8:31 PM    Admission status/Date:2/15/2022  Chart Reviewed: Yes      Patient Interviewed: Yes   Family Interviewed:  No      Hospitalization in the last 30 days:  No      Health Care Decision Maker :   Primary Decision Maker: MargothTiff cavanaugh - Spouse - [phone]    (CM - must 1st enter selection under Navigator - emergency contact- [place] Relationship and pick relationship)   Who do you trust or have selected to make healthcare decisions for you      Met with: pt  Interview conducted  (bedside/phone):from doorway    Current PCP:   Chadwick Araujo required for SNF : Y          3 night stay required -  [employer]  Support Systems/Care Needs:    Transportation: self    Meal Preparation: self    Housing  Living Arrangements:  Lives in house alone  Steps: [address] [facility] for return to present living arrangements: Yes  Identified Issues:     [address] with [address] : No Agency:(Services)     Passport/Waiver : No  :                      Phone Number:    Passport/Waiver Services:           Durable Medical Equiptment   DME Provider: none  Equipment: none  Walker___Cane___RTS___ BSC___Shower Chair___Hospital Bed___W/C____Other________  02 at ____Liter(s)---wears(frequency)_______ HHN ___ CPAP___ BiPap___   N/A____      Home O2 Use :  No    If No for home O2---if presently on O2 during hospitalization:  Yes  if yes CM to follow for potential DC O2 need    Community Service Affiliation  Dialysis:  No    · Agency:  · Location:  · Dialysis Schedule:  · Phone:   · Fax: Other Community Services:none     DISCHARGE PLAN: Explained Case Management role/services. Chart reviewed. C19+ on 2L.  Pt did not answer room phone, spoke with pt from the door. Pt is from house alone and plans to return. Pt is IPTA. +drives. Will follow for possible home O2 needs.

## 2022-02-15 NOTE — ED NOTES
Bed: 14  Expected date:   Expected time:   Means of arrival:   Comments:     Devon Nation RN  02/14/22 2059

## 2022-02-16 LAB
A/G RATIO: 1 (ref 1.1–2.2)
ALBUMIN SERPL-MCNC: 3.2 G/DL (ref 3.4–5)
ALP BLD-CCNC: 44 U/L (ref 40–129)
ALT SERPL-CCNC: 29 U/L (ref 10–40)
ANION GAP SERPL CALCULATED.3IONS-SCNC: 10 MMOL/L (ref 3–16)
AST SERPL-CCNC: 33 U/L (ref 15–37)
BASOPHILS ABSOLUTE: 0 K/UL (ref 0–0.2)
BASOPHILS RELATIVE PERCENT: 0.1 %
BILIRUB SERPL-MCNC: 0.4 MG/DL (ref 0–1)
BUN BLDV-MCNC: 16 MG/DL (ref 7–20)
C-REACTIVE PROTEIN: 51.8 MG/L (ref 0–5.1)
CALCIUM SERPL-MCNC: 8.4 MG/DL (ref 8.3–10.6)
CHLORIDE BLD-SCNC: 104 MMOL/L (ref 99–110)
CO2: 25 MMOL/L (ref 21–32)
CREAT SERPL-MCNC: 0.6 MG/DL (ref 0.9–1.3)
EOSINOPHILS ABSOLUTE: 0 K/UL (ref 0–0.6)
EOSINOPHILS RELATIVE PERCENT: 0 %
ESTIMATED AVERAGE GLUCOSE: 157.1 MG/DL
GFR AFRICAN AMERICAN: >60
GFR NON-AFRICAN AMERICAN: >60
GLUCOSE BLD-MCNC: 109 MG/DL (ref 70–99)
GLUCOSE BLD-MCNC: 132 MG/DL (ref 70–99)
GLUCOSE BLD-MCNC: 147 MG/DL (ref 70–99)
GLUCOSE BLD-MCNC: 167 MG/DL (ref 70–99)
GLUCOSE BLD-MCNC: 200 MG/DL (ref 70–99)
HBA1C MFR BLD: 7.1 %
HCT VFR BLD CALC: 37.7 % (ref 40.5–52.5)
HEMOGLOBIN: 13 G/DL (ref 13.5–17.5)
LYMPHOCYTES ABSOLUTE: 1.4 K/UL (ref 1–5.1)
LYMPHOCYTES RELATIVE PERCENT: 14.5 %
MCH RBC QN AUTO: 29.1 PG (ref 26–34)
MCHC RBC AUTO-ENTMCNC: 34.4 G/DL (ref 31–36)
MCV RBC AUTO: 84.8 FL (ref 80–100)
MONOCYTES ABSOLUTE: 0.5 K/UL (ref 0–1.3)
MONOCYTES RELATIVE PERCENT: 5 %
NEUTROPHILS ABSOLUTE: 7.6 K/UL (ref 1.7–7.7)
NEUTROPHILS RELATIVE PERCENT: 80.4 %
PDW BLD-RTO: 14.6 % (ref 12.4–15.4)
PERFORMED ON: ABNORMAL
PLATELET # BLD: 289 K/UL (ref 135–450)
PMV BLD AUTO: 8.5 FL (ref 5–10.5)
POTASSIUM REFLEX MAGNESIUM: 4.3 MMOL/L (ref 3.5–5.1)
RBC # BLD: 4.45 M/UL (ref 4.2–5.9)
SODIUM BLD-SCNC: 139 MMOL/L (ref 136–145)
TOTAL PROTEIN: 6.5 G/DL (ref 6.4–8.2)
WBC # BLD: 9.5 K/UL (ref 4–11)

## 2022-02-16 PROCEDURE — 2580000003 HC RX 258: Performed by: INTERNAL MEDICINE

## 2022-02-16 PROCEDURE — 6370000000 HC RX 637 (ALT 250 FOR IP): Performed by: INTERNAL MEDICINE

## 2022-02-16 PROCEDURE — 86140 C-REACTIVE PROTEIN: CPT

## 2022-02-16 PROCEDURE — 2700000000 HC OXYGEN THERAPY PER DAY

## 2022-02-16 PROCEDURE — 99222 1ST HOSP IP/OBS MODERATE 55: CPT

## 2022-02-16 PROCEDURE — 36415 COLL VENOUS BLD VENIPUNCTURE: CPT

## 2022-02-16 PROCEDURE — 94761 N-INVAS EAR/PLS OXIMETRY MLT: CPT

## 2022-02-16 PROCEDURE — 1200000000 HC SEMI PRIVATE

## 2022-02-16 PROCEDURE — 6360000002 HC RX W HCPCS: Performed by: INTERNAL MEDICINE

## 2022-02-16 PROCEDURE — 6370000000 HC RX 637 (ALT 250 FOR IP)

## 2022-02-16 PROCEDURE — 99223 1ST HOSP IP/OBS HIGH 75: CPT | Performed by: INTERNAL MEDICINE

## 2022-02-16 PROCEDURE — 80053 COMPREHEN METABOLIC PANEL: CPT

## 2022-02-16 PROCEDURE — XW0DXM6 INTRODUCTION OF BARICITINIB INTO MOUTH AND PHARYNX, EXTERNAL APPROACH, NEW TECHNOLOGY GROUP 6: ICD-10-PCS | Performed by: INTERNAL MEDICINE

## 2022-02-16 PROCEDURE — 85025 COMPLETE CBC W/AUTO DIFF WBC: CPT

## 2022-02-16 RX ORDER — ALBUTEROL SULFATE 90 UG/1
2 AEROSOL, METERED RESPIRATORY (INHALATION) EVERY 4 HOURS PRN
Status: DISCONTINUED | OUTPATIENT
Start: 2022-02-16 | End: 2022-02-22 | Stop reason: HOSPADM

## 2022-02-16 RX ORDER — BENZONATATE 100 MG/1
100 CAPSULE ORAL 3 TIMES DAILY PRN
Status: DISCONTINUED | OUTPATIENT
Start: 2022-02-16 | End: 2022-02-18

## 2022-02-16 RX ORDER — VALSARTAN 80 MG/1
320 TABLET ORAL DAILY
Status: DISCONTINUED | OUTPATIENT
Start: 2022-02-16 | End: 2022-02-22 | Stop reason: HOSPADM

## 2022-02-16 RX ADMIN — ACETAMINOPHEN 650 MG: 325 TABLET ORAL at 20:22

## 2022-02-16 RX ADMIN — VALSARTAN 320 MG: 80 TABLET, FILM COATED ORAL at 13:55

## 2022-02-16 RX ADMIN — INSULIN LISPRO 1 UNITS: 100 INJECTION, SOLUTION INTRAVENOUS; SUBCUTANEOUS at 08:51

## 2022-02-16 RX ADMIN — GUAIFENESIN AND DEXTROMETHORPHAN 5 ML: 100; 10 SYRUP ORAL at 08:50

## 2022-02-16 RX ADMIN — SODIUM CHLORIDE, PRESERVATIVE FREE 10 ML: 5 INJECTION INTRAVENOUS at 20:25

## 2022-02-16 RX ADMIN — BENZONATATE 100 MG: 100 CAPSULE ORAL at 20:37

## 2022-02-16 RX ADMIN — OXYCODONE HYDROCHLORIDE AND ACETAMINOPHEN 500 MG: 500 TABLET ORAL at 08:50

## 2022-02-16 RX ADMIN — BARICITINIB 4 MG: 2 TABLET, FILM COATED ORAL at 17:10

## 2022-02-16 RX ADMIN — GUAIFENESIN AND DEXTROMETHORPHAN 5 ML: 100; 10 SYRUP ORAL at 02:13

## 2022-02-16 RX ADMIN — ENOXAPARIN SODIUM 30 MG: 100 INJECTION SUBCUTANEOUS at 20:23

## 2022-02-16 RX ADMIN — BENZONATATE 100 MG: 100 CAPSULE ORAL at 12:00

## 2022-02-16 RX ADMIN — Medication 2000 UNITS: at 08:50

## 2022-02-16 RX ADMIN — DEXAMETHASONE SODIUM PHOSPHATE 6 MG: 10 INJECTION INTRAMUSCULAR; INTRAVENOUS at 20:24

## 2022-02-16 RX ADMIN — INSULIN LISPRO 1 UNITS: 100 INJECTION, SOLUTION INTRAVENOUS; SUBCUTANEOUS at 12:01

## 2022-02-16 RX ADMIN — ENOXAPARIN SODIUM 30 MG: 100 INJECTION SUBCUTANEOUS at 08:50

## 2022-02-16 ASSESSMENT — PAIN SCALES - GENERAL
PAINLEVEL_OUTOF10: 0

## 2022-02-16 NOTE — PROGRESS NOTES
Pt currently on CPAP with oxygen at 5 liters. SpO2 87% RT called and RN instructed to increase to 8 liters.  RN increased to 6 liters SpO2 93%

## 2022-02-16 NOTE — PROGRESS NOTES
BARICITINIB INITIATION per Ernst Doll    Parameters (must answer yes)  COVID + yes   Oxygen Requirement yes   Elevated Inflammatory Marker yes   Steroid Use yes     Parameters (must answer no)  Tocilizumab in last 14 days no   Pregnant no   Hepatic Impairment (severe) no   Chronic/Recurrent Infections no   Hemoglobin <8.0 g/dL no   Chronically immunosuppressed (via drug or disease) no   ANC <500 cells/mcL no   ALC <200 cells/mcL no     Dosing (for patients >5years of age): eGFR: >60: 4 mg once daily  eGFR: 30 to <60: 2 mg once daily  eGFR:  15 to <30: 1 mg once daily if potential benefit outweighs risk  eGFR: <15: not recommended  On dialysis, ESRD, or SUZY: not recommended    Oral:  Duration of baricitinib is 14 days or until hospital discharge, whichever is first.     Pharmacy will continue to monitor.     Rasheed Garcia Pharm D 2/16/20222:00 PM  .

## 2022-02-16 NOTE — PROGRESS NOTES
Consult has been called to Dr. Jade Diallo on 2/16/22. Spoke with neil.  1:35 PM    Linda Lopez  2/16/2022

## 2022-02-16 NOTE — PROGRESS NOTES
Bedside report given to [facility]  pt in stable condition no needs at this time.  Call light within reach

## 2022-02-16 NOTE — PROGRESS NOTES
Pts SpO2 dropped to 87% on 3 liters. Pt brought home CPAP machine and would like to wear it at this time.  RT called and will be in to place on pt since pt does not normally wear oxygen at home and needs it here

## 2022-02-16 NOTE — CONSULTS
Reason for referral and CC: SOB, COVID 19 pneumonia in an unvaccinated individual    HISTORY OF PRESENT ILLNESS: 45 with a history of diabetes presented with progressive shortness of breath and coughing fits. He first became sick about 10 days ago and he is on antibiotics at home. On presentation to the emergency room on Monday he tested positive for COVID-19 and had bilateral pneumonia on his CTPA. His oxygenation worsened and he was started on baricitinib yesterday. Today the patient has improved oxygen although he still complains of a cough. Febrile. Past Medical History:   Diagnosis Date    Cholesterol serum elevated     Depression     Diabetes mellitus ([address]     Hypertension     Reflux      Past Surgical History:   Procedure Laterality Date    ANTERIOR CRUCIATE LIGAMENT REPAIR      CARPAL TUNNEL RELEASE  11/15/10    right    CARPAL TUNNEL RELEASE  12/6/10    KNEE ARTHROSCOPY      TONSILLECTOMY      UMBILICAL HERNIA REPAIR N/A 12/22/14    LAPAROSCOPIC WITH MESH     Family History  family history includes Diabetes in his brother and father; Heart Disease in his brother; Hypertension in his father; Hyperthyroidism in his daughter; Danne Mio in his mother; Stroke in his father. Social History:  reports that he has never smoked. He has never used smokeless tobacco.   reports current alcohol use. ALLERGIES:  Patient is allergic to doxepin, no known allergies, venlafaxine, and wellbutrin [bupropion hcl].   Continuous Infusions:   dextrose      sodium chloride       Scheduled Meds:   valsartan  320 mg Oral Daily    insulin lispro  0-12 Units SubCUTAneous TID WC    insulin lispro  0-6 Units SubCUTAneous Nightly    sodium chloride flush  5-40 mL IntraVENous 2 times per day    enoxaparin  30 mg SubCUTAneous BID    dexamethasone  6 mg IntraVENous Q24H    Vitamin D  2,000 Units Oral Daily    ascorbic acid  500 mg Oral Daily     PRN Meds:  albuterol sulfate HFA **AND** ipratropium, benzonatate, albuterol sulfate HFA, glucose, glucagon (rDNA), dextrose, sodium chloride flush, sodium chloride, ondansetron **OR** ondansetron, polyethylene glycol, acetaminophen **OR** acetaminophen, guaiFENesin-dextromethorphan, dextrose bolus (hypoglycemia) **OR** dextrose bolus (hypoglycemia), HYDROcodone 5 mg - acetaminophen    REVIEW OF SYSTEMS:  Constitutional: + for fever  HENT: Negative for sore throat  Eyes: Negative for redness   Respiratory: + dyspnea, cough  Cardiovascular: Negative for chest pain  Gastrointestinal: Negative for vomiting, diarrhea   Genitourinary: Negative for hematuria   Musculoskeletal: Negative for arthralgias   Skin: Negative for rash  Neurological: Negative for syncope  Hematological: Negative for adenopathy  Psychiatric/Behavorial: Negative for anxiety    PHYSICAL EXAM: /72   Pulse 78   Temp 98.4 °F (36.9 °C) (Oral)   Resp 18   Ht 6' 4\" (1.93 m)   Wt (!) 330 lb 8 oz (149.9 kg)   SpO2 93%   BMI 40.23 kg/m²  on 3 L  Constitutional:  No acute distress. Eyes: PERRL. Conjunctivae anicteric. ENT: Normal nose. Normal tongue. Neck:  Trachea is midline. No thyroid tenderness. Respiratory: No accessory muscle usage. No decreased breath sounds. No wheezes. + posterior rales. No Rhonchi. Cardiovascular: Normal S1S2. No digit clubbing. No digit cyanosis. No LE edema. Capillary refill is normal.   Gastrointestinal: No mass palpated. No tenderness palpated. No umbilical hernia. Lymphatic: No cervical or supraclavicular adenopathy. Skin: No rash on the exposed extremities. No Nodules or induration on exposed extremities. Psychiatric: No anxiety or Agitation. Alert and Oriented to person, place and time.     CBC:   Recent Labs     02/14/22 2125 02/16/22  0536   WBC 8.3 9.5   HGB 13.8 13.0*   HCT 40.8 37.7*   MCV 83.9 84.8    289     BMP:   Recent Labs     02/14/22 2125 02/16/22  0536    139   K 3.8 4.3   CL 97* 104   CO2 28 25   BUN 14 16   CREATININE 0.7*

## 2022-02-16 NOTE — PLAN OF CARE
Problem: Airway Clearance - Ineffective  Goal: Achieve or maintain patent airway  2/15/2022 2228 by Alis Carter RN  Outcome: Ongoing  2/15/2022 1106 by Peace Schumacher RN  Outcome: Ongoing     Problem: Gas Exchange - Impaired  Goal: Absence of hypoxia  2/15/2022 2228 by Alis Carter RN  Outcome: Ongoing  2/15/2022 1106 by Peace Schumacher RN  Outcome: Ongoing  Goal: Promote optimal lung function  2/15/2022 1106 by Peace Schumacher RN  Outcome: Ongoing     Problem: Breathing Pattern - Ineffective  Goal: Ability to achieve and maintain a regular respiratory rate  2/15/2022 1106 by Peace Schumacher RN  Outcome: Ongoing     Problem:  Body Temperature -  Risk of, Imbalanced  Goal: Ability to maintain a body temperature within defined limits  2/15/2022 2228 by Alis Carter RN  Outcome: Ongoing  2/15/2022 1106 by Peace Schumacher RN  Outcome: Ongoing  Goal: Will regain or maintain usual level of consciousness  2/15/2022 1106 by Peace Schumacher RN  Outcome: Ongoing  Goal: Complications related to the disease process, condition or treatment will be avoided or minimized  2/15/2022 1106 by Peace Schumacher RN  Outcome: Ongoing     Problem: Isolation Precautions - Risk of Spread of Infection  Goal: Prevent transmission of infection  2/15/2022 1106 by Peace Schumacher RN  Outcome: Ongoing     Problem: Nutrition Deficits  Goal: Optimize nutritional status  2/15/2022 2228 by Alis Carter RN  Outcome: Ongoing  2/15/2022 1106 by Peace Schumacher RN  Outcome: Ongoing     Problem: Risk for Fluid Volume Deficit  Goal: Maintain normal heart rhythm  2/15/2022 1106 by Peace Schumacher RN  Outcome: Ongoing  Goal: Maintain absence of muscle cramping  2/15/2022 1106 by Peace Schumacher RN  Outcome: Ongoing  Goal: Maintain normal serum potassium, sodium, calcium, phosphorus, and pH  2/15/2022 1106 by Peace Schumacher RN  Outcome: Ongoing     Problem: Loneliness or Risk for Loneliness  Goal: Demonstrate positive use of time alone when socialization is not possible  2/15/2022 1106 by Cristian Bee Carmel Elliott RN  Outcome: Ongoing     Problem: Fatigue  Goal: Verbalize increase energy and improved vitality  2/15/2022 2228 by Halle Mack RN  Outcome: Ongoing  2/15/2022 1106 by Francesco Connelly RN  Outcome: Ongoing     Problem: Patient Education: Go to Patient Education Activity  Goal: Patient/Family Education  2/15/2022 1106 by Francesco Connelly RN  Outcome: Ongoing     Problem: Infection:  Goal: Will remain free from infection  Description: Will remain free from infection  2/15/2022 1106 by Francesco Connelly RN  Outcome: Ongoing     Problem: Safety:  Goal: Free from accidental physical injury  Description: Free from accidental physical injury  2/15/2022 1106 by Francesco Connelly RN  Outcome: Ongoing  Goal: Free from intentional harm  Description: Free from intentional harm  2/15/2022 1106 by Francesco Connelly RN  Outcome: Ongoing     Problem: Daily Care:  Goal: Daily care needs are met  Description: Daily care needs are met  2/15/2022 1106 by Francesco Connelly RN  Outcome: Ongoing     Problem: Pain:  Goal: Patient's pain/discomfort is manageable  Description: Patient's pain/discomfort is manageable  2/15/2022 1106 by Francesco Connelly RN  Outcome: Ongoing     Problem: Skin Integrity:  Goal: Skin integrity will stabilize  Description: Skin integrity will stabilize  2/15/2022 1106 by Francesco Connelly RN  Outcome: Ongoing     Problem: Discharge Planning:  Goal: Patients continuum of care needs are met  Description: Patients continuum of care needs are met  2/15/2022 1106 by Francesco Connelly RN  Outcome: Ongoing

## 2022-02-16 NOTE — PROGRESS NOTES
RT Inhaler-Nebulizer Bronchodilator Protocol Note    There is a bronchodilator order in the chart from a provider indicating to follow the RT Bronchodilator Protocol and there is an Initiate RT Inhaler-Nebulizer Bronchodilator Protocol order as well (see protocol at bottom of note). CXR Findings:  No results found. The findings from the last RT Protocol Assessment were as follows:   History Pulmonary Disease: None or smoker <15 pack years  Respiratory Pattern: Regular pattern and RR 12-20 bpm  Breath Sounds: Clear breath sounds  Cough: Strong, spontaneous, non-productive  Indication for Bronchodilator Therapy: None  Bronchodilator Assessment Score: 0    Aerosolized bronchodilator medication orders have been revised according to the RT Inhaler-Nebulizer Bronchodilator Protocol below. Respiratory Therapist to perform RT Therapy Protocol Assessment initially then follow the protocol. Repeat RT Therapy Protocol Assessment PRN for score 0-3 or on second treatment, BID, and PRN for scores above 3. No Indications - adjust the frequency to every 6 hours PRN wheezing or bronchospasm, if no treatments needed after 48 hours then discontinue using Per Protocol order mode. If indication present, adjust the RT bronchodilator orders based on the Bronchodilator Assessment Score as indicated below. Use Inhaler orders unless patient has one or more of the following: on home nebulizer, not able to hold breath for 10 seconds, is not alert and oriented, cannot activate and use MDI correctly, or respiratory rate 25 breaths per minute or more, then use the equivalent nebulizer order(s) with same Frequency and PRN reasons based on the score. If a patient is on this medication at home then do not decrease Frequency below that used at home.     0-3 - enter or revise RT bronchodilator order(s) to equivalent RT Bronchodilator order with Frequency of every 4 hours PRN for wheezing or increased work of breathing using Per Protocol order mode. 4-6 - enter or revise RT Bronchodilator order(s) to two equivalent RT bronchodilator orders with one order with BID Frequency and one order with Frequency of every 4 hours PRN wheezing or increased work of breathing using Per Protocol order mode. 7-10 - enter or revise RT Bronchodilator order(s) to two equivalent RT bronchodilator orders with one order with TID Frequency and one order with Frequency of every 4 hours PRN wheezing or increased work of breathing using Per Protocol order mode. 11-13 - enter or revise RT Bronchodilator order(s) to one equivalent RT bronchodilator order with QID Frequency and an Albuterol order with Frequency of every 4 hours PRN wheezing or increased work of breathing using Per Protocol order mode. Greater than 13 - enter or revise RT Bronchodilator order(s) to one equivalent RT bronchodilator order with every 4 hours Frequency and an Albuterol order with Frequency of every 2 hours PRN wheezing or increased work of breathing using Per Protocol order mode.        Electronically signed by Eduar Matson RCP on 2/16/2022 at 4:47 AM

## 2022-02-16 NOTE — PROGRESS NOTES
Progress Note    Admit Date:  2/14/2022    45 y.o. male with PMH below, presents with fatigue, myalgias, SOB, cough, COVID +, HA, decreased appetite/PO intake. PT reports COVID like Sx which started ~7d ago. He was Dx at his PCP's office 3 d ago but we are unable to verify this result. Tested positive in ED by PCR. He was started on Augmentin, alb INH, prednisone by his PCP 3 days ago. All of the above Sx are progressively worsening. Subjective:  Mr. Elias Patel remains fatigued. Continued non-productive cough that occurs quite frequently and affects O2 saturation. States he feels anxious and is having difficulty sleeping. Objective:   No data found. Intake/Output Summary (Last 24 hours) at 2/16/2022 1226  Last data filed at 2/16/2022 0904  Gross per 24 hour   Intake 630.17 ml   Output 700 ml   Net -69.83 ml       Physical Exam:    Gen: No apparent distress. Alert. Fatigued. Pleasant. Eyes: PERRL. No sclera icterus. No conjunctival injection. ENT: No discharge. Pharynx clear. Neck: Trachea midline. Resp: No accessory muscle use. + Bibasilar crackles. No wheezes. No rhonchi. CV: Regular rate. Regular rhythm. No murmur. No rub. No edema. Capillary Refill: Brisk,< 3 seconds   Peripheral Pulses: +2 palpable, equal bilaterally   GI: Non-tender. Non-distended. Normal bowel sounds. Skin: Warm and dry. No nodule on exposed extremities. No rash on exposed extremities. M/S: No cyanosis. No joint deformity. No clubbing. Neuro: Awake. Grossly nonfocal    Psych: Oriented x 3. + Mild anxiety. No agitation.        Scheduled Meds:   sodium chloride flush  5-40 mL IntraVENous 2 times per day    enoxaparin  30 mg SubCUTAneous BID    insulin lispro  0-6 Units SubCUTAneous TID WC    insulin lispro  0-3 Units SubCUTAneous Nightly    dexamethasone  6 mg IntraVENous Q24H    Vitamin D  2,000 Units Oral Daily    ascorbic acid  500 mg Oral Daily       Continuous Infusions:   dextrose      sodium chloride  sodium chloride 75 mL/hr at 02/16/22 0208       PRN Meds:  albuterol sulfate HFA **AND** ipratropium, benzonatate, albuterol sulfate HFA, glucose, glucagon (rDNA), dextrose, sodium chloride flush, sodium chloride, ondansetron **OR** ondansetron, polyethylene glycol, acetaminophen **OR** acetaminophen, guaiFENesin-dextromethorphan, dextrose bolus (hypoglycemia) **OR** dextrose bolus (hypoglycemia), HYDROcodone 5 mg - acetaminophen      Data:  CBC:   Recent Labs     02/14/22 2125 02/16/22  0536   WBC 8.3 9.5   HGB 13.8 13.0*   HCT 40.8 37.7*   MCV 83.9 84.8    289     BMP:   Recent Labs     02/14/22 2125 02/16/22  0536    139   K 3.8 4.3   CL 97* 104   CO2 28 25   BUN 14 16   CREATININE 0.7* 0.6*     LIVER PROFILE:   Recent Labs     02/14/22 2125 02/16/22  0536   AST 35 33   ALT 35 29   BILITOT 1.7* 0.4   ALKPHOS 47 44       CULTURES  Results for Tori Mishra (MRN [id number]) as of 2/16/2022 12:27   Ref. Range 2/14/2022 21:25   INFLUENZA A Latest Ref Range: NOT DETECTED  NOT DETECTED   INFLUENZA B Latest Ref Range: NOT DETECTED  NOT DETECTED   SARS-CoV-2 RNA, RT PCR Latest Ref Range: NOT DETECTED  DETECTED (A)        RADIOLOGY  CT CHEST PULMONARY EMBOLISM W CONTRAST   Final Result   No evidence of pulmonary embolism. Scattered areas of patchy and ground-glass opacities are noted throughout the   bilateral lungs. These are nonspecific but could indicate an atypical/viral   pneumonia, inclusive of COVID-19. XR CHEST 1 VIEW   Final Result   Scattered multifocal airspace opacities compatible with multifocal pneumonia.               Assessment/Plan:  #Acute respiratory failure with hypoxia    -Satting 88% on RA in the ER  -Currently requiring 3 L O2; satting in low 90s during my interview at rest  -Briefly required 5-7 L overnight  -No baseline home O2    #COVID-19 pneumonia  -Symptom onset 2/7  -Patient states he tested positive for COVID 2/11 at PCP appt; we do not have record of this  -COVID positive here on 2/14 by PCR   -Received 10 day course of doxycycline on 2/11 and completed 4 of 10 days with no improvement in sxs  -Droplet plus isolation  -Chest CT with bilateral GGO, CXR displays multifocal pna  -D-dimer, 373; CTA neg for PE  -PCT 0.11; hold off on abx for now  -CRP 51.8  -Febrile to 100.6 overnight; 98.3 now  -IBD  -Decadron 6 mg daily    -Pharm to dose baricitinib  -Pulm consult    #DM2  -Hold home meds   -Check Hgb A1c  -Med dose SSI  -Held off on Lantus as BGT in 150's and he is insulin naive  -CC diet     #HTN  -BP elevated  -Resumed home valsartan  -Monitor    #Morbid Obesity   -BMI is 06.7 kg/m²  -Complicating assessment and treatment.    -Placing patient at risk for multiple comorbidities as well as early death and contributing to the patients presentation.   -Patient would benefit from weight loss    #ADALI  -Home CPAP    DVT Prophylaxis: Lovenox  Diet: ADULT DIET;  Regular; 4 carb choices (60 gm/meal)  Code Status: Full Code    Steve Haynes PA-C  2/16/2022 12:45 PM

## 2022-02-16 NOTE — PLAN OF CARE
Education  Outcome: Ongoing     Problem: Infection:  Goal: Will remain free from infection  Description: Will remain free from infection  Outcome: Ongoing     Problem: Safety:  Goal: Free from accidental physical injury  Description: Free from accidental physical injury  Outcome: Ongoing  Goal: Free from intentional harm  Description: Free from intentional harm  Outcome: Ongoing     Problem: Daily Care:  Goal: Daily care needs are met  Description: Daily care needs are met  Outcome: Ongoing     Problem: Pain:  Goal: Patient's pain/discomfort is manageable  Description: Patient's pain/discomfort is manageable  Outcome: Ongoing     Problem: Skin Integrity:  Goal: Skin integrity will stabilize  Description: Skin integrity will stabilize  Outcome: Ongoing     Problem: Discharge Planning:  Goal: Patients continuum of care needs are met  Description: Patients continuum of care needs are met  Outcome: Ongoing

## 2022-02-16 NOTE — PROGRESS NOTES
Pt A/O admission questions completed. Pt low fall risk but d/t feeling weak and SOB with minimal activity pt agrees to use urinal and call out for any assistance. IVF infusing meds given per [facility]. PRN Tylenol given for temp 100.6. Pt provided with cool wash cloth. Pt denies any needs at this time.  Call light within reach

## 2022-02-16 NOTE — PROGRESS NOTES
AM assessment complete. See flowsheets. Gave am meds due see mar. PRN cough med given. A/O x 4. Ice water given. Bed locked/lowest position. Low fall risk/compliant with call light. Call light within reach.

## 2022-02-16 NOTE — PROGRESS NOTES
Patient admitted to room _220___ from ER. Patient oriented to room, call light, bed rails, phone, lights and bathroom. Patient instructed about the schedule of the day including: vital sign frequency, lab draws, possible tests, frequency of MD and staff rounds, daily weights, I &O's and prescribed diet. Bed alarm deferred patient low fall risk. Telemetry box in place, patient aware of placement and reason. Bed locked, in lowest position, side rails up 2/4, call light within reach. Recliner Assessment:     Patient is not able to demonstrate the ability to move from a reclining position to an upright position within the recliner due to weakness and SOB. 4 Eyes Skin Assessment     The patient is being assess for   Admission    I agree that 2 RN's have performed a thorough Head to Toe Skin Assessment on the patient. ALL assessment sites listed below have been assessed. Areas assessed for pressure by both nurses:   [x]   Head, Face, and Ears   [x]   Shoulders, Back, and Chest, Abdomen  [x]   Arms, Elbows, and Hands   [x]   Coccyx, Sacrum, and Ischium  [x]   Legs, Feet, and Heels        Skin Assessed Under all Medical Devices by both nurses:  O2 device tubing              All Mepilex Borders were peeled back and area peeked at by both nurses:  N/A  Please list where Mepilex Borders are located:  N/A             **SHARE this note so that the co-signing nurse is able to place an eSignature**    Co-signer eSignature: Electronically signed by Dereck Paul RN on 2/16/22 at 2:23 AM EST    Does the Patient have Skin Breakdown related to pressure?   No     (Insert Photo here N/A)         Srikanth Prevention initiated:  No   Wound Care Orders initiated:  No      [facility] nurse consulted for Pressure Injury (Stage 3,4, Unstageable, DTI, NWPT, Complex wounds)and New or Established Ostomies:  No      Primary Nurse eSignature: Electronically signed by Jose Knutson RN on 2/15/22 at 10:09 PM EST

## 2022-02-16 NOTE — FLOWSHEET NOTE
02/16/22 0201   Vital Signs   Temp 99.5 °F (37.5 °C)   Temp Source Axillary   Pulse 79   Heart Rate Source Monitor   Resp 18   /82   BP Location Right upper arm   Patient Position Semi fowlers   Level of Consciousness Alert (0)   MEWS Score 1   Oxygen Therapy   SpO2 97 %   O2 Device PAP (positive airway pressure)   O2 Flow Rate (L/min) 3 L/min   Pt awake at this time. PRN Robitussin given. Pt states not getting much sleep educated pt on possibly getting him something for tomorrow night d/t it being 2 am and we do not administer after 2 am. Pt takes Melatonin at home will pass to dayshift to get this ordered. Pt back on CPAP at this time. Pt denies any other needs.  Call light within reach

## 2022-02-17 LAB
A/G RATIO: 1 (ref 1.1–2.2)
ALBUMIN SERPL-MCNC: 3.3 G/DL (ref 3.4–5)
ALP BLD-CCNC: 42 U/L (ref 40–129)
ALT SERPL-CCNC: 31 U/L (ref 10–40)
ANION GAP SERPL CALCULATED.3IONS-SCNC: 14 MMOL/L (ref 3–16)
AST SERPL-CCNC: 31 U/L (ref 15–37)
BASOPHILS ABSOLUTE: 0 K/UL (ref 0–0.2)
BASOPHILS RELATIVE PERCENT: 0.1 %
BILIRUB SERPL-MCNC: 0.4 MG/DL (ref 0–1)
BUN BLDV-MCNC: 17 MG/DL (ref 7–20)
C-REACTIVE PROTEIN: 36.1 MG/L (ref 0–5.1)
CALCIUM SERPL-MCNC: 8.8 MG/DL (ref 8.3–10.6)
CHLORIDE BLD-SCNC: 104 MMOL/L (ref 99–110)
CO2: 22 MMOL/L (ref 21–32)
CREAT SERPL-MCNC: 0.6 MG/DL (ref 0.9–1.3)
EOSINOPHILS ABSOLUTE: 0 K/UL (ref 0–0.6)
EOSINOPHILS RELATIVE PERCENT: 0 %
GFR AFRICAN AMERICAN: >60
GFR NON-AFRICAN AMERICAN: >60
GLUCOSE BLD-MCNC: 127 MG/DL (ref 70–99)
GLUCOSE BLD-MCNC: 134 MG/DL (ref 70–99)
GLUCOSE BLD-MCNC: 137 MG/DL (ref 70–99)
GLUCOSE BLD-MCNC: 145 MG/DL (ref 70–99)
GLUCOSE BLD-MCNC: 182 MG/DL (ref 70–99)
HCT VFR BLD CALC: 41.6 % (ref 40.5–52.5)
HEMOGLOBIN: 13.6 G/DL (ref 13.5–17.5)
LYMPHOCYTES ABSOLUTE: 2.5 K/UL (ref 1–5.1)
LYMPHOCYTES RELATIVE PERCENT: 24.2 %
MCH RBC QN AUTO: 27.9 PG (ref 26–34)
MCHC RBC AUTO-ENTMCNC: 32.6 G/DL (ref 31–36)
MCV RBC AUTO: 85.7 FL (ref 80–100)
MONOCYTES ABSOLUTE: 0.6 K/UL (ref 0–1.3)
MONOCYTES RELATIVE PERCENT: 5.8 %
NEUTROPHILS ABSOLUTE: 7.3 K/UL (ref 1.7–7.7)
NEUTROPHILS RELATIVE PERCENT: 69.9 %
PDW BLD-RTO: 14.5 % (ref 12.4–15.4)
PERFORMED ON: ABNORMAL
PLATELET # BLD: 306 K/UL (ref 135–450)
PMV BLD AUTO: 7.8 FL (ref 5–10.5)
POTASSIUM REFLEX MAGNESIUM: 4.5 MMOL/L (ref 3.5–5.1)
RBC # BLD: 4.86 M/UL (ref 4.2–5.9)
SODIUM BLD-SCNC: 140 MMOL/L (ref 136–145)
TOTAL PROTEIN: 6.7 G/DL (ref 6.4–8.2)
WBC # BLD: 10.4 K/UL (ref 4–11)

## 2022-02-17 PROCEDURE — 36415 COLL VENOUS BLD VENIPUNCTURE: CPT

## 2022-02-17 PROCEDURE — 2000000000 HC ICU R&B

## 2022-02-17 PROCEDURE — 99233 SBSQ HOSP IP/OBS HIGH 50: CPT | Performed by: INTERNAL MEDICINE

## 2022-02-17 PROCEDURE — 2580000003 HC RX 258: Performed by: INTERNAL MEDICINE

## 2022-02-17 PROCEDURE — 80053 COMPREHEN METABOLIC PANEL: CPT

## 2022-02-17 PROCEDURE — 6370000000 HC RX 637 (ALT 250 FOR IP)

## 2022-02-17 PROCEDURE — 6370000000 HC RX 637 (ALT 250 FOR IP): Performed by: INTERNAL MEDICINE

## 2022-02-17 PROCEDURE — 86140 C-REACTIVE PROTEIN: CPT

## 2022-02-17 PROCEDURE — 85025 COMPLETE CBC W/AUTO DIFF WBC: CPT

## 2022-02-17 PROCEDURE — 94660 CPAP INITIATION&MGMT: CPT

## 2022-02-17 PROCEDURE — 6360000002 HC RX W HCPCS: Performed by: INTERNAL MEDICINE

## 2022-02-17 PROCEDURE — 2700000000 HC OXYGEN THERAPY PER DAY

## 2022-02-17 PROCEDURE — 94640 AIRWAY INHALATION TREATMENT: CPT

## 2022-02-17 PROCEDURE — 94761 N-INVAS EAR/PLS OXIMETRY MLT: CPT

## 2022-02-17 RX ADMIN — BARICITINIB 4 MG: 2 TABLET, FILM COATED ORAL at 08:44

## 2022-02-17 RX ADMIN — OXYCODONE HYDROCHLORIDE AND ACETAMINOPHEN 500 MG: 500 TABLET ORAL at 08:44

## 2022-02-17 RX ADMIN — BENZONATATE 100 MG: 100 CAPSULE ORAL at 06:37

## 2022-02-17 RX ADMIN — Medication 2 PUFF: at 18:56

## 2022-02-17 RX ADMIN — Medication 2 PUFF: at 18:59

## 2022-02-17 RX ADMIN — SODIUM CHLORIDE, PRESERVATIVE FREE 10 ML: 5 INJECTION INTRAVENOUS at 08:45

## 2022-02-17 RX ADMIN — HYDROCODONE BITARTRATE AND HOMATROPINE METHYLBROMIDE 5 ML: 5; 1.5 SOLUTION ORAL at 22:49

## 2022-02-17 RX ADMIN — VALSARTAN 320 MG: 80 TABLET, FILM COATED ORAL at 08:44

## 2022-02-17 RX ADMIN — ENOXAPARIN SODIUM 30 MG: 100 INJECTION SUBCUTANEOUS at 08:44

## 2022-02-17 RX ADMIN — HYDROCODONE BITARTRATE AND HOMATROPINE METHYLBROMIDE 5 ML: 5; 1.5 SOLUTION ORAL at 09:49

## 2022-02-17 RX ADMIN — SODIUM CHLORIDE, PRESERVATIVE FREE 10 ML: 5 INJECTION INTRAVENOUS at 21:00

## 2022-02-17 RX ADMIN — Medication 2000 UNITS: at 08:44

## 2022-02-17 RX ADMIN — DEXAMETHASONE SODIUM PHOSPHATE 6 MG: 10 INJECTION INTRAMUSCULAR; INTRAVENOUS at 20:00

## 2022-02-17 RX ADMIN — GUAIFENESIN AND DEXTROMETHORPHAN 5 ML: 100; 10 SYRUP ORAL at 08:44

## 2022-02-17 RX ADMIN — ENOXAPARIN SODIUM 30 MG: 100 INJECTION SUBCUTANEOUS at 20:00

## 2022-02-17 NOTE — PLAN OF CARE
Problem: Airway Clearance - Ineffective  Goal: Achieve or maintain patent airway  2/17/2022 0858 by Trevin Lassiter RN  Outcome: Ongoing  2/16/2022 2037 by Kathy Britton RN  Outcome: Ongoing     Problem: Gas Exchange - Impaired  Goal: Absence of hypoxia  2/17/2022 0858 by rTevin Lassiter RN  Outcome: Ongoing  2/16/2022 2037 by Kathy Britton RN  Outcome: Ongoing  Goal: Promote optimal lung function  2/17/2022 0858 by Trevin Lassiter RN  Outcome: Ongoing  2/16/2022 2037 by Kathy Britton RN  Outcome: Ongoing     Problem: Breathing Pattern - Ineffective  Goal: Ability to achieve and maintain a regular respiratory rate  2/17/2022 0858 by Trevin Lassiter RN  Outcome: Ongoing  2/16/2022 2037 by Kathy Britton RN  Outcome: Ongoing     Problem:  Body Temperature -  Risk of, Imbalanced  Goal: Ability to maintain a body temperature within defined limits  2/17/2022 0858 by Trevin Lassiter RN  Outcome: Ongoing  2/16/2022 2037 by Kathy Britton RN  Outcome: Ongoing  Goal: Will regain or maintain usual level of consciousness  2/17/2022 0858 by Trevin Lassiter RN  Outcome: Ongoing  2/16/2022 2037 by Kathy Britton RN  Outcome: Ongoing  Goal: Complications related to the disease process, condition or treatment will be avoided or minimized  2/17/2022 0858 by Trevin Lassiter RN  Outcome: Ongoing  2/16/2022 2037 by Kathy Britton RN  Outcome: Ongoing     Problem: Isolation Precautions - Risk of Spread of Infection  Goal: Prevent transmission of infection  2/17/2022 0858 by Trevin Lassiter RN  Outcome: Ongoing  2/16/2022 2037 by Kathy Britton RN  Outcome: Ongoing     Problem: Nutrition Deficits  Goal: Optimize nutritional status  2/17/2022 0858 by Trevin Lassiter RN  Outcome: Ongoing  2/16/2022 2037 by Kathy Britton RN  Outcome: Ongoing     Problem: Risk for Fluid Volume Deficit  Goal: Maintain normal heart rhythm  2/17/2022 0858 by Trevin Lassiter RN  Outcome: Ongoing  2/16/2022 2037 by Kathy Britton, RN  Outcome: Ongoing  Goal: Maintain absence of muscle cramping  2/17/2022 0858 by Trevin Lassiter RN  Outcome: Ongoing  2/16/2022 2037 by Kathy Britton RN  Outcome: Ongoing  Goal: Maintain normal serum potassium, sodium, calcium, phosphorus, and pH  2/17/2022 0858 by Trevin Lassiter RN  Outcome: Ongoing  2/16/2022 2037 by Kathy Britton RN  Outcome: Ongoing     Problem: Loneliness or Risk for Loneliness  Goal: Demonstrate positive use of time alone when socialization is not possible  2/17/2022 0858 by Trevin Lassiter RN  Outcome: Ongoing  2/16/2022 2037 by Kathy Britton RN  Outcome: Ongoing     Problem: Fatigue  Goal: Verbalize increase energy and improved vitality  2/17/2022 0858 by Trevin Lassiter RN  Outcome: Ongoing  2/16/2022 2037 by Kathy Britton RN  Outcome: Ongoing     Problem: Patient Education: Go to Patient Education Activity  Goal: Patient/Family Education  2/17/2022 0858 by Trevin Lassiter RN  Outcome: Ongoing  2/16/2022 2037 by Kathy Britton RN  Outcome: Ongoing     Problem: Infection:  Goal: Will remain free from infection  Description: Will remain free from infection  2/17/2022 0858 by Trevin Lassiter RN  Outcome: Ongoing  2/16/2022 2037 by Kathy Britton RN  Outcome: Ongoing     Problem: Safety:  Goal: Free from accidental physical injury  Description: Free from accidental physical injury  2/17/2022 0858 by Trevin Lassiter RN  Outcome: Ongoing  2/16/2022 2037 by Kathy Britton RN  Outcome: Ongoing  Goal: Free from intentional harm  Description: Free from intentional harm  2/17/2022 0858 by Trevin Lassiter RN  Outcome: Ongoing  2/16/2022 2037 by Kathy Britton RN  Outcome: Ongoing     Problem: Daily Care:  Goal: Daily care needs are met  Description: Daily care needs are met  2/17/2022 0858 by Trevin Lassiter RN  Outcome: Ongoing  2/16/2022 2037 by Brendolyn Reba, RN  Outcome: Ongoing     Problem: Pain:  Goal: Patient's pain/discomfort is manageable  Description: Patient's pain/discomfort is manageable  2/17/2022 0858 by Cecile Powell RN  Outcome: Ongoing  2/16/2022 2037 by Izetta Sandifer, RN  Outcome: Ongoing     Problem: Skin Integrity:  Goal: Skin integrity will stabilize  Description: Skin integrity will stabilize  2/17/2022 0858 by Cecile Powell RN  Outcome: Ongoing  2/16/2022 2037 by Izetta Sandifer, RN  Outcome: Ongoing     Problem: Discharge Planning:  Goal: Patients continuum of care needs are met  Description: Patients continuum of care needs are met  2/17/2022 0858 by Cecile Powell RN  Outcome: Ongoing  2/16/2022 2037 by Izetta Sandifer, RN  Outcome: Ongoing

## 2022-02-17 NOTE — PROGRESS NOTES
Pulmonary Progress Note    CC: SOB, COVID 19 pneumonia in an unvaccinated individual    Subjective:  Fever 101.3. C/o cough. Worse hypoxia today. EXAM: /87   Pulse 70   Temp 97 °F (36.1 °C) (Oral)   Resp 20   Ht 6' 4\" (1.93 m)   Wt (!) 330 lb 8 oz (149.9 kg)   SpO2 90%   BMI 40.23 kg/m²  on 8L  Constitutional:  No acute distress. Eyes: PERRL. Conjunctivae anicteric. ENT: Normal nose. Normal tongue. Neck:  Trachea is midline. No thyroid tenderness. Respiratory: No accessory muscle usage. Decreased breath sounds. No wheezes. No rales. No Rhonchi. Cardiovascular: Normal S1S2. No digit clubbing. No digit cyanosis. No LE edema. Psychiatric: No anxiety or Agitation. Alert and Oriented to person, place and time.     Scheduled Meds:   valsartan  320 mg Oral Daily    insulin lispro  0-12 Units SubCUTAneous TID WC    insulin lispro  0-6 Units SubCUTAneous Nightly    baricitinib  4 mg Oral Daily    sodium chloride flush  5-40 mL IntraVENous 2 times per day    enoxaparin  30 mg SubCUTAneous BID    dexamethasone  6 mg IntraVENous Q24H    Vitamin D  2,000 Units Oral Daily    ascorbic acid  500 mg Oral Daily     Continuous Infusions:   dextrose      sodium chloride       PRN Meds:  albuterol sulfate HFA **AND** ipratropium, benzonatate, albuterol sulfate HFA, glucose, glucagon (rDNA), dextrose, sodium chloride flush, sodium chloride, ondansetron **OR** ondansetron, polyethylene glycol, acetaminophen **OR** acetaminophen, guaiFENesin-dextromethorphan, dextrose bolus (hypoglycemia) **OR** dextrose bolus (hypoglycemia), HYDROcodone 5 mg - acetaminophen    Labs:  CBC:   Recent Labs     02/14/22 2125 02/16/22  0536 02/17/22  0630   WBC 8.3 9.5 10.4   HGB 13.8 13.0* 13.6   HCT 40.8 37.7* 41.6   MCV 83.9 84.8 85.7    289 306     BMP:   Recent Labs     02/14/22 2125 02/16/22  0536 02/17/22  0630    139 140   K 3.8 4.3 4.5   CL 97* 104 104   CO2 28 25 22   BUN 14 16 17   CREATININE 0.7* 0.6* 0.6*       Cultures:  2/14/22 COVID 19 detected  PCT 0.11     Chest imaging was reviewed by me and showed 2/15/22 CTPA:   No evidence of pulmonary embolism.       Scattered areas of patchy and ground-glass opacities are noted throughout the   bilateral lungs.  These are nonspecific but could indicate an atypical/viral   pneumonia, inclusive of COVID-19.         ASSESSMENT:  · COVID 19 pneumonia in an unvaccinated individual  · Acute hypoxic respiratory failure  · Cough  · DM2  · HTN  · Morbid Obesity  · ADALI on CPAP 14     PLAN:  · Droplet Plus Airborne Precautions   · Supplemental oxygen to keep saturation greater than 92%  · Decadron 6 mg QD, D#3, monitor glucose closely  · Baricitinib D#2  · Lovenox twice daily  · Home CPAP QHS  · Hycodan qhs prn

## 2022-02-17 NOTE — PLAN OF CARE
Problem: Airway Clearance - Ineffective  Goal: Achieve or maintain patent airway  2/16/2022 2037 by Ravindra Love RN  Outcome: Ongoing  2/16/2022 0856 by Mona Harrison RN  Outcome: Ongoing     Problem: Gas Exchange - Impaired  Goal: Absence of hypoxia  2/16/2022 2037 by Ravindra Love RN  Outcome: Ongoing  2/16/2022 0856 by Mona Harrison RN  Outcome: Ongoing  Goal: Promote optimal lung function  2/16/2022 2037 by Ravindra Love RN  Outcome: Ongoing  2/16/2022 0856 by Mona Harrison RN  Outcome: Ongoing     Problem: Breathing Pattern - Ineffective  Goal: Ability to achieve and maintain a regular respiratory rate  2/16/2022 2037 by Ravindra Love RN  Outcome: Ongoing  2/16/2022 0856 by Mona Harrison RN  Outcome: Ongoing     Problem:  Body Temperature -  Risk of, Imbalanced  Goal: Ability to maintain a body temperature within defined limits  2/16/2022 2037 by Ravindra Love RN  Outcome: Ongoing  2/16/2022 0856 by Mona Harrison RN  Outcome: Ongoing  Goal: Will regain or maintain usual level of consciousness  2/16/2022 2037 by Ravindra Love RN  Outcome: Ongoing  2/16/2022 0856 by Mona Harrison RN  Outcome: Ongoing  Goal: Complications related to the disease process, condition or treatment will be avoided or minimized  2/16/2022 2037 by Ravindra Love RN  Outcome: Ongoing  2/16/2022 0856 by Mona Harrison RN  Outcome: Ongoing     Problem: Isolation Precautions - Risk of Spread of Infection  Goal: Prevent transmission of infection  2/16/2022 2037 by Ravindra Love RN  Outcome: Ongoing  2/16/2022 0856 by Mona Harrison RN  Outcome: Ongoing     Problem: Nutrition Deficits  Goal: Optimize nutritional status  2/16/2022 2037 by Ravindra Love RN  Outcome: Ongoing  2/16/2022 0856 by Mona Harrison RN  Outcome: Ongoing     Problem: Risk for Fluid Volume Deficit  Goal: Maintain normal heart rhythm  2/16/2022 2037 by Ravindra Love RN  Outcome: Ongoing  2/16/2022 0856 by Mona Harrison RN  Outcome: Ongoing  Goal: Maintain absence of muscle cramping  2/16/2022 2037 by Rocio Ken RN  Outcome: Ongoing  2/16/2022 0856 by Richard Castellanos RN  Outcome: Ongoing  Goal: Maintain normal serum potassium, sodium, calcium, phosphorus, and pH  2/16/2022 2037 by Rocio Ken RN  Outcome: Ongoing  2/16/2022 0856 by Richard Castellanos RN  Outcome: Ongoing     Problem: Loneliness or Risk for Loneliness  Goal: Demonstrate positive use of time alone when socialization is not possible  2/16/2022 2037 by Rocio Ken RN  Outcome: Ongoing  2/16/2022 0856 by Richard Castellanos RN  Outcome: Ongoing     Problem: Fatigue  Goal: Verbalize increase energy and improved vitality  2/16/2022 2037 by Rocio Ken RN  Outcome: Ongoing  2/16/2022 0856 by Richard Castellanos RN  Outcome: Ongoing     Problem: Patient Education: Go to Patient Education Activity  Goal: Patient/Family Education  2/16/2022 2037 by Rocio Ken RN  Outcome: Ongoing  2/16/2022 0856 by Richard Castellanos RN  Outcome: Ongoing     Problem: Infection:  Goal: Will remain free from infection  Description: Will remain free from infection  2/16/2022 2037 by Rocio Ken RN  Outcome: Ongoing  2/16/2022 0856 by Richard Castellanos RN  Outcome: Ongoing     Problem: Safety:  Goal: Free from accidental physical injury  Description: Free from accidental physical injury  2/16/2022 2037 by Rocio Ken RN  Outcome: Ongoing  2/16/2022 0856 by Richard Castellanos RN  Outcome: Ongoing  Goal: Free from intentional harm  Description: Free from intentional harm  2/16/2022 2037 by Rocio Ken RN  Outcome: Ongoing  2/16/2022 0856 by Richard Castellanos RN  Outcome: Ongoing     Problem: Daily Care:  Goal: Daily care needs are met  Description: Daily care needs are met  2/16/2022 2037 by Rocio Ken RN  Outcome: Ongoing  2/16/2022 0856 by Richard Castellanos RN  Outcome: Ongoing     Problem: Pain:  Goal: Patient's pain/discomfort is manageable  Description: Patient's pain/discomfort is manageable  2/16/2022 2037 by Alejandra Xiong RN  Outcome: Ongoing  2/16/2022 0856 by Nohelia Alejandra RN  Outcome: Ongoing     Problem: Skin Integrity:  Goal: Skin integrity will stabilize  Description: Skin integrity will stabilize  2/16/2022 2037 by Alejandra Xiong RN  Outcome: Ongoing  2/16/2022 0856 by Nohelia Alejandra RN  Outcome: Ongoing     Problem: Discharge Planning:  Goal: Patients continuum of care needs are met  Description: Patients continuum of care needs are met  2/16/2022 2037 by Alejandra Xiong RN  Outcome: Ongoing  2/16/2022 0856 by Nohelia Alejandra RN  Outcome: Ongoing

## 2022-02-17 NOTE — PROGRESS NOTES
Handoff report given and pt care transferred to Ji COTTRELL. Pt denies needs at this time. Call light within reach.

## 2022-02-17 NOTE — PROGRESS NOTES
Progress Note    Admit Date:  2/14/2022    45 y.o. male with PMH below, presents with fatigue, myalgias, SOB, cough, COVID +, HA, decreased appetite/PO intake. PT reports COVID like Sx which started ~7d ago. He was Dx at his PCP's office 3 d ago but we are unable to verify this result. Tested positive in ED by PCR. He was started on Augmentin, alb INH, prednisone by his PCP 3 days ago. All of the above Sx are progressively worsening. 2/17   Patient with worsening hypoxemia. He was on oxygen 3 L yesterday. Overnight O2 requirement up to 12 L. O2 sats today 88% on 12 L. O2 requirement up to 15 L currently    Subjective:  Mr. Darryle Falco remains fatigued. Denies shortness of breath. Has a persistent cough. Desaturating easily with minimal activity or with cough. .  O2 sats currently 91% on 15 L of oxygen. Poor air entry on exam. I spoke to patient's nurse spoke to patient and patient's wife at bedside. .    Patient needs transfer to ICU level of care and will need to be initiated on Vapotherm. Objective:   Patient Vitals for the past 4 hrs:   BP Temp Temp src Pulse Resp SpO2   02/17/22 1230 -- -- -- -- -- 91 %   02/17/22 1056 115/72 98.2 °F (36.8 °C) Oral 65 18 92 %   02/17/22 0945 -- -- -- -- -- 92 %   02/17/22 0930 -- -- -- -- -- 95 %   02/17/22 0856 -- -- -- -- -- 91 %            Intake/Output Summary (Last 24 hours) at 2/17/2022 1255  Last data filed at 2/17/2022 1004  Gross per 24 hour   Intake 250 ml   Output 2250 ml   Net -2000 ml       Physical Exam:  Patient in droplet plus precautions  Gen: He appears ill and fatigued . Awake alert and well-oriented. Eyes: PERRL. No sclera icterus. No conjunctival injection. ENT: No discharge. Pharynx clear. Neck: Trachea midline. Resp: No accessory muscle use. Diminished breath sounds. Poor air entry. No wheezes rales or rhonchi  CV: Regular rate. Regular rhythm. No murmur. No rub. No edema.    Capillary Refill: Brisk,< 3 seconds   Peripheral Pulses: +2 palpable, equal bilaterally   GI: Non-tender. Non-distended. Normal bowel sounds. Skin: Warm and dry. No nodule on exposed extremities. No rash on exposed extremities. M/S: No cyanosis. No joint deformity. No clubbing. Neuro: Awake. Grossly nonfocal    Psych: Oriented x 3. + Mild anxiety. No agitation. Scheduled Meds:   valsartan  320 mg Oral Daily    insulin lispro  0-12 Units SubCUTAneous TID WC    insulin lispro  0-6 Units SubCUTAneous Nightly    baricitinib  4 mg Oral Daily    sodium chloride flush  5-40 mL IntraVENous 2 times per day    enoxaparin  30 mg SubCUTAneous BID    dexamethasone  6 mg IntraVENous Q24H    Vitamin D  2,000 Units Oral Daily    ascorbic acid  500 mg Oral Daily       Continuous Infusions:   dextrose      sodium chloride         PRN Meds:  HYDROcodone-homatropine, albuterol sulfate HFA **AND** ipratropium, benzonatate, albuterol sulfate HFA, glucose, glucagon (rDNA), dextrose, sodium chloride flush, sodium chloride, ondansetron **OR** ondansetron, polyethylene glycol, acetaminophen **OR** acetaminophen, guaiFENesin-dextromethorphan, dextrose bolus (hypoglycemia) **OR** dextrose bolus (hypoglycemia)      Data:  CBC:   Recent Labs     02/14/22 2125 02/16/22 0536 02/17/22  0630   WBC 8.3 9.5 10.4   HGB 13.8 13.0* 13.6   HCT 40.8 37.7* 41.6   MCV 83.9 84.8 85.7    289 306     BMP:   Recent Labs     02/14/22 2125 02/16/22  0536 02/17/22  0630    139 140   K 3.8 4.3 4.5   CL 97* 104 104   CO2 28 25 22   BUN 14 16 17   CREATININE 0.7* 0.6* 0.6*     LIVER PROFILE:   Recent Labs     02/14/22 2125 02/16/22  0536 02/17/22  0630   AST 35 33 31   ALT 35 29 31   BILITOT 1.7* 0.4 0.4   ALKPHOS 47 44 42       CULTURES  Results for Juliane Ivan (MRN [id number]) as of 2/16/2022 12:27   Ref.  Range 2/14/2022 21:25   INFLUENZA A Latest Ref Range: NOT DETECTED  NOT DETECTED   INFLUENZA B Latest Ref Range: NOT DETECTED  NOT DETECTED   SARS-CoV-2 RNA, RT PCR Latest Ref Range: NOT DETECTED  DETECTED (A)        RADIOLOGY  CT CHEST PULMONARY EMBOLISM W CONTRAST   Final Result   No evidence of pulmonary embolism. Scattered areas of patchy and ground-glass opacities are noted throughout the   bilateral lungs. These are nonspecific but could indicate an atypical/viral   pneumonia, inclusive of COVID-19. XR CHEST 1 VIEW   Final Result   Scattered multifocal airspace opacities compatible with multifocal pneumonia. Assessment/Plan:    #Acute respiratory failure with hypoxia   #COVID-19 pneumonia   -Unvaccinated patient   -Symptom onset on 2/7 . Patient states he tested positive for COVID 2/11 at PCP appt; we do not have record of this. COVID positive here on 2/14 by PCR . Received 10 day course of doxycycline on 2/11 and completed 4 of 10 days with no improvement in sxs  -Droplet plus precautions, continuous telemetry monitoring, continuous pulse oximetry  -Hypoxic on presentation , not on home oxygen . satting 88% on RA in the ER-> was  3 L O2-> worsening hypoxemia now. -Seen in consultation by pulmonology. -O2 requirement up from 3L-> 12-> 15L. Needs Vapotherm, transferred to ICU level of care. .  -Continue Decadron day #3  -Continue baricitinib day #2.   -D dimer elevated at 373 . Chest CT with bilateral GGO, CXR displays multifocal pna. Neg for PE  -PCT 0.11; hold off on abx for now  -CRP 51.8  -IBD      #DM2  -Hold home meds   -Check Hgb A1c  -Med dose SSI  -Held off on Lantus as BGT in 150's and he is insulin naive  -CC diet     #HTN  -BP elevated  -Resumed home valsartan  -Monitor    #Morbid Obesity   -BMI is 82.3 kg/m²  -Complicating assessment and treatment.    -Placing patient at risk for multiple comorbidities as well as early death and contributing to the patients presentation.   -Patient would benefit from weight loss    #ADALI  -Home CPAP    DVT Prophylaxis: Lovenox  Diet: ADULT DIET;  Regular; 4 carb choices (60 gm/meal)  Code Status: Full Code Asa Peoples MD  2/17/2022 12:55 PM

## 2022-02-17 NOTE — FLOWSHEET NOTE
02/16/22 1958   Vital Signs   Temp 101.3 °F (38.5 °C)   Temp Source Oral   Pulse 89   Heart Rate Source Monitor   Resp 18   /75   BP Location Right lower arm   Patient Position High fowlers   Level of Consciousness Alert (0)   MEWS Score 3   Patient Currently in Pain Denies   Pain Assessment   Pain Assessment 0-10   Pain Level 0   Oxygen Therapy   SpO2 94 %   O2 Device Nasal cannula   O2 Flow Rate (L/min) 3 L/min      Patient A+Ox4, vitals and assessments done at this time, patient has fever of 101.3 orally.  Bed in lowest position, call light within reach

## 2022-02-17 NOTE — PROGRESS NOTES
Transfer of care to [facility] RN ICU. Belongings with pt. Telemetry cleaned and returned to [facility].

## 2022-02-17 NOTE — PROGRESS NOTES
AM assessment complete. See flowsheets. Gave am meds due see mar PRN cough med given per pt request. 10L HFNC-91%. A/O x 4. Denies any pain. Bed locked/lowest position. Low fall risk/compliant with call light. Call light within reach. Katherine Cameron

## 2022-02-17 NOTE — PROGRESS NOTES
Reassessment completed (see Flowsheet). Pt now transferred to ICU for increased O2 needs. AirVo 40L 80%. pt is A/O, denies pain. Lung sounds diminished  pt's blood pressures WDL. Daughter at bedside. Continuing to monitor.

## 2022-02-17 NOTE — PROGRESS NOTES
Bedside report given to [facility]  pt in stable condition no needs at this time.  Call light within reach

## 2022-02-18 ENCOUNTER — APPOINTMENT (OUTPATIENT)
Dept: INTERVENTIONAL RADIOLOGY/VASCULAR | Age: 52
DRG: 177 | End: 2022-02-18
Payer: COMMERCIAL

## 2022-02-18 ENCOUNTER — APPOINTMENT (OUTPATIENT)
Dept: GENERAL RADIOLOGY | Age: 52
DRG: 177 | End: 2022-02-18
Payer: COMMERCIAL

## 2022-02-18 LAB
A/G RATIO: 1 (ref 1.1–2.2)
ALBUMIN SERPL-MCNC: 3.3 G/DL (ref 3.4–5)
ALP BLD-CCNC: 39 U/L (ref 40–129)
ALT SERPL-CCNC: 39 U/L (ref 10–40)
ANION GAP SERPL CALCULATED.3IONS-SCNC: 10 MMOL/L (ref 3–16)
AST SERPL-CCNC: 33 U/L (ref 15–37)
BASOPHILS ABSOLUTE: 0 K/UL (ref 0–0.2)
BASOPHILS RELATIVE PERCENT: 0.1 %
BILIRUB SERPL-MCNC: 0.4 MG/DL (ref 0–1)
BUN BLDV-MCNC: 17 MG/DL (ref 7–20)
C-REACTIVE PROTEIN: 14.8 MG/L (ref 0–5.1)
CALCIUM SERPL-MCNC: 8.5 MG/DL (ref 8.3–10.6)
CHLORIDE BLD-SCNC: 101 MMOL/L (ref 99–110)
CO2: 26 MMOL/L (ref 21–32)
CREAT SERPL-MCNC: 0.6 MG/DL (ref 0.9–1.3)
EOSINOPHILS ABSOLUTE: 0 K/UL (ref 0–0.6)
EOSINOPHILS RELATIVE PERCENT: 0.1 %
GFR AFRICAN AMERICAN: >60
GFR NON-AFRICAN AMERICAN: >60
GLUCOSE BLD-MCNC: 102 MG/DL (ref 70–99)
GLUCOSE BLD-MCNC: 106 MG/DL (ref 70–99)
GLUCOSE BLD-MCNC: 153 MG/DL (ref 70–99)
GLUCOSE BLD-MCNC: 161 MG/DL (ref 70–99)
GLUCOSE BLD-MCNC: 190 MG/DL (ref 70–99)
HCT VFR BLD CALC: 39.9 % (ref 40.5–52.5)
HEMOGLOBIN: 13.2 G/DL (ref 13.5–17.5)
INR BLD: 1.23 (ref 0.88–1.12)
LYMPHOCYTES ABSOLUTE: 1.6 K/UL (ref 1–5.1)
LYMPHOCYTES RELATIVE PERCENT: 19.4 %
MCH RBC QN AUTO: 27.8 PG (ref 26–34)
MCHC RBC AUTO-ENTMCNC: 33.1 G/DL (ref 31–36)
MCV RBC AUTO: 84 FL (ref 80–100)
MONOCYTES ABSOLUTE: 0.5 K/UL (ref 0–1.3)
MONOCYTES RELATIVE PERCENT: 6.1 %
NEUTROPHILS ABSOLUTE: 6.3 K/UL (ref 1.7–7.7)
NEUTROPHILS RELATIVE PERCENT: 74.3 %
PDW BLD-RTO: 14.3 % (ref 12.4–15.4)
PERFORMED ON: ABNORMAL
PLATELET # BLD: 297 K/UL (ref 135–450)
PMV BLD AUTO: 7.9 FL (ref 5–10.5)
POTASSIUM REFLEX MAGNESIUM: 4.3 MMOL/L (ref 3.5–5.1)
PROTHROMBIN TIME: 14 SEC (ref 9.9–12.7)
RBC # BLD: 4.75 M/UL (ref 4.2–5.9)
SODIUM BLD-SCNC: 137 MMOL/L (ref 136–145)
TOTAL PROTEIN: 6.5 G/DL (ref 6.4–8.2)
WBC # BLD: 8.5 K/UL (ref 4–11)

## 2022-02-18 PROCEDURE — 6370000000 HC RX 637 (ALT 250 FOR IP): Performed by: INTERNAL MEDICINE

## 2022-02-18 PROCEDURE — C1769 GUIDE WIRE: HCPCS

## 2022-02-18 PROCEDURE — 6360000002 HC RX W HCPCS: Performed by: INTERNAL MEDICINE

## 2022-02-18 PROCEDURE — 93005 ELECTROCARDIOGRAM TRACING: CPT | Performed by: INTERNAL MEDICINE

## 2022-02-18 PROCEDURE — 85610 PROTHROMBIN TIME: CPT

## 2022-02-18 PROCEDURE — 94761 N-INVAS EAR/PLS OXIMETRY MLT: CPT

## 2022-02-18 PROCEDURE — 99291 CRITICAL CARE FIRST HOUR: CPT | Performed by: INTERNAL MEDICINE

## 2022-02-18 PROCEDURE — 2000000000 HC ICU R&B

## 2022-02-18 PROCEDURE — 36592 COLLECT BLOOD FROM PICC: CPT

## 2022-02-18 PROCEDURE — 2700000000 HC OXYGEN THERAPY PER DAY

## 2022-02-18 PROCEDURE — 71045 X-RAY EXAM CHEST 1 VIEW: CPT

## 2022-02-18 PROCEDURE — 2580000003 HC RX 258: Performed by: INTERNAL MEDICINE

## 2022-02-18 PROCEDURE — 99233 SBSQ HOSP IP/OBS HIGH 50: CPT | Performed by: INTERNAL MEDICINE

## 2022-02-18 PROCEDURE — 94660 CPAP INITIATION&MGMT: CPT

## 2022-02-18 PROCEDURE — 80053 COMPREHEN METABOLIC PANEL: CPT

## 2022-02-18 PROCEDURE — 36415 COLL VENOUS BLD VENIPUNCTURE: CPT

## 2022-02-18 PROCEDURE — 85025 COMPLETE CBC W/AUTO DIFF WBC: CPT

## 2022-02-18 PROCEDURE — 36573 INSJ PICC RS&I 5 YR+: CPT

## 2022-02-18 PROCEDURE — 86140 C-REACTIVE PROTEIN: CPT

## 2022-02-18 PROCEDURE — 6370000000 HC RX 637 (ALT 250 FOR IP)

## 2022-02-18 PROCEDURE — 02HV33Z INSERTION OF INFUSION DEVICE INTO SUPERIOR VENA CAVA, PERCUTANEOUS APPROACH: ICD-10-PCS | Performed by: RADIOLOGY

## 2022-02-18 RX ORDER — LIDOCAINE HYDROCHLORIDE 10 MG/ML
5 INJECTION, SOLUTION EPIDURAL; INFILTRATION; INTRACAUDAL; PERINEURAL ONCE
Status: DISCONTINUED | OUTPATIENT
Start: 2022-02-18 | End: 2022-02-19 | Stop reason: SDUPTHER

## 2022-02-18 RX ORDER — SODIUM CHLORIDE 9 MG/ML
25 INJECTION, SOLUTION INTRAVENOUS PRN
Status: DISCONTINUED | OUTPATIENT
Start: 2022-02-18 | End: 2022-02-22 | Stop reason: HOSPADM

## 2022-02-18 RX ORDER — SODIUM CHLORIDE 0.9 % (FLUSH) 0.9 %
5-40 SYRINGE (ML) INJECTION EVERY 12 HOURS SCHEDULED
Status: DISCONTINUED | OUTPATIENT
Start: 2022-02-18 | End: 2022-02-22 | Stop reason: HOSPADM

## 2022-02-18 RX ORDER — BENZONATATE 100 MG/1
200 CAPSULE ORAL 3 TIMES DAILY
Status: DISCONTINUED | OUTPATIENT
Start: 2022-02-18 | End: 2022-02-22 | Stop reason: HOSPADM

## 2022-02-18 RX ORDER — SODIUM CHLORIDE 0.9 % (FLUSH) 0.9 %
5-40 SYRINGE (ML) INJECTION PRN
Status: DISCONTINUED | OUTPATIENT
Start: 2022-02-18 | End: 2022-02-22 | Stop reason: HOSPADM

## 2022-02-18 RX ADMIN — BARICITINIB 4 MG: 2 TABLET, FILM COATED ORAL at 08:22

## 2022-02-18 RX ADMIN — ENOXAPARIN SODIUM 40 MG: 100 INJECTION SUBCUTANEOUS at 20:11

## 2022-02-18 RX ADMIN — OXYCODONE HYDROCHLORIDE AND ACETAMINOPHEN 500 MG: 500 TABLET ORAL at 08:22

## 2022-02-18 RX ADMIN — DEXAMETHASONE SODIUM PHOSPHATE 6 MG: 10 INJECTION INTRAMUSCULAR; INTRAVENOUS at 20:11

## 2022-02-18 RX ADMIN — VALSARTAN 320 MG: 80 TABLET, FILM COATED ORAL at 08:22

## 2022-02-18 RX ADMIN — GUAIFENESIN AND DEXTROMETHORPHAN 5 ML: 100; 10 SYRUP ORAL at 02:54

## 2022-02-18 RX ADMIN — MUPIROCIN: 20 OINTMENT TOPICAL at 08:34

## 2022-02-18 RX ADMIN — Medication 2000 UNITS: at 08:22

## 2022-02-18 RX ADMIN — BENZONATATE 100 MG: 100 CAPSULE ORAL at 08:34

## 2022-02-18 RX ADMIN — SODIUM CHLORIDE, PRESERVATIVE FREE 10 ML: 5 INJECTION INTRAVENOUS at 20:12

## 2022-02-18 RX ADMIN — SODIUM CHLORIDE, PRESERVATIVE FREE 10 ML: 5 INJECTION INTRAVENOUS at 08:23

## 2022-02-18 RX ADMIN — HYDROCODONE BITARTRATE AND HOMATROPINE METHYLBROMIDE 5 ML: 5; 1.5 SOLUTION ORAL at 08:34

## 2022-02-18 RX ADMIN — ENOXAPARIN SODIUM 40 MG: 100 INJECTION SUBCUTANEOUS at 08:22

## 2022-02-18 RX ADMIN — BENZONATATE 200 MG: 100 CAPSULE ORAL at 20:11

## 2022-02-18 RX ADMIN — MUPIROCIN: 20 OINTMENT TOPICAL at 20:12

## 2022-02-18 RX ADMIN — BENZONATATE 200 MG: 100 CAPSULE ORAL at 15:49

## 2022-02-18 RX ADMIN — INSULIN LISPRO 1 UNITS: 100 INJECTION, SOLUTION INTRAVENOUS; SUBCUTANEOUS at 19:37

## 2022-02-18 ASSESSMENT — PAIN SCALES - GENERAL: PAINLEVEL_OUTOF10: 0

## 2022-02-18 NOTE — PROGRESS NOTES
02/18/22 0313   NIV Type   Equipment Type V60   Mode CPAP   Mask Type Other (Comment)  (UNDER THE NOSE MASK)   Settings/Measurements   CPAP/EPAP 10 cmH2O   Resp 15   FiO2  70 %   Vt Exhaled 688 mL   Mask Leak (lpm) 24 lpm   Comfort Level Good   Using Accessory Muscles No   SpO2 99   Alarm Settings   Alarms On Y   Oxygen Therapy/Pulse Ox   SpO2 99 %

## 2022-02-18 NOTE — PROGRESS NOTES
02/17/22 1084   NIV Type   $NIV $Daily Charge   Skin Assessment Clean, dry, & intact   Skin Protection for O2 Device N/A   NIV Started/Stopped On   Equipment Type V60   Mode CPAP   Mask Type Other (Comment)  (UNDER THE NOSE)   Settings/Measurements   CPAP/EPAP 10 cmH2O   Resp 19   FiO2  70 %   Vt Exhaled 501 mL   Mask Leak (lpm) 40 lpm   Comfort Level Good   Using Accessory Muscles No   SpO2 96   Alarm Settings   Alarms On Y   Oxygen Therapy/Pulse Ox   SpO2 95 %

## 2022-02-18 NOTE — PROGRESS NOTES
Pulmonary & Critical Care Medicine ICU Progress Note    CC: COVID-19    Events of Last 24 hours: Moved to ICU for vapotherm 70% and 30LPM   No drips     Vascular lines: IV: PIVs         / / /FiO2 : 70 %  No results for input(s): PHART, [id number], PO2ART in the last 72 hours. IV:   dextrose      sodium chloride         Vitals:  Blood pressure 134/82, pulse 54, temperature 98 °F (36.7 °C), temperature source Axillary, resp. rate 9, height 6' 4\" (1.93 m), weight (!) 330 lb 8 oz (149.9 kg), SpO2 99 %. on Vapotherm   Temp  Av °F (36.7 °C)  Min: 97 °F (36.1 °C)  Max: 98.3 °F (36.8 °C)    Intake/Output Summary (Last 24 hours) at 2022 0721  Last data filed at 2022 0600  Gross per 24 hour   Intake 640 ml   Output 1175 ml   Net -535 ml     PE:  General: ill appearing    Eyes: PERRL. No sclera icterus. No conjunctival injection. ENT: No discharge. Pharynx clear. Neck: Trachea midline. Normal thyroid. Resp: + accessory muscle use. Bilateral crackles. No wheezing. No rhonchi. No dullness on percussion. CV: Regular rate. Regular rhythm. No mumur or rub. No edema. GI: Non-tender. Non-distended. No masses. No organomegaly. Normal bowel sounds. No hernia. Skin: Warm and dry. No nodule on exposed extremities. No rash on exposed extremities. Lymph: No cervical LAD. No supraclavicular LAD. M/S: No cyanosis. No joint deformity. No clubbing. Neuro: AAOx3. Followed commands.    Psych: No agitation, no anxiety, affect is full     Scheduled Meds:   valsartan  320 mg Oral Daily    insulin lispro  0-12 Units SubCUTAneous TID WC    insulin lispro  0-6 Units SubCUTAneous Nightly    baricitinib  4 mg Oral Daily    sodium chloride flush  5-40 mL IntraVENous 2 times per day    enoxaparin  30 mg SubCUTAneous BID    dexamethasone  6 mg IntraVENous Q24H    Vitamin D  2,000 Units Oral Daily    ascorbic acid  500 mg Oral Daily       Data:  CBC:   Recent Labs     22  0536 22  0630 22  0417 WBC 9.5 10.4 8.5   HGB 13.0* 13.6 13.2*   HCT 37.7* 41.6 39.9*   MCV 84.8 85.7 84.0    306 297     BMP:   Recent Labs     02/16/22  0536 02/17/22  0630 02/18/22  0416    140 137   K 4.3 4.5 4.3    104 101   CO2 25 22 26   BUN 16 17 17   CREATININE 0.6* 0.6* 0.6*     LIVER PROFILE:   Recent Labs     02/16/22  0536 02/17/22  0630 02/18/22  0416   AST 33 31 33   ALT 29 31 39   BILITOT 0.4 0.4 0.4   ALKPHOS 45 37 39*       Microbiology:  2/14 COVID-19 detected    Imaging:  CTPA 2/15 imaging reviewed by me and showed  No evidence of pulmonary embolism. Scattered areas of patchy and ground-glass opacities are noted throughout the   bilateral lungs. These are nonspecific but could indicate an atypical/viral   pneumonia, inclusive of COVID-19      ASSESSMENT:  · Acute hypoxemic respiratory failure-  · COVID 19 pneumonia in an unvaccinated individual  · ARDS  · Dry Cough  · DM2  · HTN  · Morbid Obesity  · ADALI on CPAP 14  · Unvaccinated      PLAN:  Vapotherm for life-threatening acute hypoxemic respiratory failure and titrate to maintain SaO2 >92%  CPAP 10 cmH2O QHS and PRN during the day   ICU with closely monitory airways, O2 sat, clinical status, cardiac rhythm, vital signs for possible intubation/MV.  D/W patient    Continuous pulse oximetry  Droplet plus isolation (surgical mask, eye protection, gown, glove)  Supervised self proning  Benzonatate 200 TID and Hycodan as needed  Moved to negative pressure room in case needs aerosolized procedure  Avoid NEBs as able-albuterol MDI strongly preferred   Dexamethasone 6 mg IV day #4-monitor blood glucose closely  Baricitinib day #3   PICC line  Blood sugar control ISS, with goal 150-180  Lovenox BID       Total critical care time caring for this patient with life threatening, unstable organ failure, including direct patient contact, management of life support systems, review of data including imaging and labs, discussions with other team members and physicians is 31 minutes, excluding procedures.

## 2022-02-18 NOTE — PROGRESS NOTES
Consent obtained for PICC line at this time. Discussed risk vs benefits, no concerns or questions at this time.

## 2022-02-18 NOTE — PROGRESS NOTES
Shift assessment complete. See doc flow sheets. Patient resting in bed quietly at this time. Vital signs stable, will continue to monitor.

## 2022-02-18 NOTE — PROGRESS NOTES
Shift handoff report given to Rachael COTTRELL at bedside. Pt is Awake and alert  IV handoff completed. 4 eyes to follow. Call light within reach, bed in lowest position, bed alarm on. End of shift checks completed. Pt has been free of falls for duration of shift. Wilmer Reyes

## 2022-02-18 NOTE — PROGRESS NOTES
Shift assessment completed as documented on flowsheet. VSS Pt sitting up in bed, watching tv. No signs of distress at this time. Voids per urinal. Denies sob, pain, or discomfort.  Call light in reach

## 2022-02-18 NOTE — PROGRESS NOTES
Progress Note    Admit Date:  2/14/2022    45 y.o. male with PMH below, presents with fatigue, myalgias, SOB, cough, COVID +, HA, decreased appetite/PO intake. PT reports COVID like Sx which started ~7d ago. He was Dx at his PCP's office 3 d ago but we are unable to verify this result. Tested positive in ED by PCR. He was started on Augmentin, alb INH, prednisone by his PCP 3 days ago. All of the above Sx are progressively worsening. 2/17   Patient with worsening hypoxemia. He was on oxygen 3 L yesterday. Overnight O2 requirement up to 12 L. O2 sats today 88% on 12 L. O2 requirement up to 15 L currently    Subjective:  Mr. Sanjana Hua was transferred to ICU 2/17 for increasing oxygen requirement. Placed on air Vo. Now on 60% 25 L  Comfortable at rest  Objective:     Patient Vitals for the past 4 hrs:   BP Temp Temp src Pulse Resp SpO2   02/18/22 0800 (!) 137/93 -- -- 54 10 93 %   02/18/22 0700 126/83 97.8 °F (36.6 °C) Oral 53 15 98 %   02/18/22 0600 134/82 -- -- 54 9 --   02/18/22 0500 126/78 -- -- 55 14 --            Intake/Output Summary (Last 24 hours) at 2/18/2022 0824  Last data filed at 2/18/2022 2433  Gross per 24 hour   Intake 640 ml   Output 1675 ml   Net -1035 ml       Physical Exam:  Patient in droplet plus precautions  Gen: He appears ill and fatigued . Awake alert and well-oriented. Eyes: PERRL. No sclera icterus. No conjunctival injection. ENT: No discharge. Pharynx clear. Neck: Trachea midline. Resp: No accessory muscle use. Diminished breath sounds. Poor air entry. No wheezes rales or rhonchi  CV: Regular rate. Regular rhythm. No murmur. No rub. No edema. Capillary Refill: Brisk,< 3 seconds   Peripheral Pulses: +2 palpable, equal bilaterally   GI: Non-tender. Non-distended. Normal bowel sounds. Skin: Warm and dry. No nodule on exposed extremities. No rash on exposed extremities. M/S: No cyanosis. No joint deformity. No clubbing. Neuro: Awake.  Grossly nonfocal    Psych: Oriented x 3. + Mild anxiety. No agitation. Scheduled Meds:   enoxaparin  40 mg SubCUTAneous BID    mupirocin   Nasal BID    valsartan  320 mg Oral Daily    insulin lispro  0-12 Units SubCUTAneous TID WC    insulin lispro  0-6 Units SubCUTAneous Nightly    baricitinib  4 mg Oral Daily    sodium chloride flush  5-40 mL IntraVENous 2 times per day    dexamethasone  6 mg IntraVENous Q24H    Vitamin D  2,000 Units Oral Daily    ascorbic acid  500 mg Oral Daily       Continuous Infusions:   dextrose      sodium chloride         PRN Meds:  HYDROcodone-homatropine, albuterol sulfate HFA **AND** ipratropium, benzonatate, albuterol sulfate HFA, glucose, glucagon (rDNA), dextrose, sodium chloride flush, sodium chloride, ondansetron **OR** ondansetron, polyethylene glycol, acetaminophen **OR** acetaminophen, guaiFENesin-dextromethorphan, dextrose bolus (hypoglycemia) **OR** dextrose bolus (hypoglycemia)      Data:  CBC:   Recent Labs     02/16/22  0536 02/17/22  0630 02/18/22  0417   WBC 9.5 10.4 8.5   HGB 13.0* 13.6 13.2*   HCT 37.7* 41.6 39.9*   MCV 84.8 85.7 84.0    306 297     BMP:   Recent Labs     02/16/22  0536 02/17/22  0630 02/18/22  0416    140 137   K 4.3 4.5 4.3    104 101   CO2 25 22 26   BUN 16 17 17   CREATININE 0.6* 0.6* 0.6*     LIVER PROFILE:   Recent Labs     02/16/22  0536 02/17/22  0630 02/18/22  0416   AST 33 31 33   ALT 29 31 39   BILITOT 0.4 0.4 0.4   ALKPHOS 44 42 39*       CULTURES  Results for Radha Hankins (MRN [id number]) as of 2/16/2022 12:27   Ref. Range 2/14/2022 21:25   INFLUENZA A Latest Ref Range: NOT DETECTED  NOT DETECTED   INFLUENZA B Latest Ref Range: NOT DETECTED  NOT DETECTED   SARS-CoV-2 RNA, RT PCR Latest Ref Range: NOT DETECTED  DETECTED (A)        RADIOLOGY  CT CHEST PULMONARY EMBOLISM W CONTRAST   Final Result   No evidence of pulmonary embolism. Scattered areas of patchy and ground-glass opacities are noted throughout the   bilateral lungs. These are nonspecific but could indicate an atypical/viral   pneumonia, inclusive of COVID-19. XR CHEST 1 VIEW   Final Result   Scattered multifocal airspace opacities compatible with multifocal pneumonia. Assessment/Plan:    #Acute respiratory failure with hypoxia   #COVID-19 pneumonia   -Unvaccinated patient   -Symptom onset on 2/7 . Patient states he tested positive for COVID 2/11 at PCP appt; we do not have record of this. COVID positive here on 2/14 by PCR . Received 10 day course of doxycycline on 2/11 and completed 4 of 10 days with no improvement in sxs  -Droplet plus precautions, continuous telemetry monitoring, continuous pulse oximetry  -Hypoxic on presentation , not on home oxygen . satting 88% on RA in the ER-> was  3 L O2-> worsening hypoxemia now. -Seen in consultation by pulmonology. -O2 requirement up from 3L-> 12-> 15L--> Vapotherm 70% 40 L--> air Vo  -Continue Decadron day #4  -Continue baricitinib day #3.   -D dimer elevated at 373 . Chest CT with bilateral GGO, CXR displays multifocal pna. Neg for PE  -PCT 0.11; hold off on abx for now  -CRP 51.8--> 14.8  -IBD       #DM2  -Hold home meds   -Check Hgb A1c  -Med dose SSI  -Held off on Lantus as BGT in 150's and he is insulin naive  -CC diet     #HTN  -BP elevated  -Resumed home valsartan  -Monitor    #Morbid Obesity   -BMI is 61.2 kg/m²  -Complicating assessment and treatment.    -Placing patient at risk for multiple comorbidities as well as early death and contributing to the patients presentation.   -Patient would benefit from weight loss    #ADALI  -Home CPAP    DVT Prophylaxis: Lovenox  Diet: ADULT DIET;  Regular; 4 carb choices (60 gm/meal)  Code Status: Full Code       Lisa Maurer MD  2/18/2022 8:24 AM

## 2022-02-18 NOTE — PROGRESS NOTES
BARICITINIB MONITORING     Substantial increase in LFTs? no   Hemoglobin <8.0 g/dL? no   ANC <500 cells/mcL? no   ALC <200 cells/mcL? no     Dose: 4mg daily  End date: 3/1     Pharmacy will continue to monitor.     Ladarius Brunson Pharm D 2/18/20221:08 PM  .

## 2022-02-19 ENCOUNTER — APPOINTMENT (OUTPATIENT)
Dept: GENERAL RADIOLOGY | Age: 52
DRG: 177 | End: 2022-02-19
Payer: COMMERCIAL

## 2022-02-19 LAB
A/G RATIO: 1.1 (ref 1.1–2.2)
ALBUMIN SERPL-MCNC: 3.5 G/DL (ref 3.4–5)
ALP BLD-CCNC: 44 U/L (ref 40–129)
ALT SERPL-CCNC: 52 U/L (ref 10–40)
ANION GAP SERPL CALCULATED.3IONS-SCNC: 9 MMOL/L (ref 3–16)
AST SERPL-CCNC: 30 U/L (ref 15–37)
BASOPHILS ABSOLUTE: 0 K/UL (ref 0–0.2)
BASOPHILS RELATIVE PERCENT: 0.1 %
BILIRUB SERPL-MCNC: 0.5 MG/DL (ref 0–1)
BUN BLDV-MCNC: 15 MG/DL (ref 7–20)
CALCIUM SERPL-MCNC: 8.9 MG/DL (ref 8.3–10.6)
CHLORIDE BLD-SCNC: 99 MMOL/L (ref 99–110)
CO2: 29 MMOL/L (ref 21–32)
CREAT SERPL-MCNC: 0.6 MG/DL (ref 0.9–1.3)
EKG ATRIAL RATE: 77 BPM
EKG DIAGNOSIS: NORMAL
EKG P AXIS: 55 DEGREES
EKG P-R INTERVAL: 160 MS
EKG Q-T INTERVAL: 418 MS
EKG QRS DURATION: 106 MS
EKG QTC CALCULATION (BAZETT): 473 MS
EKG R AXIS: 9 DEGREES
EKG T AXIS: 29 DEGREES
EKG VENTRICULAR RATE: 77 BPM
EOSINOPHILS ABSOLUTE: 0 K/UL (ref 0–0.6)
EOSINOPHILS RELATIVE PERCENT: 0.1 %
GFR AFRICAN AMERICAN: >60
GFR NON-AFRICAN AMERICAN: >60
GLUCOSE BLD-MCNC: 115 MG/DL (ref 70–99)
GLUCOSE BLD-MCNC: 121 MG/DL (ref 70–99)
GLUCOSE BLD-MCNC: 162 MG/DL (ref 70–99)
GLUCOSE BLD-MCNC: 183 MG/DL (ref 70–99)
HCT VFR BLD CALC: 39.3 % (ref 40.5–52.5)
HEMOGLOBIN: 13.3 G/DL (ref 13.5–17.5)
LYMPHOCYTES ABSOLUTE: 1.6 K/UL (ref 1–5.1)
LYMPHOCYTES RELATIVE PERCENT: 15.1 %
MCH RBC QN AUTO: 28.1 PG (ref 26–34)
MCHC RBC AUTO-ENTMCNC: 33.8 G/DL (ref 31–36)
MCV RBC AUTO: 83.1 FL (ref 80–100)
MONOCYTES ABSOLUTE: 0.4 K/UL (ref 0–1.3)
MONOCYTES RELATIVE PERCENT: 4.2 %
NEUTROPHILS ABSOLUTE: 8.5 K/UL (ref 1.7–7.7)
NEUTROPHILS RELATIVE PERCENT: 80.5 %
PDW BLD-RTO: 14 % (ref 12.4–15.4)
PERFORMED ON: ABNORMAL
PLATELET # BLD: 290 K/UL (ref 135–450)
PMV BLD AUTO: 7.5 FL (ref 5–10.5)
POTASSIUM REFLEX MAGNESIUM: 4.4 MMOL/L (ref 3.5–5.1)
RBC # BLD: 4.73 M/UL (ref 4.2–5.9)
SODIUM BLD-SCNC: 137 MMOL/L (ref 136–145)
TOTAL PROTEIN: 6.8 G/DL (ref 6.4–8.2)
WBC # BLD: 10.5 K/UL (ref 4–11)

## 2022-02-19 PROCEDURE — 6370000000 HC RX 637 (ALT 250 FOR IP)

## 2022-02-19 PROCEDURE — 94761 N-INVAS EAR/PLS OXIMETRY MLT: CPT

## 2022-02-19 PROCEDURE — 6370000000 HC RX 637 (ALT 250 FOR IP): Performed by: INTERNAL MEDICINE

## 2022-02-19 PROCEDURE — 2000000000 HC ICU R&B

## 2022-02-19 PROCEDURE — 6360000002 HC RX W HCPCS: Performed by: INTERNAL MEDICINE

## 2022-02-19 PROCEDURE — 2580000003 HC RX 258: Performed by: INTERNAL MEDICINE

## 2022-02-19 PROCEDURE — 71045 X-RAY EXAM CHEST 1 VIEW: CPT

## 2022-02-19 PROCEDURE — 94660 CPAP INITIATION&MGMT: CPT

## 2022-02-19 PROCEDURE — 2700000000 HC OXYGEN THERAPY PER DAY

## 2022-02-19 PROCEDURE — 99233 SBSQ HOSP IP/OBS HIGH 50: CPT | Performed by: INTERNAL MEDICINE

## 2022-02-19 PROCEDURE — 80053 COMPREHEN METABOLIC PANEL: CPT

## 2022-02-19 PROCEDURE — 93010 ELECTROCARDIOGRAM REPORT: CPT | Performed by: INTERNAL MEDICINE

## 2022-02-19 PROCEDURE — 85025 COMPLETE CBC W/AUTO DIFF WBC: CPT

## 2022-02-19 PROCEDURE — 99291 CRITICAL CARE FIRST HOUR: CPT | Performed by: INTERNAL MEDICINE

## 2022-02-19 RX ORDER — SODIUM CHLORIDE 9 MG/ML
25 INJECTION, SOLUTION INTRAVENOUS PRN
Status: DISCONTINUED | OUTPATIENT
Start: 2022-02-19 | End: 2022-02-19 | Stop reason: SDUPTHER

## 2022-02-19 RX ORDER — LIDOCAINE HYDROCHLORIDE 10 MG/ML
5 INJECTION, SOLUTION EPIDURAL; INFILTRATION; INTRACAUDAL; PERINEURAL ONCE
Status: DISCONTINUED | OUTPATIENT
Start: 2022-02-19 | End: 2022-02-22 | Stop reason: HOSPADM

## 2022-02-19 RX ORDER — SODIUM CHLORIDE 0.9 % (FLUSH) 0.9 %
5-40 SYRINGE (ML) INJECTION PRN
Status: DISCONTINUED | OUTPATIENT
Start: 2022-02-19 | End: 2022-02-19 | Stop reason: SDUPTHER

## 2022-02-19 RX ORDER — SODIUM CHLORIDE 0.9 % (FLUSH) 0.9 %
5-40 SYRINGE (ML) INJECTION EVERY 12 HOURS SCHEDULED
Status: DISCONTINUED | OUTPATIENT
Start: 2022-02-19 | End: 2022-02-19 | Stop reason: SDUPTHER

## 2022-02-19 RX ADMIN — HYDROCODONE BITARTRATE AND HOMATROPINE METHYLBROMIDE 5 ML: 5; 1.5 SOLUTION ORAL at 00:26

## 2022-02-19 RX ADMIN — ENOXAPARIN SODIUM 40 MG: 100 INJECTION SUBCUTANEOUS at 20:18

## 2022-02-19 RX ADMIN — MUPIROCIN: 20 OINTMENT TOPICAL at 08:46

## 2022-02-19 RX ADMIN — BENZONATATE 200 MG: 100 CAPSULE ORAL at 08:48

## 2022-02-19 RX ADMIN — HYDROCODONE BITARTRATE AND HOMATROPINE METHYLBROMIDE 5 ML: 5; 1.5 SOLUTION ORAL at 20:18

## 2022-02-19 RX ADMIN — ENOXAPARIN SODIUM 40 MG: 100 INJECTION SUBCUTANEOUS at 08:48

## 2022-02-19 RX ADMIN — BENZONATATE 200 MG: 100 CAPSULE ORAL at 20:18

## 2022-02-19 RX ADMIN — OXYCODONE HYDROCHLORIDE AND ACETAMINOPHEN 500 MG: 500 TABLET ORAL at 08:48

## 2022-02-19 RX ADMIN — SODIUM CHLORIDE, PRESERVATIVE FREE 10 ML: 5 INJECTION INTRAVENOUS at 20:20

## 2022-02-19 RX ADMIN — SODIUM CHLORIDE, PRESERVATIVE FREE 10 ML: 5 INJECTION INTRAVENOUS at 20:21

## 2022-02-19 RX ADMIN — VALSARTAN 320 MG: 80 TABLET, FILM COATED ORAL at 08:48

## 2022-02-19 RX ADMIN — BARICITINIB 4 MG: 2 TABLET, FILM COATED ORAL at 08:48

## 2022-02-19 RX ADMIN — DEXAMETHASONE SODIUM PHOSPHATE 6 MG: 10 INJECTION INTRAMUSCULAR; INTRAVENOUS at 20:21

## 2022-02-19 RX ADMIN — Medication 2000 UNITS: at 08:48

## 2022-02-19 RX ADMIN — MUPIROCIN: 20 OINTMENT TOPICAL at 20:20

## 2022-02-19 RX ADMIN — SODIUM CHLORIDE, PRESERVATIVE FREE 10 ML: 5 INJECTION INTRAVENOUS at 08:46

## 2022-02-19 NOTE — PROGRESS NOTES
Progress Note    Admit Date:  2/14/2022    45 y.o. male with PMH below, presents with fatigue, myalgias, SOB, cough, COVID +, HA, decreased appetite/PO intake. PT reports COVID like Sx which started ~7d ago. He was Dx at his PCP's office 3 d ago but we are unable to verify this result. Tested positive in ED by PCR. He was started on Augmentin, alb INH, prednisone by his PCP 3 days ago. All of the above Sx are progressively worsening. 2/17   Patient with worsening hypoxemia. He was on oxygen 3 L yesterday. Overnight O2 requirement up to 12 L. O2 sats today 88% on 12 L. O2 requirement up to 15 L currently    Subjective:  Mr. Sanjana Hua was transferred to ICU 2/17 for increasing oxygen requirement. Placed on air Vo. Now on 60% 25 L--50% 25 L  Had V. tach last night which resolved following PICC line getting pulled back approximately 5 cm      Objective:     Patient Vitals for the past 4 hrs:   BP Pulse Resp SpO2   02/19/22 1200 132/65 -- -- 96 %   02/19/22 1134 -- -- 18 --   02/19/22 1100 (!) 146/116 64 18 94 %   02/19/22 1019 (!) 140/79 57 17 94 %            Intake/Output Summary (Last 24 hours) at 2/19/2022 1312  Last data filed at 2/19/2022 3081  Gross per 24 hour   Intake 365 ml   Output 2300 ml   Net -1935 ml       Physical Exam:  Patient in droplet plus precautions  Gen: He appears ill and fatigued . Awake alert and well-oriented. Eyes: PERRL. No sclera icterus. No conjunctival injection. ENT: No discharge. Pharynx clear. Neck: Trachea midline. Resp: No accessory muscle use. Diminished breath sounds. Poor air entry. No wheezes rales or rhonchi  CV: Regular rate. Regular rhythm. No murmur. No rub. No edema. Capillary Refill: Brisk,< 3 seconds   Peripheral Pulses: +2 palpable, equal bilaterally   GI: Non-tender. Non-distended. Normal bowel sounds. Skin: Warm and dry. No nodule on exposed extremities. No rash on exposed extremities. M/S: No cyanosis. No joint deformity. No clubbing. Neuro: Awake. Grossly nonfocal    Psych: Oriented x 3. + Mild anxiety. No agitation. Scheduled Meds:   enoxaparin  40 mg SubCUTAneous BID    mupirocin   Nasal BID    benzonatate  200 mg Oral TID    lidocaine 1 % injection  5 mL IntraDERmal Once    sodium chloride flush  5-40 mL IntraVENous 2 times per day    valsartan  320 mg Oral Daily    insulin lispro  0-12 Units SubCUTAneous TID WC    insulin lispro  0-6 Units SubCUTAneous Nightly    baricitinib  4 mg Oral Daily    sodium chloride flush  5-40 mL IntraVENous 2 times per day    dexamethasone  6 mg IntraVENous Q24H    Vitamin D  2,000 Units Oral Daily    ascorbic acid  500 mg Oral Daily       Continuous Infusions:   sodium chloride      dextrose      sodium chloride         PRN Meds:  sodium chloride flush, sodium chloride, HYDROcodone-homatropine, albuterol sulfate HFA **AND** ipratropium, albuterol sulfate HFA, glucose, glucagon (rDNA), dextrose, sodium chloride flush, sodium chloride, ondansetron **OR** ondansetron, polyethylene glycol, acetaminophen **OR** acetaminophen, guaiFENesin-dextromethorphan, dextrose bolus (hypoglycemia) **OR** dextrose bolus (hypoglycemia)      Data:  CBC:   Recent Labs     02/17/22  0630 02/18/22  0417 02/19/22  0459   WBC 10.4 8.5 10.5   HGB 13.6 13.2* 13.3*   HCT 41.6 39.9* 39.3*   MCV 85.7 84.0 83.1    297 290     BMP:   Recent Labs     02/17/22  0630 02/18/22  0416 02/19/22  0459    137 137   K 4.5 4.3 4.4    101 99   CO2 22 26 29   BUN 17 17 15   CREATININE 0.6* 0.6* 0.6*     LIVER PROFILE:   Recent Labs     02/17/22  0630 02/18/22  0416 02/19/22  0459   AST 31 33 30   ALT 31 39 52*   BILITOT 0.4 0.4 0.5   ALKPHOS 42 39* 44       CULTURES  Results for Dejan Martinez (MRN [id number]) as of 2/16/2022 12:27   Ref.  Range 2/14/2022 21:25   INFLUENZA A Latest Ref Range: NOT DETECTED  NOT DETECTED   INFLUENZA B Latest Ref Range: NOT DETECTED  NOT DETECTED   SARS-CoV-2 RNA, RT PCR Latest Ref Range: NOT DETECTED  DETECTED (A)        RADIOLOGY  XR CHEST PORTABLE   Final Result   1. Retraction of the right upper extremity PICC with the tip now in the lower   superior vena cava. 2. No significant change in subpleural, basilar predominant airspace   opacities consistent with COVID-19 pneumonia given patient history. 3. Pulmonary vascular congestion and mild cardiomegaly. XR CHEST PORTABLE   Final Result   PICC line noted likely within the 6 cm below the cavoatrial junction on the   AP view. It is conceivable this may be needs to be retracted up to 10 cm. Multifocal pneumonia compatible with COVID. Critical results discussed between Dr. Yasir Valles and Dr Momo Nguyễn on 2/18/2022 -            IR PICC WO SQ PORT/PUMP > 5 YEARS   Final Result   Successful placement of PICC line. CT CHEST PULMONARY EMBOLISM W CONTRAST   Final Result   No evidence of pulmonary embolism. Scattered areas of patchy and ground-glass opacities are noted throughout the   bilateral lungs. These are nonspecific but could indicate an atypical/viral   pneumonia, inclusive of COVID-19. XR CHEST 1 VIEW   Final Result   Scattered multifocal airspace opacities compatible with multifocal pneumonia. Assessment/Plan:    #Acute respiratory failure with hypoxia   #COVID-19 pneumonia   -Unvaccinated patient   -Symptom onset on 2/7 . Patient states he tested positive for COVID 2/11 at PCP appt; we do not have record of this. COVID positive here on 2/14 by PCR . Received 10 day course of doxycycline on 2/11 and completed 4 of 10 days with no improvement in sxs  -Droplet plus precautions, continuous telemetry monitoring, continuous pulse oximetry  -Hypoxic on presentation , not on home oxygen . satting 88% on RA in the ER-> was  3 L O2-> worsening hypoxemia now. -Seen in consultation by pulmonology.    -O2 requirement up from 3L-> 12-> 15L--> Vapotherm 70% 40 L--> air Vo 50% 25 L  -Continue Decadron day #5  -Continue baricitinib day #4.   -D dimer elevated at 373 . Chest CT with bilateral GGO, CXR displays multifocal pna. Neg for PE  -PCT 0.11; hold off on abx for now  -CRP 51.8--> 14.8  -IBD  Tessalon Perles and Hycodan as needed for cough       #DM2  -Hold home meds   -Check Hgb A1c  -Med dose SSI  -Held off on Lantus as BGT in 150's and he is insulin naive  -CC diet     #HTN  -BP elevated  -Resumed home valsartan  -Monitor    #Morbid Obesity   -BMI is 65.6 kg/m²  -Complicating assessment and treatment.    -Placing patient at risk for multiple comorbidities as well as early death and contributing to the patients presentation.   -Patient would benefit from weight loss    #ADALI  -Home CPAP    DVT Prophylaxis: Lovenox  Diet: ADULT DIET;  Regular; 4 carb choices (60 gm/meal)  Code Status: Full Code       Marylen Reamer, MD  2/19/2022 1:12 PM

## 2022-02-19 NOTE — PROGRESS NOTES
2/18/2022 2100 patient with episode of non sustained vtach while turned on right side which resolved when turned supine. EKG obtained showing NSR while supine. Dr. Charlotte Friedman notified. Chest xrays obtained. Dr. Charlotte Friedman back up to withdraw/readjust PICC line for better positioning. Follow up CXR this am. No more ectopy noted while turned or otherwise. Tolerating current airvo settings with oxygen saturations greater than 95% and respiratory rate less than 30 per minute. Denies pain, discomfort, or shortness of breath. Able to stand easily to urinate, without shortness of breath or decreased o2 sats. Cough controlled with current ordered meds. Safety and comfort maintained.

## 2022-02-19 NOTE — PROGRESS NOTES
02/18/22 1910   Oxygen Therapy/Pulse Ox   O2 Therapy Oxygen humidified   O2 Device Heated high flow cannula   O2 Flow Rate (L/min) 25 L/min   FiO2  60 %   Resp 19   SpO2 99 %   Pulse Oximeter Device Mode Other (Comment)

## 2022-02-19 NOTE — PROGRESS NOTES
Pulmonary & Critical Care Medicine ICU Progress Note    CC: COVID-19    Events of Last 24 hours:   V. tach overnight resolved with following PICC line approximately 5 cm. Moved to ICU for vapotherm 50% and 25LPM   No drips   CPAP overnight       Vascular lines: IV: PICC          / / /FiO2 : 60 % (placed back on airvo)  No results for input(s): PHART, [id number], PO2ART in the last 72 hours. IV:   sodium chloride      dextrose      sodium chloride         Vitals:  Blood pressure 114/78, pulse 55, temperature 98 °F (36.7 °C), temperature source Oral, resp. rate 19, height 6' 4\" (1.93 m), weight (!) 322 lb 9.6 oz (146.3 kg), SpO2 91 %. on Vapotherm   Temp  Av.3 °F (36.8 °C)  Min: 97.8 °F (36.6 °C)  Max: 99 °F (37.2 °C)    Intake/Output Summary (Last 24 hours) at 2022 0725  Last data filed at 2022 0655  Gross per 24 hour   Intake 365 ml   Output 2800 ml   Net -2435 ml     PE:  General: ill appearing    Eyes: PERRL. No sclera icterus. No conjunctival injection. ENT: No discharge. Pharynx clear. Neck: Trachea midline. Normal thyroid. Resp: + accessory muscle use. Bilateral crackles. No wheezing. Few rhonchi. No dullness on percussion. CV: Regular rate. Regular rhythm. No mumur or rub. No edema. GI: Non-tender. Non-distended. No masses. No organomegaly. Normal bowel sounds. No hernia. Skin: Warm and dry. No nodule on exposed extremities. No rash on exposed extremities. Lymph: No cervical LAD. No supraclavicular LAD. M/S: No cyanosis. No joint deformity. No clubbing. Neuro: AAOx3. Followed commands.    Psych: No agitation, no anxiety, affect is full     Scheduled Meds:   enoxaparin  40 mg SubCUTAneous BID    mupirocin   Nasal BID    benzonatate  200 mg Oral TID    lidocaine 1 % injection  5 mL IntraDERmal Once    sodium chloride flush  5-40 mL IntraVENous 2 times per day    valsartan  320 mg Oral Daily    insulin lispro  0-12 Units SubCUTAneous TID     insulin lispro  0-6 Units SubCUTAneous Nightly    baricitinib  4 mg Oral Daily    sodium chloride flush  5-40 mL IntraVENous 2 times per day    dexamethasone  6 mg IntraVENous Q24H    Vitamin D  2,000 Units Oral Daily    ascorbic acid  500 mg Oral Daily       Data:  CBC:   Recent Labs     02/17/22 0630 02/18/22 0417 02/19/22  0459   WBC 10.4 8.5 10.5   HGB 13.6 13.2* 13.3*   HCT 41.6 39.9* 39.3*   MCV 85.7 84.0 83.1    297 290     BMP:   Recent Labs     02/17/22  0630 02/18/22  0416 02/19/22  0459    137 137   K 4.5 4.3 4.4    101 99   CO2 22 26 29   BUN 17 17 15   CREATININE 0.6* 0.6* 0.6*     LIVER PROFILE:   Recent Labs     02/17/22 0630 02/18/22 0416 02/19/22  0459   AST 31 33 30   ALT 31 39 52*   BILITOT 0.4 0.4 0.5   ALKPHOS 42 39* 40       Microbiology:  2/14 COVID-19 detected    Imaging:  CXR 2/19  images reviewed by me and showed:   Diffuse bilateral ASD- no changes       CTPA 2/15 imaging reviewed by me and showed  No evidence of pulmonary embolism. Scattered areas of patchy and ground-glass opacities are noted throughout the   bilateral lungs. These are nonspecific but could indicate an atypical/viral   pneumonia, inclusive of COVID-19      ASSESSMENT:  · Acute hypoxemic respiratory failure-  · COVID 19 pneumonia in an unvaccinated individual  · ARDS  · Dry Cough  · Nonsustained V. tach 2/18 resolved with PICC line position adjustment  · DM2  · HTN  · Morbid Obesity  · ADALI on CPAP 14  · Unvaccinated      PLAN:  Vapotherm for life-threatening acute hypoxemic respiratory failure and titrate to maintain SaO2 >92%  CPAP 10 cmH2O QHS and PRN during the day   ICU with closely monitory airways, O2 sat, clinical status, cardiac rhythm, vital signs for possible intubation/MV.  D/W patient    Continuous pulse oximetry  Droplet plus isolation (surgical mask, eye protection, gown, glove)  Supervised self proning  Benzonatate 200 TID and Hycodan as needed  Moved to negative pressure room in case needs aerosolized procedure  Avoid NEBs as able-albuterol MDI strongly preferred   Dexamethasone 6 mg IV day #5-monitor blood glucose closely  Baricitinib day #4  Blood sugar control ISS, with goal 150-180  Lovenox BID           Total critical care time caring for this patient with life threatening, unstable organ failure, including direct patient contact, management of life support systems, review of data including imaging and labs, discussions with other team members and physicians is 32 minutes, excluding procedures.

## 2022-02-19 NOTE — SIGNIFICANT EVENT
Pt had R sided PICC placed today. He is COVID + on VapoTherm. He is having wide tachy arrhythmia when he lays on his R side. He says he can feel a \"poking\" sensation in his central chest and a \"weird feeling\" affecting a wider area across his chest when he lays on his R side. Do not see that post placement imaging was performed. I suspect catheter tipp is either a bit deep or perhaps migrating when he lays on his R side. I feel necessary to see where tip is when laying on R side to make sure it is retracted enough. D/w Dr. Paty Agrawal ([facility]). Will try to obtain portable cross R decub. Also, ordered upright CXR. Since he is fairly consistently going into wide complex tachy when he lays on his R side I remained at bedside for this entire process. Discussed resulting imaging w/ Dr. Paty Agrawal. Catheter tip in lower R atrium upright and possibly deviates into ventricular space when he lays on R side. Rec retract 5-6 cm. PICC procedure: Old dressing removed and securing apparatus removed. Area and exposed catheter cleaned with chlorhexidine and subsequently alcohol swab. Retracted ~ 6 cm. Entire area and exposed PICC cleaned w/ new chlorhexidine swab. Adhesive prep solution applied to area of dressing applied. Adhesive securing device applied and PICC hub secured in the device. Since there is now about 10 cm or so of exposed PICC tubing beyond incision site I coiled a single loop around securing device so this wound be well contained under dressing. This should prevent line from migrating deeper. I used a Full size Tegaderm rather than standard central line dressing to cover more area and make migration less likely. After completed above, pt is now able to lay on R w/ changes on tele and w/o Sx.     Total critical care time caring for this patient with life threatening, unstable organ failure, including direct patient contact, management of life support systems, review of data including imaging and labs, discussions with other team members and physicians is 85 minutes so far today. Above billed as crit time rather than procedure as I did not place a new PICC.

## 2022-02-19 NOTE — PROGRESS NOTES
02/19/22 0043   NIV Type   Equipment Type v60   Mode CPAP   Mask Type Other (Comment)   Mask Size   (b)   Settings/Measurements   CPAP/EPAP 10 cmH2O   Resp 14   FiO2  60 %   Vt Exhaled 672 mL   Mask Leak (lpm) 33 lpm   Comfort Level Good   Using Accessory Muscles No   SpO2 99   Patient Observation   Observations spo2 99% on 60% cpap   Oxygen Therapy/Pulse Ox   SpO2 99 %

## 2022-02-19 NOTE — PROGRESS NOTES
Shift assessment completed as documented on flowsheet. Pt sitting in bed watching tv. Denies pain or discomfort at this time. VSS. Voids per urinal. RT weaning oxygen as tolerated Call light within reach.

## 2022-02-20 LAB
A/G RATIO: 0.9 (ref 1.1–2.2)
ALBUMIN SERPL-MCNC: 3.3 G/DL (ref 3.4–5)
ALP BLD-CCNC: 44 U/L (ref 40–129)
ALT SERPL-CCNC: 57 U/L (ref 10–40)
ANION GAP SERPL CALCULATED.3IONS-SCNC: 10 MMOL/L (ref 3–16)
AST SERPL-CCNC: 28 U/L (ref 15–37)
BASOPHILS ABSOLUTE: 0 K/UL (ref 0–0.2)
BASOPHILS RELATIVE PERCENT: 0 %
BILIRUB SERPL-MCNC: 0.4 MG/DL (ref 0–1)
BUN BLDV-MCNC: 17 MG/DL (ref 7–20)
CALCIUM SERPL-MCNC: 9 MG/DL (ref 8.3–10.6)
CHLORIDE BLD-SCNC: 100 MMOL/L (ref 99–110)
CO2: 26 MMOL/L (ref 21–32)
CREAT SERPL-MCNC: 0.6 MG/DL (ref 0.9–1.3)
EOSINOPHILS ABSOLUTE: 0 K/UL (ref 0–0.6)
EOSINOPHILS RELATIVE PERCENT: 0.1 %
GFR AFRICAN AMERICAN: >60
GFR NON-AFRICAN AMERICAN: >60
GLUCOSE BLD-MCNC: 102 MG/DL (ref 70–99)
GLUCOSE BLD-MCNC: 162 MG/DL (ref 70–99)
GLUCOSE BLD-MCNC: 180 MG/DL (ref 70–99)
GLUCOSE BLD-MCNC: 200 MG/DL (ref 70–99)
GLUCOSE BLD-MCNC: 305 MG/DL (ref 70–99)
HCT VFR BLD CALC: 40 % (ref 40.5–52.5)
HEMOGLOBIN: 13.4 G/DL (ref 13.5–17.5)
LYMPHOCYTES ABSOLUTE: 1.7 K/UL (ref 1–5.1)
LYMPHOCYTES RELATIVE PERCENT: 14.6 %
MCH RBC QN AUTO: 28.1 PG (ref 26–34)
MCHC RBC AUTO-ENTMCNC: 33.5 G/DL (ref 31–36)
MCV RBC AUTO: 83.8 FL (ref 80–100)
MONOCYTES ABSOLUTE: 0.5 K/UL (ref 0–1.3)
MONOCYTES RELATIVE PERCENT: 4.1 %
NEUTROPHILS ABSOLUTE: 9.2 K/UL (ref 1.7–7.7)
NEUTROPHILS RELATIVE PERCENT: 81.2 %
PDW BLD-RTO: 14.5 % (ref 12.4–15.4)
PERFORMED ON: ABNORMAL
PLATELET # BLD: 277 K/UL (ref 135–450)
PMV BLD AUTO: 7.7 FL (ref 5–10.5)
POTASSIUM REFLEX MAGNESIUM: 4.7 MMOL/L (ref 3.5–5.1)
RBC # BLD: 4.78 M/UL (ref 4.2–5.9)
SODIUM BLD-SCNC: 136 MMOL/L (ref 136–145)
TOTAL PROTEIN: 6.8 G/DL (ref 6.4–8.2)
WBC # BLD: 11.3 K/UL (ref 4–11)

## 2022-02-20 PROCEDURE — 94761 N-INVAS EAR/PLS OXIMETRY MLT: CPT

## 2022-02-20 PROCEDURE — 2580000003 HC RX 258: Performed by: INTERNAL MEDICINE

## 2022-02-20 PROCEDURE — 80053 COMPREHEN METABOLIC PANEL: CPT

## 2022-02-20 PROCEDURE — 2060000000 HC ICU INTERMEDIATE R&B

## 2022-02-20 PROCEDURE — 99232 SBSQ HOSP IP/OBS MODERATE 35: CPT | Performed by: INTERNAL MEDICINE

## 2022-02-20 PROCEDURE — 2700000000 HC OXYGEN THERAPY PER DAY

## 2022-02-20 PROCEDURE — 6360000002 HC RX W HCPCS: Performed by: INTERNAL MEDICINE

## 2022-02-20 PROCEDURE — 94660 CPAP INITIATION&MGMT: CPT

## 2022-02-20 PROCEDURE — 6370000000 HC RX 637 (ALT 250 FOR IP): Performed by: INTERNAL MEDICINE

## 2022-02-20 PROCEDURE — 85025 COMPLETE CBC W/AUTO DIFF WBC: CPT

## 2022-02-20 PROCEDURE — 6370000000 HC RX 637 (ALT 250 FOR IP)

## 2022-02-20 PROCEDURE — 99233 SBSQ HOSP IP/OBS HIGH 50: CPT | Performed by: INTERNAL MEDICINE

## 2022-02-20 PROCEDURE — 76942 ECHO GUIDE FOR BIOPSY: CPT

## 2022-02-20 RX ADMIN — ENOXAPARIN SODIUM 40 MG: 100 INJECTION SUBCUTANEOUS at 08:11

## 2022-02-20 RX ADMIN — ENOXAPARIN SODIUM 40 MG: 100 INJECTION SUBCUTANEOUS at 20:11

## 2022-02-20 RX ADMIN — VALSARTAN 320 MG: 80 TABLET, FILM COATED ORAL at 08:12

## 2022-02-20 RX ADMIN — SODIUM CHLORIDE, PRESERVATIVE FREE 10 ML: 5 INJECTION INTRAVENOUS at 08:12

## 2022-02-20 RX ADMIN — Medication 2000 UNITS: at 08:12

## 2022-02-20 RX ADMIN — SODIUM CHLORIDE, PRESERVATIVE FREE 10 ML: 5 INJECTION INTRAVENOUS at 20:12

## 2022-02-20 RX ADMIN — INSULIN LISPRO 2 UNITS: 100 INJECTION, SOLUTION INTRAVENOUS; SUBCUTANEOUS at 20:11

## 2022-02-20 RX ADMIN — BENZONATATE 200 MG: 100 CAPSULE ORAL at 08:12

## 2022-02-20 RX ADMIN — MUPIROCIN: 20 OINTMENT TOPICAL at 08:12

## 2022-02-20 RX ADMIN — BARICITINIB 4 MG: 2 TABLET, FILM COATED ORAL at 08:12

## 2022-02-20 RX ADMIN — OXYCODONE HYDROCHLORIDE AND ACETAMINOPHEN 500 MG: 500 TABLET ORAL at 08:12

## 2022-02-20 RX ADMIN — DEXAMETHASONE SODIUM PHOSPHATE 6 MG: 10 INJECTION INTRAMUSCULAR; INTRAVENOUS at 20:12

## 2022-02-20 RX ADMIN — BENZONATATE 200 MG: 100 CAPSULE ORAL at 20:10

## 2022-02-20 RX ADMIN — MUPIROCIN: 20 OINTMENT TOPICAL at 20:10

## 2022-02-20 ASSESSMENT — PAIN SCALES - GENERAL
PAINLEVEL_OUTOF10: 0
PAINLEVEL_OUTOF10: 0

## 2022-02-20 NOTE — PROGRESS NOTES
Shift assessment completed as documented on flowsheet. VSS Pt awake and alert. Placed on 10L HF oxygen. SPO2 88% while on room air. Voids per urinal. Denies pain or discomfort.  Call light in reach

## 2022-02-20 NOTE — PROGRESS NOTES
airspace   opacities consistent with COVID-19 pneumonia given patient history. 3. Pulmonary vascular congestion and mild cardiomegaly. XR CHEST PORTABLE   Final Result   PICC line noted likely within the 6 cm below the cavoatrial junction on the   AP view. It is conceivable this may be needs to be retracted up to 10 cm. Multifocal pneumonia compatible with COVID. Critical results discussed between Dr. Rosemary Wesley and Dr uDke Winchester on 2/18/2022 -            IR PICC WO SQ PORT/PUMP > 5 YEARS   Final Result   Successful placement of PICC line. CT CHEST PULMONARY EMBOLISM W CONTRAST   Final Result   No evidence of pulmonary embolism. Scattered areas of patchy and ground-glass opacities are noted throughout the   bilateral lungs. These are nonspecific but could indicate an atypical/viral   pneumonia, inclusive of COVID-19. XR CHEST 1 VIEW   Final Result   Scattered multifocal airspace opacities compatible with multifocal pneumonia. Assessment/Plan:    #Acute respiratory failure with hypoxia   #COVID-19 pneumonia   -Unvaccinated patient   -Symptom onset on 2/7 . Patient states he tested positive for COVID 2/11 at PCP appt; we do not have record of this. COVID positive here on 2/14 by PCR . Received 10 day course of doxycycline on 2/11 and completed 4 of 10 days with no improvement in sxs  -Droplet plus precautions, continuous telemetry monitoring, continuous pulse oximetry  -Hypoxic on presentation , not on home oxygen . satting 88% on RA in the ER-> was  3 L O2-> worsening hypoxemia now. -Seen in consultation by pulmonology. -O2 requirement up from 3L-> 12-> 15L--> Vapotherm 70% 40 L--> air Vo 50% 25 L-- 6 L high Flow O2   -Continue Decadron day #6  -Continue baricitinib day #5.   -D dimer elevated at 373 . Chest CT with bilateral GGO, CXR displays multifocal pna.  Neg for PE  -PCT 0.11; hold off on abx for now  -CRP 51.8--> 14.8  -IBD  Tessalon Perles and Hycodan as needed for cough       #DM2  -Hold home meds   -Check Hgb A1c- 7.1  -Med dose SSI  -CC diet     #HTN  -BP elevated  -Resumed home valsartan  -Monitor    #Morbid Obesity   -BMI is 81.9 kg/m²  -Complicating assessment and treatment.    -Placing patient at risk for multiple comorbidities as well as early death and contributing to the patients presentation.   -Patient would benefit from weight loss    #ADALI  -Home CPAP    DVT Prophylaxis: Lovenox  Diet: ADULT DIET;  Regular; 4 carb choices (60 gm/meal)  Code Status: Full Code     Transfer to [facility]    Tristian Mckinney MD  2/20/2022 1:14 PM

## 2022-02-20 NOTE — PROGRESS NOTES
Pulmonary & Critical Care Medicine ICU Progress Note    CC: COVID-19    Events of Last 24 hours:   No V. tach   PICC was removed  8LPM this am   No drips   CPAP overnight - home unit       Vascular lines: IV: PICC -         / / /FiO2 : 50 %  No results for input(s): PHART, SNR8KMP, PO2ART in the last 72 hours. IV:   sodium chloride      dextrose         Vitals:  Blood pressure 115/76, pulse 52, temperature 98 °F (36.7 °C), temperature source Oral, resp. rate 15, height 6' 4\" (1.93 m), weight (!) 322 lb 9.6 oz (146.3 kg), SpO2 95 %. on HFNC   Temp  Av.2 °F (36.8 °C)  Min: 97.8 °F (36.6 °C)  Max: 98.6 °F (37 °C)    Intake/Output Summary (Last 24 hours) at 2022 0730  Last data filed at 2022 0300  Gross per 24 hour   Intake --   Output 900 ml   Net -900 ml     PE:  General: ill appearing    Eyes: PERRL. No sclera icterus. No conjunctival injection. ENT: No discharge. Pharynx clear. Neck: Trachea midline. Normal thyroid. Resp: Mild accessory muscle use. Few crackles. No wheezing. Few rhonchi. No dullness on percussion. CV: Regular rate. Regular rhythm. No mumur or rub. No edema. GI: Non-tender. Non-distended. No masses. No organomegaly. Normal bowel sounds. No hernia. Skin: Warm and dry. No nodule on exposed extremities. No rash on exposed extremities. Lymph: No cervical LAD. No supraclavicular LAD. M/S: No cyanosis. No joint deformity. No clubbing. Neuro: AAOx3. Followed commands.    Psych: No agitation, no anxiety, affect is full     Scheduled Meds:   lidocaine 1 % injection  5 mL IntraDERmal Once    enoxaparin  40 mg SubCUTAneous BID    mupirocin   Nasal BID    benzonatate  200 mg Oral TID    sodium chloride flush  5-40 mL IntraVENous 2 times per day    valsartan  320 mg Oral Daily    insulin lispro  0-12 Units SubCUTAneous TID WC    insulin lispro  0-6 Units SubCUTAneous Nightly    baricitinib  4 mg Oral Daily    dexamethasone  6 mg IntraVENous Q24H    Vitamin D 2,000 Units Oral Daily    ascorbic acid  500 mg Oral Daily       Data:  CBC:   Recent Labs     02/18/22 0417 02/19/22 0459 02/20/22  0430   WBC 8.5 10.5 11.3*   HGB 13.2* 13.3* 13.4*   HCT 39.9* 39.3* 40.0*   MCV 84.0 83.1 83.8    290 277     BMP:   Recent Labs     02/18/22 0416 02/19/22 0459 02/20/22  0430    137 136   K 4.3 4.4 4.7    99 100   CO2 26 29 26   BUN 17 15 17   CREATININE 0.6* 0.6* 0.6*     LIVER PROFILE:   Recent Labs     02/18/22 0416 02/19/22 0459 02/20/22  0430   AST 33 30 28   ALT 39 52* 57*   BILITOT 0.4 0.5 0.4   ALKPHOS 39* 44 44       Microbiology:  2/14 COVID-19 detected    Imaging:  CXR 2/19  images reviewed by me and showed:   Diffuse bilateral ASD- no changes       CTPA 2/15 imaging reviewed by me and showed  No evidence of pulmonary embolism. Scattered areas of patchy and ground-glass opacities are noted throughout the   bilateral lungs.  These are nonspecific but could indicate an atypical/viral   pneumonia, inclusive of COVID-19      ASSESSMENT:  · Acute hypoxemic respiratory failure-  · COVID 19 pneumonia in an unvaccinated individual  · ARDS  · Dry Cough  · Nonsustained V. tach 2/18 resolved with PICC line position adjustment  · DM2  · HTN  · Morbid Obesity  · ADALI on CPAP 14  · Unvaccinated      PLAN:  HFNC for life-threatening acute hypoxemic respiratory failure and titrate to maintain SaO2 >92%  CPAP 10 cmH2O QHS and PRN during the day   Continuous pulse oximetry  Droplet plus isolation (surgical mask, eye protection, gown, glove)  Supervised self proning  Benzonatate 200 TID and Hycodan as needed  Moved to negative pressure room in case needs aerosolized procedure  Avoid NEBs as able-albuterol MDI strongly preferred   Dexamethasone 6 mg IV day #6 -monitor blood glucose closely  Baricitinib day #5  Blood sugar control ISS, with goal 150-180  Lovenox BID   Okay to move out of the ICU from our perspective

## 2022-02-21 LAB
A/G RATIO: 1 (ref 1.1–2.2)
ALBUMIN SERPL-MCNC: 3.4 G/DL (ref 3.4–5)
ALP BLD-CCNC: 42 U/L (ref 40–129)
ALT SERPL-CCNC: 59 U/L (ref 10–40)
ANION GAP SERPL CALCULATED.3IONS-SCNC: 9 MMOL/L (ref 3–16)
AST SERPL-CCNC: 26 U/L (ref 15–37)
BASOPHILS ABSOLUTE: 0 K/UL (ref 0–0.2)
BASOPHILS RELATIVE PERCENT: 0.1 %
BILIRUB SERPL-MCNC: 0.4 MG/DL (ref 0–1)
BUN BLDV-MCNC: 19 MG/DL (ref 7–20)
CALCIUM SERPL-MCNC: 9.3 MG/DL (ref 8.3–10.6)
CHLORIDE BLD-SCNC: 100 MMOL/L (ref 99–110)
CO2: 27 MMOL/L (ref 21–32)
CREAT SERPL-MCNC: 0.7 MG/DL (ref 0.9–1.3)
EOSINOPHILS ABSOLUTE: 0 K/UL (ref 0–0.6)
EOSINOPHILS RELATIVE PERCENT: 0.3 %
GFR AFRICAN AMERICAN: >60
GFR NON-AFRICAN AMERICAN: >60
GLUCOSE BLD-MCNC: 103 MG/DL (ref 70–99)
GLUCOSE BLD-MCNC: 112 MG/DL (ref 70–99)
GLUCOSE BLD-MCNC: 127 MG/DL (ref 70–99)
GLUCOSE BLD-MCNC: 128 MG/DL (ref 70–99)
GLUCOSE BLD-MCNC: 144 MG/DL (ref 70–99)
GLUCOSE BLD-MCNC: 176 MG/DL (ref 70–99)
HCT VFR BLD CALC: 41.6 % (ref 40.5–52.5)
HEMOGLOBIN: 13.8 G/DL (ref 13.5–17.5)
LYMPHOCYTES ABSOLUTE: 1.7 K/UL (ref 1–5.1)
LYMPHOCYTES RELATIVE PERCENT: 14.3 %
MCH RBC QN AUTO: 27.8 PG (ref 26–34)
MCHC RBC AUTO-ENTMCNC: 33.2 G/DL (ref 31–36)
MCV RBC AUTO: 83.8 FL (ref 80–100)
MONOCYTES ABSOLUTE: 0.6 K/UL (ref 0–1.3)
MONOCYTES RELATIVE PERCENT: 4.8 %
NEUTROPHILS ABSOLUTE: 9.6 K/UL (ref 1.7–7.7)
NEUTROPHILS RELATIVE PERCENT: 80.5 %
PDW BLD-RTO: 14.5 % (ref 12.4–15.4)
PERFORMED ON: ABNORMAL
PLATELET # BLD: 270 K/UL (ref 135–450)
PMV BLD AUTO: 8 FL (ref 5–10.5)
POTASSIUM REFLEX MAGNESIUM: 4.8 MMOL/L (ref 3.5–5.1)
RBC # BLD: 4.96 M/UL (ref 4.2–5.9)
SODIUM BLD-SCNC: 136 MMOL/L (ref 136–145)
TOTAL PROTEIN: 6.8 G/DL (ref 6.4–8.2)
WBC # BLD: 11.9 K/UL (ref 4–11)

## 2022-02-21 PROCEDURE — 85025 COMPLETE CBC W/AUTO DIFF WBC: CPT

## 2022-02-21 PROCEDURE — 99233 SBSQ HOSP IP/OBS HIGH 50: CPT | Performed by: INTERNAL MEDICINE

## 2022-02-21 PROCEDURE — 2060000000 HC ICU INTERMEDIATE R&B

## 2022-02-21 PROCEDURE — 2580000003 HC RX 258: Performed by: INTERNAL MEDICINE

## 2022-02-21 PROCEDURE — 6370000000 HC RX 637 (ALT 250 FOR IP): Performed by: INTERNAL MEDICINE

## 2022-02-21 PROCEDURE — 6360000002 HC RX W HCPCS: Performed by: INTERNAL MEDICINE

## 2022-02-21 PROCEDURE — 94761 N-INVAS EAR/PLS OXIMETRY MLT: CPT

## 2022-02-21 PROCEDURE — 80053 COMPREHEN METABOLIC PANEL: CPT

## 2022-02-21 PROCEDURE — 97530 THERAPEUTIC ACTIVITIES: CPT

## 2022-02-21 PROCEDURE — 99232 SBSQ HOSP IP/OBS MODERATE 35: CPT | Performed by: PHYSICIAN ASSISTANT

## 2022-02-21 PROCEDURE — 97166 OT EVAL MOD COMPLEX 45 MIN: CPT

## 2022-02-21 PROCEDURE — 2700000000 HC OXYGEN THERAPY PER DAY

## 2022-02-21 PROCEDURE — 97535 SELF CARE MNGMENT TRAINING: CPT

## 2022-02-21 PROCEDURE — 6370000000 HC RX 637 (ALT 250 FOR IP)

## 2022-02-21 RX ORDER — FUROSEMIDE 10 MG/ML
20 INJECTION INTRAMUSCULAR; INTRAVENOUS ONCE
Status: COMPLETED | OUTPATIENT
Start: 2022-02-21 | End: 2022-02-21

## 2022-02-21 RX ADMIN — DEXAMETHASONE SODIUM PHOSPHATE 6 MG: 10 INJECTION INTRAMUSCULAR; INTRAVENOUS at 20:56

## 2022-02-21 RX ADMIN — FUROSEMIDE 20 MG: 10 INJECTION, SOLUTION INTRAMUSCULAR; INTRAVENOUS at 11:54

## 2022-02-21 RX ADMIN — SODIUM CHLORIDE, PRESERVATIVE FREE 10 ML: 5 INJECTION INTRAVENOUS at 20:56

## 2022-02-21 RX ADMIN — ENOXAPARIN SODIUM 40 MG: 100 INJECTION SUBCUTANEOUS at 09:08

## 2022-02-21 RX ADMIN — VALSARTAN 320 MG: 80 TABLET, FILM COATED ORAL at 09:08

## 2022-02-21 RX ADMIN — ENOXAPARIN SODIUM 40 MG: 100 INJECTION SUBCUTANEOUS at 20:56

## 2022-02-21 RX ADMIN — SODIUM CHLORIDE, PRESERVATIVE FREE 10 ML: 5 INJECTION INTRAVENOUS at 09:09

## 2022-02-21 RX ADMIN — BARICITINIB 4 MG: 2 TABLET, FILM COATED ORAL at 09:08

## 2022-02-21 RX ADMIN — BENZONATATE 200 MG: 100 CAPSULE ORAL at 20:56

## 2022-02-21 RX ADMIN — Medication 2000 UNITS: at 09:08

## 2022-02-21 RX ADMIN — OXYCODONE HYDROCHLORIDE AND ACETAMINOPHEN 500 MG: 500 TABLET ORAL at 09:08

## 2022-02-21 RX ADMIN — BENZONATATE 200 MG: 100 CAPSULE ORAL at 15:36

## 2022-02-21 RX ADMIN — BENZONATATE 200 MG: 100 CAPSULE ORAL at 09:08

## 2022-02-21 ASSESSMENT — PAIN SCALES - GENERAL
PAINLEVEL_OUTOF10: 0

## 2022-02-21 NOTE — FLOWSHEET NOTE
02/21/22 1630   Vital Signs   Temp 97 °F (36.1 °C)   Temp Source Oral   Pulse 83   Heart Rate Source Monitor   Resp 19   /76   BP Location Left upper arm   Patient Position Semi fowlers   Level of Consciousness Alert (0)   MEWS Score 1   Patient Currently in Pain Denies   Pain Assessment   Pain Assessment 0-10   Pain Level 0   Patient's Stated Pain Goal No pain   Oxygen Therapy   SpO2 92 %   Pulse Oximeter Device Mode Continuous   O2 Device Nasal cannula   O2 Flow Rate (L/min) 2 L/min       Pt transferred from [facility] to PCU. Pt O/A x4. Oriented to the room and call light system. Orders released. Pt denies any other care needs at this time.     Brynn De La Vega RN

## 2022-02-21 NOTE — PROGRESS NOTES
Inpatient Occupational Therapy  Evaluation and Treatment    Unit: 3 [facility]  Date:  2/21/2022  Patient Name:    Christopher Rangel  Admitting diagnosis:  Acute respiratory failure with hypoxia ([facility] [address] [J96.01]  COVID-19 [U07.1]  Acute respiratory failure due to COVID-19 [facility]) [U07.1, J96.00]  Admit Date:  2/14/2022  Precautions/Restrictions/WB Status/ Lines/ Wounds/ Oxygen: Lines -Supplemental O2 (6), Telemetry and Continuous pulse oximetry     Transferred to ICU on 2-17-22 due to worsening resp status- on vapotherm      Treatment Time:  6999-1040  Treatment Number: 1   Timed code treatment minutes 25minutes   Total Treatment minutes:   35   minutes    Patient Goals for Therapy:  \" go home \"      Discharge Recommendations: Home PRN assist   DME needs for discharge: Needs Met       Therapy recommendations for staff: Independent with use of No AD for all ambulation within room    History of Present Illness: H&P per Zackary RODRIGUEZ 2-21-22  51 y. o. male with PMH below, presents with fatigue, myalgias, SOB, cough, COVID +, HA, decreased appetite/PO intake.  PT reports COVID like Sx which started ~7d ago. Azulhung Tamez was Dx at his PCP's office 3 d ago but we are unable to verify this result.  Tested positive in ED by Dhara Angulo was started on Augmentin, alb INH, prednisone by his PCP 3 days ago. All of the above Sx are progressively worsening  Home Health S4 Level Recommendation:  NA  AM-PAC Score: 24    Preadmission Environment    Pt. Lives Alone works at his [employer]. environment:  two story home- bed room upstairs  Steps to enter first floor: 2-3 with railing  steps to enter  Steps to second floor: full flight - with railing   Bathroom: tub/shower unit and standard height commode,hald bath on first floor - standard height commode   Equipment owned: none    Preadmission Status:  Pt. Able to drive: Yes  Pt Fully independent with ADLs: Yes  Pt. Required assistance from family for: Cleaning lady comes every two weeks   Pt. independent for transfers and gait and walked with No Device  History of falls No    Pain  No        Cognition    A&O x4   Able to follow 2 step commands    Subjective  Patient lying supine in bed with no family present. Pt agreeable to this OT eval & tx. Upper Extremity ROM:    WFL    Upper Extremity Strength:    WFL    Upper Extremity Sensation    WFL    Upper Extremity Proprioception:  WFL    Coordination and Tone  WFL    Balance  Functional Sitting Balance:  WFL  Functional Standing Balance:WFL    Bed mobility:    Supine to sit: Independent  Sit to supine:   Independent  Rolling:    NT  Scooting in sitting:  Not Tested  Scooting to head of bed:   Not Tested    Bridging:   Not Tested    Transfers:    Sit to stand:  Independent  Stand to sit: Independent  Bed to chair:   Independent  Standard toilet: Not Tested  Bed to [facility]:  Not Tested    Dressing:      UE:   Not Tested  LE:    Independent donning and doffing socks, declined donning pants    Bathing:    UE:  Not Tested  LE:  Not Tested    Eating:   Independent    Toileting:  Not Tested    Activity Tolerance   Pt completed therapy session with No adverse symptoms noted w/activity   Pt on 6 liters    Sp02 96% and HR 71  In bed   Sp02 97%  HR 72 after ambulated around bed     Positioning Needs:   Pt up in chair, no alarm needed, positioned in proper neutral alignment and pressure relief provided. Exercise / Activities Initiated:   N/A    Patient/Family Education:   Role of OT  Instructed in shower chair if pt feels he needs one when he is D/C   Instructed the pt in the use of energy conservation    Assessment of Deficits: Pt seen for Occupational therapy evaluation in acute care setting.    Goal(s) :   No goals to be made due to pt does not require OT services     Plan:   D/C OT services    Lovely Coughlin OTR/L [id number]          If patient discharges from this facility prior to next visit, this note will serve as the Discharge Summary

## 2022-02-21 NOTE — PROGRESS NOTES
Shift assessment complete, see doc flowsheets patient resting quietly at this time. Vital signs stable, will continue to monitor.

## 2022-02-21 NOTE — PROGRESS NOTES
BARICITINIB MONITORING DAY 6/14    Substantial increase in LFTs? no   Hemoglobin <8.0 g/dL? no   ANC <500 cells/mcL? no   ALC <200 cells/mcL? no     Dose: 4mg daily  End date: 3/1     Pharmacy will continue to monitor.

## 2022-02-21 NOTE — PLAN OF CARE
Problem: Airway Clearance - Ineffective  Goal: Achieve or maintain patent airway  Outcome: Ongoing     Problem: Gas Exchange - Impaired  Goal: Absence of hypoxia  Outcome: Ongoing  Goal: Promote optimal lung function  Outcome: Ongoing     Problem: Breathing Pattern - Ineffective  Goal: Ability to achieve and maintain a regular respiratory rate  Outcome: Ongoing     Problem:  Body Temperature -  Risk of, Imbalanced  Goal: Ability to maintain a body temperature within defined limits  Outcome: Ongoing  Goal: Will regain or maintain usual level of consciousness  Outcome: Ongoing  Goal: Complications related to the disease process, condition or treatment will be avoided or minimized  Outcome: Ongoing     Problem: Isolation Precautions - Risk of Spread of Infection  Goal: Prevent transmission of infection  Outcome: Ongoing     Problem: Nutrition Deficits  Goal: Optimize nutritional status  Outcome: Ongoing     Problem: Risk for Fluid Volume Deficit  Goal: Maintain normal heart rhythm  Outcome: Ongoing  Goal: Maintain absence of muscle cramping  Outcome: Ongoing  Goal: Maintain normal serum potassium, sodium, calcium, phosphorus, and pH  Outcome: Ongoing     Problem: Loneliness or Risk for Loneliness  Goal: Demonstrate positive use of time alone when socialization is not possible  Outcome: Ongoing     Problem: Fatigue  Goal: Verbalize increase energy and improved vitality  Outcome: Ongoing     Problem: Patient Education: Go to Patient Education Activity  Goal: Patient/Family Education  Outcome: Ongoing     Problem: Infection:  Goal: Will remain free from infection  Description: Will remain free from infection  Outcome: Ongoing     Problem: Safety:  Goal: Free from accidental physical injury  Description: Free from accidental physical injury  Outcome: Ongoing  Goal: Free from intentional harm  Description: Free from intentional harm  Outcome: Ongoing     Problem: Daily Care:  Goal: Daily care needs are met  Description: Daily care needs are met  Outcome: Ongoing     Problem: Pain:  Goal: Patient's pain/discomfort is manageable  Description: Patient's pain/discomfort is manageable  Outcome: Ongoing     Problem: Skin Integrity:  Goal: Skin integrity will stabilize  Description: Skin integrity will stabilize  Outcome: Ongoing     Problem: Discharge Planning:  Goal: Patients continuum of care needs are met  Description: Patients continuum of care needs are met  Outcome: Ongoing     Problem: Infection - Central Venous Catheter-Associated Bloodstream Infection:  Goal: Will show no infection signs and symptoms  Description: Will show no infection signs and symptoms  Outcome: Ongoing

## 2022-02-21 NOTE — PROGRESS NOTES
Pulmonary & Critical Care Medicine ICU Progress Note    CC: COVID-19    Events of Last 24 hours:   Continues to improve  Titrated O2 to 6 L for saturation of 96%  Use his home CPAP which was checked by me today. Patient is on APAP 5-20 with 100% compliance average use 6 hours a night with an AHI of 1.2. Average pressure approximately about 10. Vascular lines: IV: PICC -         / / /FiO2 : 50 %  No results for input(s): PHART, UKQ3CFE, PO2ART in the last 72 hours. IV:   sodium chloride      dextrose         Vitals:  Blood pressure 111/63, pulse 55, temperature 97.4 °F (36.3 °C), temperature source Oral, resp. rate 18, height 6' 4\" (1.93 m), weight (!) 340 lb (154.2 kg), SpO2 99 %. on HFNC   Temp  Av °F (36.7 °C)  Min: 97.4 °F (36.3 °C)  Max: 98.3 °F (36.8 °C)    Intake/Output Summary (Last 24 hours) at 2022 0739  Last data filed at 2022 0645  Gross per 24 hour   Intake --   Output 800 ml   Net -800 ml     PE:  General: ill appearing    Eyes: PERRL. No sclera icterus. No conjunctival injection. ENT: No discharge. Pharynx clear. Neck: Trachea midline. Normal thyroid. Resp: Mild accessory muscle use. Minimal crackles. No wheezing. No rhonchi. No dullness on percussion. CV: Regular rate. Regular rhythm. No mumur or rub. No edema. GI: Non-tender. Non-distended. No masses. No organomegaly. Normal bowel sounds. No hernia. Skin: Warm and dry. No nodule on exposed extremities. No rash on exposed extremities. Lymph: No cervical LAD. No supraclavicular LAD. M/S: No cyanosis. No joint deformity. No clubbing. Neuro: AAOx3. Followed commands.    Psych: No agitation, no anxiety, affect is full     Scheduled Meds:   lidocaine 1 % injection  5 mL IntraDERmal Once    enoxaparin  40 mg SubCUTAneous BID    mupirocin   Nasal BID    benzonatate  200 mg Oral TID    sodium chloride flush  5-40 mL IntraVENous 2 times per day    valsartan  320 mg Oral Daily    insulin lispro  0-12 Units SubCUTAneous TID WC    insulin lispro  0-6 Units SubCUTAneous Nightly    baricitinib  4 mg Oral Daily    dexamethasone  6 mg IntraVENous Q24H    Vitamin D  2,000 Units Oral Daily    ascorbic acid  500 mg Oral Daily       Data:  CBC:   Recent Labs     02/19/22 0459 02/20/22 0430 02/21/22  0440   WBC 10.5 11.3* 11.9*   HGB 13.3* 13.4* 13.8   HCT 39.3* 40.0* 41.6   MCV 83.1 83.8 83.8    277 270     BMP:   Recent Labs     02/19/22 0459 02/20/22  0430 02/21/22  0440    136 136   K 4.4 4.7 4.8   CL 99 100 100   CO2 29 26 27   BUN 15 17 19   CREATININE 0.6* 0.6* 0.7*     LIVER PROFILE:   Recent Labs     02/19/22 0459 02/20/22 0430 02/21/22  0440   AST 30 28 26   ALT 52* 57* 59*   BILITOT 0.5 0.4 0.4   ALKPHOS 24 16 82       Microbiology:  2/14 COVID-19 detected    Imaging:  CXR 2/19  images reviewed by me and showed:   Diffuse bilateral ASD- no changes       CTPA 2/15 imaging reviewed by me and showed  No evidence of pulmonary embolism. Scattered areas of patchy and ground-glass opacities are noted throughout the   bilateral lungs. These are nonspecific but could indicate an atypical/viral   pneumonia, inclusive of COVID-19      ASSESSMENT:  · Acute hypoxemic respiratory failure-  · COVID 19 pneumonia in an unvaccinated individual  · ARDS  · Dry Cough  · Nonsustained V. tach 2/18 resolved with PICC line position adjustment  · DM2  · HTN  · Morbid Obesity  · ADALI on APAP 5-20 cmH2O.  Changed 2/21 by me to 8-12 cmH2O.  · Unvaccinated for COVID-19     PLAN:  Continue HFNC for life-threatening acute hypoxemic respiratory failure and titrate to maintain SaO2 >92%  I change APAP 8-12 cmH2O to be used nightly   Continuous pulse oximetry  Droplet plus isolation (surgical mask, eye protection, gown, glove)  Supervised self proning  Continue benzonatate 200 TID and Hycodan as needed  Moved to negative pressure room in case needs aerosolized procedure  Avoid NEBs as able-albuterol MDI strongly preferred Dexamethasone 6 mg IV day #7 -monitor blood glucose closely  Baricitinib day #6  Lasix 20 IV x1  Blood sugar control ISS, with goal 150-180  Lovenox BID

## 2022-02-21 NOTE — PROGRESS NOTES
9228  Shift assessment, completed, see flow sheet. Pt is alert and oriented x4. Following commands. SR 78, /62, SpO2 97%. Respirations are easy, even, and unlabored. Bilateral lung sounds are diminished throughout. Took PO pills this AM and ate breakfast w/o difficulty. Denies any pain/discomfort/needs at this time. PIV to L hand remains patent with site and dressing C/D/I, remains NS locked and capped. Pt continues to utilize urinal at bedside as needed. Call light within reach. Bed in lowest position. Will continue to monitor.

## 2022-02-21 NOTE — PROGRESS NOTES
Progress Note    Admit Date:  2/14/2022    HPI: 45 y.o. male with PMH below, presents with fatigue, myalgias, SOB, cough, COVID +, HA, decreased appetite/PO intake. PT reports COVID like Sx which started ~7d ago. He was Dx at his PCP's office 3 d ago but we are unable to verify this result. Tested positive in ED by PCR. He was started on Augmentin, alb INH, prednisone by his PCP 3 days ago. All of the above Sx are progressively worsening. Subjective:  Mr. Alisha Maciel seen resting in bed. He is on 6L NC O2. He feels much better. He is anxious to be discharge home   - no significant nursing events overnight     Objective:     /62   Pulse 78   Temp 98.3 °F (36.8 °C) (Oral)   Resp 18   Ht 6' 4\" (1.93 m)   Wt (!) 340 lb (154.2 kg)   SpO2 99%   BMI 41.39 kg/m²          Intake/Output Summary (Last 24 hours) at 2/21/2022 1324  Last data filed at 2/21/2022 1319  Gross per 24 hour   Intake --   Output 1300 ml   Net -1300 ml       Physical Exam:  Patient in droplet plus precautions  Awake alert and well-oriented. Eyes: PERRL. No sclera icterus. No conjunctival injection. ENT: No discharge. Pharynx clear. Neck: Trachea midline. Resp: No accessory muscle use. No wheezes rales or rhonchi  CV: Regular rate. Regular rhythm. No murmur. No rub. No edema. GI: Non-tender. Non-distended. Normal bowel sounds. Skin: Warm and dry. No nodule on exposed extremities. No rash on exposed extremities. M/S: No cyanosis. No joint deformity. No clubbing. Neuro: Awake. Grossly nonfocal    Psych: Oriented x 3. No agitation.        Scheduled Meds:   lidocaine 1 % injection  5 mL IntraDERmal Once    enoxaparin  40 mg SubCUTAneous BID    mupirocin   Nasal BID    benzonatate  200 mg Oral TID    sodium chloride flush  5-40 mL IntraVENous 2 times per day    valsartan  320 mg Oral Daily    insulin lispro  0-12 Units SubCUTAneous TID WC    insulin lispro  0-6 Units SubCUTAneous Nightly    baricitinib  4 mg Oral Daily    dexamethasone  6 mg IntraVENous Q24H    Vitamin D  2,000 Units Oral Daily    ascorbic acid  500 mg Oral Daily       Continuous Infusions:   sodium chloride      dextrose         PRN Meds:  sodium chloride flush, sodium chloride, HYDROcodone-homatropine, albuterol sulfate HFA **AND** ipratropium, albuterol sulfate HFA, glucose, glucagon (rDNA), dextrose, ondansetron **OR** ondansetron, polyethylene glycol, acetaminophen **OR** acetaminophen, guaiFENesin-dextromethorphan, dextrose bolus (hypoglycemia) **OR** dextrose bolus (hypoglycemia)      Data:  CBC:   Recent Labs     02/19/22 0459 02/20/22  0430 02/21/22  0440   WBC 10.5 11.3* 11.9*   HGB 13.3* 13.4* 13.8   HCT 39.3* 40.0* 41.6   MCV 83.1 83.8 83.8    277 270     BMP:   Recent Labs     02/19/22 0459 02/20/22 0430 02/21/22  0440    136 136   K 4.4 4.7 4.8   CL 99 100 100   CO2 29 26 27   BUN 15 17 19   CREATININE 0.6* 0.6* 0.7*     LIVER PROFILE:   Recent Labs     02/19/22 0459 02/20/22 0430 02/21/22  0440   AST 30 28 26   ALT 52* 57* 59*   BILITOT 0.5 0.4 0.4   ALKPHOS 44 44 42       CULTURES  Results for Margarita Aceves (MRN [id number]) as of 2/16/2022 12:27   Ref. Range 2/14/2022 21:25   INFLUENZA A Latest Ref Range: NOT DETECTED  NOT DETECTED   INFLUENZA B Latest Ref Range: NOT DETECTED  NOT DETECTED   SARS-CoV-2 RNA, RT PCR Latest Ref Range: NOT DETECTED  DETECTED (A)        RADIOLOGY  XR CHEST PORTABLE   Final Result   1. Retraction of the right upper extremity PICC with the tip now in the lower   superior vena cava. 2. No significant change in subpleural, basilar predominant airspace   opacities consistent with COVID-19 pneumonia given patient history. 3. Pulmonary vascular congestion and mild cardiomegaly. XR CHEST PORTABLE   Final Result   PICC line noted likely within the 6 cm below the cavoatrial junction on the   AP view. It is conceivable this may be needs to be retracted up to 10 cm.    Multifocal pneumonia compatible with COVID. Critical results discussed between Dr. Bassem Sandoval and Dr Abhay Trejo on 2/18/2022 -            IR PICC WO SQ PORT/PUMP > 5 YEARS   Final Result   Successful placement of PICC line. CT CHEST PULMONARY EMBOLISM W CONTRAST   Final Result   No evidence of pulmonary embolism. Scattered areas of patchy and ground-glass opacities are noted throughout the   bilateral lungs. These are nonspecific but could indicate an atypical/viral   pneumonia, inclusive of COVID-19. XR CHEST 1 VIEW   Final Result   Scattered multifocal airspace opacities compatible with multifocal pneumonia. Assessment/Plan:    #COVID 19 Pneumonia   #Acute hypoxic respiratory failure  - droplet plus isolation in place   - unvaccinated   - symptom onset 2/7/22  - does not use home O2, currently requiring 6L NC. He required airvo for life threatening respiratory failure, now much improved   - Decadron D#7  - Baricitinib D#6   - Lasix IV 20 mg x 1 today 2/21 per pulm   - supportive care     #Nonsustained [facility] on 2/18/22 resolved with PICC line position adjustment  - remains on tele  - picc has now been removed    #DM2  -Hold home meds   -Checked Hgb A1c- 7.1%  -Med dose SSI  -CC diet     #HTN  -BP stable on home valsartan    #Morbid Obesity   -BMI is 20.9 kg/m²  -Complicating assessment and treatment.    -Placing patient at risk for multiple comorbidities as well as early death and contributing to the patients presentation.   -Patient would benefit from weight loss    #ADALI  -Home CPAP continued     DVT Prophylaxis: Lovenox 40 mg BID   Diet: ADULT DIET;  Regular; 4 carb choices (60 gm/meal)  Code Status: Full Code     Dispo: cont care, wean o2 as able     Kobe Lopez PA-C  2/21/2022 1:29 PM

## 2022-02-22 VITALS
RESPIRATION RATE: 16 BRPM | HEART RATE: 62 BPM | HEIGHT: 76 IN | BODY MASS INDEX: 38.36 KG/M2 | TEMPERATURE: 97 F | OXYGEN SATURATION: 92 % | SYSTOLIC BLOOD PRESSURE: 134 MMHG | WEIGHT: 315 LBS | DIASTOLIC BLOOD PRESSURE: 72 MMHG

## 2022-02-22 LAB
A/G RATIO: 1.1 (ref 1.1–2.2)
ALBUMIN SERPL-MCNC: 3.6 G/DL (ref 3.4–5)
ALP BLD-CCNC: 43 U/L (ref 40–129)
ALT SERPL-CCNC: 72 U/L (ref 10–40)
ANION GAP SERPL CALCULATED.3IONS-SCNC: 12 MMOL/L (ref 3–16)
ANISOCYTOSIS: ABNORMAL
AST SERPL-CCNC: 31 U/L (ref 15–37)
BANDED NEUTROPHILS RELATIVE PERCENT: 1 % (ref 0–7)
BASOPHILS ABSOLUTE: 0 K/UL (ref 0–0.2)
BASOPHILS RELATIVE PERCENT: 0 %
BILIRUB SERPL-MCNC: 0.5 MG/DL (ref 0–1)
BUN BLDV-MCNC: 24 MG/DL (ref 7–20)
CALCIUM SERPL-MCNC: 9.1 MG/DL (ref 8.3–10.6)
CHLORIDE BLD-SCNC: 98 MMOL/L (ref 99–110)
CO2: 24 MMOL/L (ref 21–32)
CREAT SERPL-MCNC: 0.7 MG/DL (ref 0.9–1.3)
EOSINOPHILS ABSOLUTE: 0 K/UL (ref 0–0.6)
EOSINOPHILS RELATIVE PERCENT: 0 %
GFR AFRICAN AMERICAN: >60
GFR NON-AFRICAN AMERICAN: >60
GLUCOSE BLD-MCNC: 104 MG/DL (ref 70–99)
GLUCOSE BLD-MCNC: 151 MG/DL (ref 70–99)
GLUCOSE BLD-MCNC: 169 MG/DL (ref 70–99)
HCT VFR BLD CALC: 40.6 % (ref 40.5–52.5)
HEMOGLOBIN: 13.8 G/DL (ref 13.5–17.5)
LYMPHOCYTES ABSOLUTE: 1.5 K/UL (ref 1–5.1)
LYMPHOCYTES RELATIVE PERCENT: 16 %
MCH RBC QN AUTO: 28.7 PG (ref 26–34)
MCHC RBC AUTO-ENTMCNC: 34.1 G/DL (ref 31–36)
MCV RBC AUTO: 84.4 FL (ref 80–100)
MONOCYTES ABSOLUTE: 0 K/UL (ref 0–1.3)
MONOCYTES RELATIVE PERCENT: 0 %
NEUTROPHILS ABSOLUTE: 7.9 K/UL (ref 1.7–7.7)
NEUTROPHILS RELATIVE PERCENT: 83 %
PDW BLD-RTO: 14.2 % (ref 12.4–15.4)
PERFORMED ON: ABNORMAL
PERFORMED ON: ABNORMAL
PLATELET # BLD: 262 K/UL (ref 135–450)
PLATELET SLIDE REVIEW: ADEQUATE
PMV BLD AUTO: 8.2 FL (ref 5–10.5)
POIKILOCYTES: ABNORMAL
POLYCHROMASIA: ABNORMAL
POTASSIUM REFLEX MAGNESIUM: 4.7 MMOL/L (ref 3.5–5.1)
RBC # BLD: 4.81 M/UL (ref 4.2–5.9)
SLIDE REVIEW: ABNORMAL
SODIUM BLD-SCNC: 134 MMOL/L (ref 136–145)
TOTAL PROTEIN: 7 G/DL (ref 6.4–8.2)
WBC # BLD: 9.4 K/UL (ref 4–11)

## 2022-02-22 PROCEDURE — 94761 N-INVAS EAR/PLS OXIMETRY MLT: CPT

## 2022-02-22 PROCEDURE — 6370000000 HC RX 637 (ALT 250 FOR IP): Performed by: INTERNAL MEDICINE

## 2022-02-22 PROCEDURE — 85025 COMPLETE CBC W/AUTO DIFF WBC: CPT

## 2022-02-22 PROCEDURE — 99233 SBSQ HOSP IP/OBS HIGH 50: CPT | Performed by: INTERNAL MEDICINE

## 2022-02-22 PROCEDURE — 80053 COMPREHEN METABOLIC PANEL: CPT

## 2022-02-22 PROCEDURE — 2700000000 HC OXYGEN THERAPY PER DAY

## 2022-02-22 PROCEDURE — 97161 PT EVAL LOW COMPLEX 20 MIN: CPT

## 2022-02-22 PROCEDURE — 97116 GAIT TRAINING THERAPY: CPT

## 2022-02-22 PROCEDURE — 99239 HOSP IP/OBS DSCHRG MGMT >30: CPT | Performed by: INTERNAL MEDICINE

## 2022-02-22 PROCEDURE — 36415 COLL VENOUS BLD VENIPUNCTURE: CPT

## 2022-02-22 RX ORDER — DEXAMETHASONE 6 MG/1
6 TABLET ORAL
Qty: 1 TABLET | Refills: 0 | Status: SHIPPED | OUTPATIENT
Start: 2022-02-22 | End: 2022-02-23

## 2022-02-22 RX ADMIN — Medication 2000 UNITS: at 09:14

## 2022-02-22 RX ADMIN — BARICITINIB 4 MG: 2 TABLET, FILM COATED ORAL at 09:14

## 2022-02-22 RX ADMIN — OXYCODONE HYDROCHLORIDE AND ACETAMINOPHEN 500 MG: 500 TABLET ORAL at 09:14

## 2022-02-22 RX ADMIN — VALSARTAN 320 MG: 80 TABLET, FILM COATED ORAL at 09:14

## 2022-02-22 NOTE — PROGRESS NOTES
Bedside report and transfer of care given to [facility] CELSO BELLAMY RN. Pt currently resting in bed with the call light within reach. Pt denies any other care needs at this time. Pt stable at this time.     Merle Rosas RN

## 2022-02-22 NOTE — PROGRESS NOTES
Patient educated on discharge instructions as well as new medications use, dosage, administration and possible side effects. Patient verified knowledge. IV removed without difficulty and dry dressing in place. Telemetry monitor removed and returned to [facility]. Pt left facility in stable condition to Home with all of their personal belongings.

## 2022-02-22 NOTE — PROGRESS NOTES
Shift assessment complete. See flow sheet. Scheduled meds given. See MAR. Patients head-toe complete, VS are logged, and active bowel sound noted in all four quadrants. No further needs noted at this time. Call light and bedside table are within reach. The bed is locked and is in the lowest position.

## 2022-02-22 NOTE — PROGRESS NOTES
Comprehensive Nutrition Assessment    Type and Reason for Visit:  Initial,RD Nutrition Re-Screen/LOS (LOS x 7)    Nutrition Recommendations/Plan:   1. Continue ADULT DIET; Regular; 4 carb choices per meal and monitor nutrition status  2. Monitor appetite, meal intake, and need for ONS. 3. Monitor weight trends, bowel function, respiratory status and nutrition related labs. Nutrition Assessment:  Patient was nutritionally compromised upon admission AEB decreased appetite/po intake x ~ 7 days PTA and respiratory dysfunction r/t COVID-19 virus, however, patient has improved throughout admission d/t patient has been consuming % of documented meals during admission; Will continue ADULT DIET; Regular; 4 carb choices per meal and monitor nutrition status    Malnutrition Assessment:  Malnutrition Status: At risk for malnutrition (Comment)    Context:  Acute Illness     Findings of the 6 clinical characteristics of malnutrition:  Energy Intake:  1 - 75% or less of estimated energy requirements for 7 or more days (decreased appetite/po intake x7 days PTA, however, improved throughout admission)  Weight Loss:  No significant weight loss     Body Fat Loss:  Unable to assess (COVID-19+)     Muscle Mass Loss:  Unable to assess (COVID-19+)    Fluid Accumulation:  No significant fluid accumulation     Strength:  Not Performed    Estimated Daily Nutrient Needs:  Energy (kcal):  8876-4439 kcals based on 9-11 kcals/kg/CBW; Weight Used for Energy Requirements:  Current     Protein (g):  184-202 g protein based on 2-2.2 g/kg/IBW;  Weight Used for Protein Requirements:  Ideal        Fluid (ml/day):  8800-4103 ml; Method Used for Fluid Requirements:  1 ml/kcal      Nutrition Related Findings:  patient is Alert; currently on 6L HFNC; Abd WDL, +BM 2/21; no edema; Skin color WDL, scattered bruising/abrasions; noted pt was dx with COVID 3 days PTA and reported that he was experiencing sx x 7 days PTA; reports decreased appetite/intake, headache, SOB; pt has consumed % of documented meals during admission; Na, Cl, and Cr are low; BUN, BG and ALT are elevated; patient has Vit C, Olumiant, tessalon, decadron, lovenox, humalog, valsartan and Vit D ordered at this time; Wounds:  None       Current Nutrition Therapies:    ADULT DIET; Regular; 4 carb choices (60 gm/meal)    Anthropometric Measures:  · Height: 6' 4\" (193 cm)  · Current Body Weight: 340 lb (154.2 kg) (obtained 2/21; actual weight)   · Admission Body Weight: 330 lb 8 oz (149.9 kg) (obtained 2/15; actual weight)    · Usual Body Weight: 334 lb (151.5 kg) (obtained 9/3/2020 per past encounters; limited recent weight hx)     · Ideal Body Weight: 202 lbs; % Ideal Body Weight 168.3 %   · BMI: 41.4   · BMI Categories: Obese Class 3 (BMI 40.0 or greater) (41.4)       Nutrition Diagnosis:   · Inadequate oral intake related to inadequate protein-energy intake,impaired respiratory function as evidenced by poor intake prior to admission,other (comment) (COVID-19+)    Nutrition Interventions:   Food and/or Nutrient Delivery:  Continue Current Diet  Nutrition Education/Counseling:  No recommendation at this time   Coordination of Nutrition Care:  Continue to monitor while inpatient    Goals:  patient will consume 75% or greater of meals on ADULT DIET;  Regular; 4 carb choices per meal x 3 meals per day       Nutrition Monitoring and Evaluation:   Behavioral-Environmental Outcomes:  None Identified   Food/Nutrient Intake Outcomes:  Food and Nutrient Intake  Physical Signs/Symptoms Outcomes:  Biochemical Data,GI Status,Weight     Discharge Planning:    Continue current diet     Electronically signed by Yordan Shipman RD, LD on 2/22/22 at 10:42 AM EST    Contact: [phone]

## 2022-02-22 NOTE — PROGRESS NOTES
Meds To Beds to meet patient wife to collect payment and deliver medications.  Patient to be picked up between 430 and 5pm.

## 2022-02-22 NOTE — CARE COORDINATION
DISCHARGE ORDER  Date/Time 2022 3:31 PM  Completed by: Cynthia Pereira RN, Case Management    Patient Name: Guera Aguilar      : 1970  Admitting Diagnosis: Acute respiratory failure with hypoxia ([address] [J96.01]  COVID-19 [U07.1]  Acute respiratory failure due to COVID-19 ([address] [U07.1, J96.00]      Admit order Date and Status:2022  (verify MD's last order for status of admission)      Noted discharge order. If applicable PT/OT recommendation at Discharge: Home PRN assist   DME recommendation by PT/OT:n/a  Confirmed discharge plan  (pt): Yes  with whom__________pt_____  If pt confirmed DC plan does family need to be contacted by CM No if yes who___n/a___  Discharge Plan: Reviewed chart. Role of discharge planner explained and patient verbalized understanding. Pt is alert and oriented. Discharge order is noted. Pt is being d/c'd to home today. Pt states that his wife is working in the ED and will get of of work at [phone] and will take him home then. Pt had meds delivered from [facility]. Eliquis is covered at 100%--$0/copay for the 2 weeks he will be on this. Neel Biswas RN gave pt a pulse ox from [facility] as pt is covid pos as of 2022. CM offered HC and pt declines. Neel Biswas RN walked pt and per her walk test \"O2 Sat at rest on room air is 93 %. O2 Sat with activity on room air is 91 %. \". Pt does not qualify for home O2. Pt's O2 sats are 93% on RA. No further discharge needs needed or noted. Reviewed chart. Role of discharge planner explained and patient verbalized understanding. Discharge order is noted. Has Home O2 in place on admit:  No  Informed of need to bring portable home O2 tank on day of discharge for nursing to connect prior to leaving:   Not Indicated  Verbalized agreement/Understanding:   Not Indicated    Discharge timeout done with [facility]. All discharge needs and concerns addressed.

## 2022-02-22 NOTE — DISCHARGE SUMMARY
Hospital Medicine Discharge Summary    Patient ID: Kathy Aponte      Patient's PCP: Shamir Au PA-C    Admit Date: 2/14/2022     Discharge Date:   2/22/2022    Admitting Provider: Ander Akhtar MD     Discharge Provider: Ruby Cardona MD     Discharge Diagnoses: Active Hospital Problems    Diagnosis     Nonsustained ventricular tachycardia (HCC) [I47.2]     Pneumonia due to COVID-19 virus [U07.1, J12.82]     ARDS (adult respiratory distress syndrome) (HCC) [J80]     Dry cough [R05.8]     ADALI (obstructive sleep apnea) [G47.33]     Acute respiratory failure with hypoxia ([address] [J96.01]     Diabetes mellitus type 2 in obese ([facility] [E11.69, E66.9]     ADALI on CPAP [G47.33, Z99.89]     COVID-19 [U07.1]     Hypertension [I10]        The patient was seen and examined on day of discharge and this discharge summary is in conjunction with any daily progress note from day of discharge. Hospital Course: *45 y. o. male with PMH below, presents with fatigue, myalgias, SOB, cough, COVID +, HA, decreased appetite/PO intake.  PT reports COVID like Sx which started ~7d ago. Mills Cockayne was Dx at his PCP's office 3 d ago but we are unable to verify this result.  Tested positive in ED by Daisy Morenoin was started on Augmentin, alb INH, prednisone by his PCP 3 days ago. All of the above Sx are progressively worsening.         #COVID 19 Pneumonia   #Acute hypoxic respiratory failure  - droplet plus isolation in place   - unvaccinated   - symptom onset 2/7/22  - does not use home O2,   - He required airvo for life threatening respiratory failure, now much improved   - Decadron D#8  - Baricitinib D#7   - Lasix IV 20 mg x 1 2/21 per pulm   - supportive care    - weaned to RA on 2/22 and doing fairly well with ambulation.    Pt with fairly limited mobility due to acute illness, will cont with low dose eliquis DVT/PE Ppx x14 days on discharge.          #Nonsustained Vtach on 2/18/22 resolved with PICC line position adjustment  - remains on tele  - picc has now been removed       #DM2  Resume PTA regimen on discharge       #HTN  -BP stable on home valsartan       #Morbid Obesity   -BMI QZ 33.6 FN/A²  -Complicating assessment and treatment.    -Placing patient at risk for multiple comorbidities as well as early death and contributing to the patients presentation.   -Patient would benefit from weight loss       #ADALI  -Home CPAP continued         Physical Exam Performed:     /72   Pulse 62   Temp 97 °F (36.1 °C) (Oral)   Resp 16   Ht 6' 4\" (1.93 m)   Wt (!) 340 lb (154.2 kg)   SpO2 92%   BMI 41.39 kg/m²       General appearance:  No apparent distress, appears stated age and cooperative. HEENT:  Normal cephalic, atraumatic without obvious deformity. Pupils equal, round, and reactive to light. Extra ocular muscles intact. Conjunctivae/corneas clear. Neck: Supple, with full range of motion. No jugular venous distention. Trachea midline. Respiratory:  Normal respiratory effort. Clear to auscultation, bilaterally without Rales/Wheezes/Rhonchi. Cardiovascular:  Regular rate and rhythm with normal S1/S2 without murmurs, rubs or gallops. Abdomen: Soft, non-tender, non-distended with normal bowel sounds. Musculoskeletal:  No clubbing, cyanosis or edema bilaterally. Full range of motion without deformity. Skin: Skin color, texture, turgor normal.  No rashes or lesions. Neurologic:  Neurovascularly intact without any focal sensory/motor deficits. Cranial nerves: II-XII intact, grossly non-focal.  Psychiatric:  Alert and oriented, thought content appropriate, normal insight  Capillary Refill: Brisk,< 3 seconds   Peripheral Pulses: +2 palpable, equal bilaterally       Labs:  For convenience and continuity at follow-up the following most recent labs are provided:      CBC:    Lab Results   Component Value Date    WBC 9.4 02/22/2022    HGB 13.8 02/22/2022    HCT 40.6 02/22/2022     02/22/2022       Renal:    Lab Results   Component Value Date     02/22/2022    K 4.7 02/22/2022    CL 98 02/22/2022    CO2 24 02/22/2022    BUN 24 02/22/2022    CREATININE 0.7 02/22/2022    CALCIUM 9.1 02/22/2022         Significant Diagnostic Studies    Radiology:   XR CHEST PORTABLE   Final Result   1. Retraction of the right upper extremity PICC with the tip now in the lower   superior vena cava. 2. No significant change in subpleural, basilar predominant airspace   opacities consistent with COVID-19 pneumonia given patient history. 3. Pulmonary vascular congestion and mild cardiomegaly. XR CHEST PORTABLE   Final Result   PICC line noted likely within the 6 cm below the cavoatrial junction on the   AP view. It is conceivable this may be needs to be retracted up to 10 cm. Multifocal pneumonia compatible with COVID. Critical results discussed between Dr. Lorin Mcdonald and Dr Diamond Allen on 2/18/2022 -            IR PICC WO SQ PORT/PUMP > 5 YEARS   Final Result   Successful placement of PICC line. CT CHEST PULMONARY EMBOLISM W CONTRAST   Final Result   No evidence of pulmonary embolism. Scattered areas of patchy and ground-glass opacities are noted throughout the   bilateral lungs. These are nonspecific but could indicate an atypical/viral   pneumonia, inclusive of COVID-19. XR CHEST 1 VIEW   Final Result   Scattered multifocal airspace opacities compatible with multifocal pneumonia. Consults:     PHARMACY TO DOSE MEDICATION  IP CONSULT TO PULMONOLOGY    Disposition:  home  Condition at Discharge: Stable    Discharge Instructions/Follow-up:  PCP 1 week.      Code Status:  Full Code     Activity: activity as tolerated    Diet: regular diet      Discharge Medications:     Current Discharge Medication List           Details   dexamethasone (DECADRON) 6 MG tablet Take 1 tablet by mouth daily (with breakfast) for 1 day  Qty: 1 tablet, Refills: 0      apixaban (ELIQUIS) 2.5 MG TABS tablet Take 1 tablet by mouth 2 times daily for 14 days  Qty: 28 tablet, Refills: 0              Details   glipiZIDE (GLUCOTROL) 5 MG tablet Take 2 tablets by mouth 2 times daily  Qty: 360 tablet, Refills: 1      valsartan (DIOVAN) 320 MG tablet Take 320 mg by mouth daily      Blood Glucose Monitoring Suppl (PRODIGY AUTOCODE BLOOD GLUCOSE) w/Device KIT Use as directed. Qty: 1 kit, Refills: 0      Prodigy Lancets 28G MISC Use to test blood sugar up to 4 times daily  Qty: 400 each, Refills: 3      blood glucose test strips (PRODIGY NO CODING BLOOD GLUC) strip Use to test blood sugar up to 4 times daily  Qty: 400 each, Refills: 3             Time Spent on discharge is more than 30 minutes in the examination, evaluation, counseling and review of medications and discharge plan. Signed:    Lobo Seth MD   2/22/2022      Thank you RODNEY MERCHANT PA-C for the opportunity to be involved in this patient's care. If you have any questions or concerns please feel free to contact me at [phone].

## 2022-02-22 NOTE — FLOWSHEET NOTE
02/21/22 2045   Vital Signs   Temp 97.8 °F (36.6 °C)   Temp Source Oral   Pulse 93   Heart Rate Source Monitor   Resp 17   /79   BP Location Left upper arm   Patient Position Semi fowlers   Level of Consciousness Alert (0)   MEWS Score 1   Patient Currently in Pain Denies   Pain Assessment   Pain Assessment 0-10   Pain Level 0   Patient's Stated Pain Goal No pain   Oxygen Therapy   SpO2 95 %   O2 Device Nasal cannula   O2 Flow Rate (L/min) 2 L/min       Pt assessment complete; see flow sheets. Vitals completed. Meds given per MAR. Pt stable at this time. Pt denies any other care needs at this time.      Be Yan RN

## 2022-02-22 NOTE — PLAN OF CARE
Nutrition Problem #1: Inadequate oral intake  Intervention: Food and/or Nutrient Delivery: Continue Current Diet  Nutritional Goals: patient will consume 75% or greater of meals on ADULT DIET;  Regular; 4 carb choices per meal x 3 meals per day

## 2022-02-22 NOTE — PLAN OF CARE
Problem: Gas Exchange - Impaired  Goal: Absence of hypoxia  Outcome: Ongoing  Goal: Promote optimal lung function  Outcome: Ongoing     Problem: Isolation Precautions - Risk of Spread of Infection  Goal: Prevent transmission of infection  Outcome: Ongoing     Problem: Fatigue  Goal: Verbalize increase energy and improved vitality  Outcome: Ongoing

## 2022-02-22 NOTE — PROGRESS NOTES
Pulmonary & Critical Care Medicine ICU Progress Note    CC: COVID-19    Events of Last 24 hours:   Continues to improve  O2 trending down-2 L        Vascular lines: IV: PICC -         / / /FiO2 : 50 %  No results for input(s): PHART, BMJ6ELR, PO2ART in the last 72 hours. IV:   sodium chloride      dextrose         Vitals:  Blood pressure 129/70, pulse 59, temperature 97 °F (36.1 °C), temperature source Oral, resp. rate 18, height 6' 4\" (1.93 m), weight (!) 340 lb (154.2 kg), SpO2 91 %. on HFNC   Temp  Av.5 °F (36.4 °C)  Min: 97 °F (36.1 °C)  Max: 98.3 °F (36.8 °C)    Intake/Output Summary (Last 24 hours) at 2022 0735  Last data filed at 2022 0349  Gross per 24 hour   Intake 591 ml   Output 2575 ml   Net -1984 ml     PE:  Constitutional:  No acute distress. Hiram Freud HEENT: no scleral icterus  Neck: No tracheal deviation present. Cardiovascular: Normal heart sounds. Pulmonary/Chest: No wheezes. No rhonchi. Minimal rales. No decreased breath sounds. No accessory muscle usage or stridor. Abdominal: Soft. Musculoskeletal: No cyanosis. No clubbing. Skin: Skin is warm and dry.        Scheduled Meds:   lidocaine 1 % injection  5 mL IntraDERmal Once    enoxaparin  40 mg SubCUTAneous BID    mupirocin   Nasal BID    benzonatate  200 mg Oral TID    sodium chloride flush  5-40 mL IntraVENous 2 times per day    valsartan  320 mg Oral Daily    insulin lispro  0-12 Units SubCUTAneous TID     insulin lispro  0-6 Units SubCUTAneous Nightly    baricitinib  4 mg Oral Daily    dexamethasone  6 mg IntraVENous Q24H    Vitamin D  2,000 Units Oral Daily    ascorbic acid  500 mg Oral Daily       Data:  CBC:   Recent Labs     22  0430 22  0440 22  0502   WBC 11.3* 11.9* 9.4   HGB 13.4* 13.8 13.8   HCT 40.0* 41.6 40.6   MCV 83.8 83.8 84.4    270 262     BMP:   Recent Labs     22  0430 22  0440 22  0501    136 134*   K 4.7 4.8 4.7    100 98*   CO2 26 27 24   BUN 17 19 24*   CREATININE 0.6* 0.7* 0.7*     LIVER PROFILE:   Recent Labs     02/20/22  0430 02/21/22  0440 02/22/22  0501   AST 28 26 31   ALT 57* 59* 72*   BILITOT 0.4 0.4 0.5   ALKPHOS 44 42 37       Microbiology:  2/14 COVID-19 detected    Imaging:  CXR 2/19  images reviewed by me and showed:   Diffuse bilateral ASD- no changes       CTPA 2/15 imaging reviewed by me and showed  No evidence of pulmonary embolism. Scattered areas of patchy and ground-glass opacities are noted throughout the   bilateral lungs. These are nonspecific but could indicate an atypical/viral   pneumonia, inclusive of COVID-19      ASSESSMENT:  · Acute hypoxemic respiratory failure-  · COVID 19 pneumonia in an unvaccinated individual  · ARDS  · Dry Cough  · Nonsustained V. tach 2/18 due to day PICC line position. Resolved with PICC line position adjustment/removal.  · DM2  · HTN  · Morbid Obesity  · ADALI on APAP 5-20 cmH2O. Changed 2/21 by me to 8-12 cmH2O.  · Unvaccinated for COVID-19     PLAN:  Supplemental oxygen to maintain SaO2 >92%; wean as tolerated  Continue home CPAP   Droplet plus isolation   Continue benzonatate 200 TID and Hycodan as needed  Avoid NEBs as able-albuterol MDI strongly preferred   Dexamethasone 6 mg IV day # 8-taper at discharge  Baricitinib day # 7  Lovenox BID   DC plan is okay with pulmonary. might need oxygen at discharge. Advised to titrate O2 using her pulse oximeter- target O2 sat 90-92%.

## 2022-02-22 NOTE — PROGRESS NOTES
Physical Therapy  Inpatient Physical Therapy Evaluation and Treatment    Unit: PCU  Date:  2/22/2022  Patient Name:    Kathy Aponte  Admitting diagnosis:  Acute respiratory failure with hypoxia ([facility] [address] [J96.01]  COVID-19 [U07.1]  Acute respiratory failure due to COVID-19 [facility]) [U07.1, J96.00]  Admit Date:  2/14/2022  Precautions/Restrictions/WB Status/ Lines/ Wounds/ Oxygen: Lines -IV and Supplemental O2 (2 L), Telemetry and Continuous pulse oximetry    Treatment Time:  3599-2301  Treatment Number:  1   Timed Code Treatment Minutes: 12 minutes  Total Treatment Minutes:  22  minutes    Patient Goals for Therapy: \" to go home  \"          Discharge Recommendations: Home PRN assist   DME needs for discharge: No Needs       Therapy recommendation for EMS Transport: can transport by wheelchair    Therapy recommendations for staff: Independent with use of No AD for all transfers and ambulation to/from [facility]  to/from [facility]    History of Present Illness: H&P per [facility] - JAMES RODRIGUEZ 2-21-22  51 y. o. male with PMH below, presents with fatigue, myalgias, SOB, cough, COVID +, HA, decreased appetite/PO intake.  PT reports COVID like Sx which started ~7d ago. Mills Cockayne was Dx at his PCP's office 3 d ago but we are unable to verify this result.  Tested positive in ED by Daisy Morenoin was started on Augmentin, alb INH, prednisone by his PCP 3 days ago. All of the above Sx are progressively worsening    Home Health S4 Level Recommendation:  NA  AM-PAC Mobility Score    AM-PAC Inpatient Mobility Raw Score : 23       Preadmission Environment    Preadmission Environment    Pt.  Lives Alone works at his [employer]. environment:    two story home- bed room upstairs  Steps to enter first floor: 2-3 with railing  steps to enter  Steps to second floor: full flight - with railing   Bathroom: tub/shower unit and standard height commode, half bath on first floor - standard height commode   Equipment owned: none     Preadmission Status:  Pt. Able to drive: Yes  Pt Fully independent with ADLs: Yes  Pt. Required assistance from family for: Cleaning lady comes every two weeks   Pt. independent for transfers and gait and walked with No Device  History of falls No    Pain   No  Location: NA  Rating: NA /10  Pain Medicine Status: Denies need    Cognition    A&O x4, Person, Place, Time and Situation   Able to follow 2 step commands    Subjective  Patient lying supine in bed with RN at bedside. Pt agreeable to this PT eval & tx. Upper Extremity ROM/Strength  Please see OT evaluation. Lower Extremity ROM / Strength   AROM WFL: Yes  ROM limitations: NA    Strength Assessment (measured on a 0-5 scale):  R LE   Quad   5   Ant Tib  5   Hamstring 5   Iliopsoas 5  L LE  Quad   5   Ant Tib  5   Hamstring 5   Iliopsoas 5    Lower Extremity Sensation    WNL    Lower Extremity Proprioception:   WNL    Coordination and Tone  WNL    Balance  Sitting:  Normal; Independent  Comments: sitting EOB    Standing: Normal; Independent  Comments: no AD    Bed Mobility   Supine to Sit:    Independent  Sit to Supine:   Not Tested  Rolling:   Independent  Scooting in sitting: Independent  Scooting in supine:  Not Tested    Transfer Training     Sit to stand:   Independent  Stand to sit: Independent  Bed to Chair:   Independent with use of No AD    Gait gait completed as indicated below  Distance:      50 ft  Deviations (firm surface/linoleum):  decreased hunter and increased KATRIN  Assistive Device Used:    No AD  Level of Assist:    Independent  Comment: Pt ambulates with no AD, no LOB and no SOB. Pt SpO2 90% on 2 L post-ambulation    Stair Training stairs completed as indicated below  # of Steps:   3  Level of Assist:  Independent  UE Support:  unilateral on right  Assistive Device:  No AD  Pattern:   non-reciprocal pattern  Comments: Pt ascends/descends 3 steps with RHR and IND. No LOB.  SpO2 92% on 2 L after task    Activity Tolerance   Pt completed therapy session with No adverse symptoms noted w/activity   At rest: /77, HR 73 bpm, SpO2 95% on 2 L  After steps: SpO2 92% on 2 L  After ambulation: SpO2 90% on 2 L    Positioning Needs   Pt up in chair, no alarm needed, positioned in proper neutral alignment and pressure relief provided. Call light provided and all needs within reach    Exercises Initiated  Ricardo deferred secondary to treatment focus on functional mobility  NA    Patient/Family Education   Pt educated on role of inpatient PT, POC, importance of continued activity, DC recommendations, safety awareness, transfer techniques, pursed lip breathing, energy conservation, pacing activity, HEP and calling for assist with mobility. Assessment  Pt seen for Physical Therapy evaluation in acute care setting. At baseline, pt lives alone and performs functional mobility IND with no AD. Currently, pt functioning at baseline level on 2 L O2. Pt demo good safety awareness with O2 lines and does not require verbal cues for safety with any functional tasks. No further PT needs as pt at baseline level. Recommending Home PRN assist upon discharge as patient functioning independently    Goals : To be met in 3 visits:  1). Independent with LE Ex x 10 reps    To be met in 6 visits:  1). Supine to/from sit: Independent--Goal Met 2/22  2). Sit to/from stand: Independent --Goal Met 2/22  3). Bed to chair: Independent --Goal Met 2/22  4). Gait: Ambulate 50 ft.  with  Independent and use of No AD --Goal Met 2/22  5). Tolerate B LE exercises 3 sets of 10-15 reps--Goal Met 2/22  6).   Ascend/descend 3 steps with Independent with use of hand rail unilateral and No AD --Goal Met 2/22    Rehabilitation Potential: Good  Strengths for achieving goals include:   Pt motivated, PLOF, Family Support and Pt cooperative   Barriers to achieving goals include:    No Barriers    Plan    To be seen for PT eval only while in acute care setting    Signature: Mehrdad Castro, PT, DPT      If patient discharges from this facility prior to next visit, this note will serve as the Discharge Summary.

## 2022-02-23 ENCOUNTER — CARE COORDINATION (OUTPATIENT)
Dept: OTHER | Facility: CLINIC | Age: 52
End: 2022-02-23

## 2022-02-23 NOTE — CARE COORDINATION
[facility] Transitions Initial Follow Up Call    Call within 2 business days of discharge: Yes    Patient: Chong Gonzalez Patient : 1970   MRN: [id number]  Reason for Admission: COVID-19; acute respiratory failure with hypoxia  Discharge Date: 22 RARS: Readmission Risk Score: 9 ( )      Last Discharge [facility]       Complaint Diagnosis Description Type Department Provider    22 Positive For Covid-19 COVID-19 . .. ED to Hosp-Admission (Discharged) (ADMITTED) [facility] PCU Jacquelyn Kerr MD; Tristin Correia... Patient contacted regarding COVID-19 diagnosis. Discussed COVID-19 related testing which was available at this time. Test results were positive. Patient informed of results, if available? Yes. Ambulatory Care Manager contacted the patient by telephone to perform post discharge assessment. Call within 2 business days of discharge: Yes. Verified name and  with patient as identifiers. Provided introduction to self, and explanation of the CTN/ACM role, and reason for call due to risk factors for infection and/or exposure to COVID-19. Symptoms reviewed with patient who verbalized the following symptoms: no new symptoms and no worsening symptoms. Due to no new or worsening symptoms encounter was not routed to provider for escalation. Discussed follow-up appointments. If no appointment was previously scheduled, appointment scheduling offered: Yes. [address] follow up appointment(s): No future appointments. [facility](s):     Non-face-to-face services provided:  Obtained and reviewed discharge summary and/or continuity of care documents  Education of patient/family/caregiver/guardian to support self-management-discussed red flags in detail  Assessment and support for treatment adherence and medication management-med review completed. no questions/concerns     Advance Care Planning:   Does patient have an Advance Directive:  not on file.      Educated patient about risk for severe COVID-19 due to risk factors according to CDC guidelines. ACM reviewed discharge instructions, medical action plan and red flag symptoms with the patient who verbalized understanding. Discussed COVID vaccination status: No. Education provided on COVID-19 vaccination as appropriate. Discussed exposure protocols and quarantine with CDC Guidelines. Patient was given an opportunity to verbalize any questions and concerns and agrees to contact ACM or health care provider for questions related to their healthcare. Reviewed and educated patient on any new and changed medications related to discharge diagnosis     Was patient discharged with a pulse oximeter? Yes Discussed and confirmed pulse oximeter discharge instructions and when to notify provider or seek emergency care. AC provided contact information. Plan for follow-up call in 5-7 days based on severity of symptoms and risk factors. Patient states he is doing much better. Denies shortness of breath or any other symptoms at this time. O2 sat is 92% today. Patient states he took dexamethasone already since being home and has completed this. Patient will be doing Eliquis for 2 weeks. Patient has this filled and at home. Discussed bleeding precautions. no other medication changes. patient states he declined home care- his kids are checking in on him and helping. Patient states his blood sugars have been higher than his normal but does expect them to go down in a few days due to finishing steroid. No questions or concerns at this time. Patient states he will call this afternoon to set up an appointment with PCP for hospital f/u. Follow Up  No future appointments.     Monroe Rahman RN

## 2022-02-25 NOTE — ED PROVIDER NOTES
I independently examined and evaluated Chi Lloyd. I personally saw the patient and performed a substantive portion of the visit including all aspects of the medical decision making    In brief, Chi Lloyd is a 45 y.o. male with a past medical history of diabetes, hyperlipidemia, hypertension, depression, who presents to the ED complaining of shortness of breath. Patient been having symptoms 7 days ago and tested positive for coronavirus 3 days ago. Patient was prescribed prednisone, Augmentin, and albuterol by PCP. Treatment has not improved his symptoms and states that symptoms of actually worsen despite this treatment. He reports not cough, myalgias, and frontal headache. No palliative or provocative factors. He reports poor p.o. intake but no vomiting or diarrhea or abdominal pain. He denies chest pain. . Denies history of blood clots or active malignancy. Patient denies unilateral leg swelling, hemoptysis, recent travel or surgery/immobilization, or OCP or other hormone use. Patient was not vaccinated      REVIEW OF SYSTEMS  All systems reviewed, pertinent positives per HPI otherwise noted to be negative. Focused exam revealed   PHYSICAL EXAM  /91  Pulse 95   Temp 98.2 °F  (Oral)   Resp 22   Ht 6' 4\" (1.93 m)   Wt (!) 340 lb (154.2 kg)   SpO2 92%   BMI 41.39 kg/m²    GENERAL APPEARANCE: Awake and alert. Cooperative. HENT: Normocephalic. Atraumatic. Mucous membranes are moist  NECK: Supple. Full range of motion of the neck without stiffness or pain. EYES: PERRL. EOM's grossly intact. HEART/CHEST: RRR. No murmurs. Chest wall is ot tender to palpation. LUNGS: Respirations unlabored. CTAB. Good air exchange. Speaking comfortably in full sentences. ABDOMEN: No tenderness. Soft. Non-distended. No masses. No organomegaly. No guarding or rebound. MUSCULOSKELETAL: No extremity edema. Compartments soft. No deformity. No tenderness in the extremities.   All extremities neurovascularly intact. SKIN: Warm and dry. No acute rashes. NEUROLOGICAL: Alert and oriented. No gross facial drooping. Strength 5/5, sensation intact. PSYCHIATRIC: Normal mood and affect. ED course / MDM:   Overall patient presenting for shortness of breath in the setting of COVID. Physical exam remarkable for hypoxia on room air. I personally saw the patient and performed a substantive portion of the visit including all aspects of the medical decision making. Differential diagnosis includes but is not limited to: COVID, Pneumonia, pneumothorax, pleural effusion, ACS, CHF exacerbation, COPD exacerbation, asthma, pulmonary embolism, arrhythmia, anemia      Workup showed:    XR CHEST 1 VIEW   Final Result   Scattered multifocal airspace opacities compatible with multifocal pneumonia. Chest x-ray shows findings consistent with known infection. No pneumothorax. The Ekg interpreted by me shows  sinus tachycardia, lpxy=551   Axis is   Normal  QTc is  prolonged  Intervals and Durations are unremarkable. ST Segments: nonspecific changes  No significant change from prior EKG dated 12/22/14       ED Course as of 02/25/22 1201   Mon Feb 14, 2022 2204 No leukocytosis, anemia, thrombocytopenia. [ER]   2204 Lactate within normal limits. [ER]   2207 Procalcitonin within normal limits, low suspicion for bacterial superinfection. [ER]   2208 Troponin within normal limits [ER]   2222 Covid swab positive. Flu negative. [ER]   9292 No significant electrolyte abnormalities or evidence of kidney dysfunction. [ER]   2222 Liver function testing overall unremarkable. [ER]      ED Course User Index  [ER] Dale Munoz MD     Based on results of work-up, I am concerned for Covid pneumonia with acute respiratory failure. Patient hypoxic to 88% on room air. At this time, do feel the patient requires admission for further work-up and management.   Discussed the patient with hospital team.    CLINICAL IMPRESSION  1. COVID-19    2. Acute respiratory failure with hypoxia (HCC)        Blood pressure 134/72, pulse 62, temperature 97 °F (36.1 °C), temperature source Oral, resp. rate 16, height 6' 4\" (1.93 m), weight (!) 340 lb (154.2 kg), SpO2 92 %. Abhishek Hou was admitted in stable condition. All diagnostic, treatment, and disposition decisions were made by myself in conjunction with the advanced practice provider. For all further details of the patient's emergency department visit, please see the advanced practice provider's documentation. Comment: Please note this report has been produced using speech recognition software and may contain errors related to that system including errors in grammar, punctuation, and spelling, as well as words and phrases that may be inappropriate. If there are any questions or concerns please feel free to contact the dictating provider for clarification.        Johnny Iyer MD  02/25/22 0501

## 2022-03-02 ENCOUNTER — CARE COORDINATION (OUTPATIENT)
Dept: OTHER | Facility: CLINIC | Age: 52
End: 2022-03-02

## 2022-03-02 NOTE — CARE COORDINATION
[facility] Transitions Follow Up Call    3/2/2022    Patient: Ricardo Acosta  Patient : 1970   MRN: [id number]  Reason for Admission: COVID-19; acute respiratory failure with hypoxia  Discharge Date: 22 RARS: Readmission Risk Score: 9 ( )       Patient contacted regarding COVID-19 diagnosis. Discussed COVID-19 related testing which was available at this time. Test results were positive. Patient informed of results, if available? Yes    Ambulatory Care Manager contacted the patient by telephone to perform follow-up assessment. Verified name and  with patient as identifiers. Patient has following risk factors of: ADALI. Symptoms reviewed with patient who verbalized the following symptoms: cough. Due to no new or worsening symptoms encounter was not routed to provider for escalation. Educated patient about risk for severe COVID-19 due to risk factors according to CDC guidelines. ACM reviewed discharge instructions, medical action plan and red flag symptoms with the patient who verbalized understanding. Discussed COVID vaccination status: No. Education provided on COVID-19 vaccination as appropriate. Discussed exposure protocols and quarantine with CDC Guidelines. Patient was given an opportunity to verbalize any questions and concerns and agrees to contact ACM or health care provider for questions related to their healthcare. Was patient discharged with a pulse oximeter? Yes Discussed and confirmed pulse oximeter discharge instructions and when to notify provider or seek emergency care. ACM provided contact information. plan for follow up call in 2 weeks based on severity of symptoms and risk factors. Patient states he had a f/u appt with PCP last Friday. He was prescribed ativan to help him sleep at night. Patient states he has had difficulty sleeping since hospitalization. Patient states ativan helped for the first few nights but is no longer helping much.  Oxygen is still reading 92-95%. Denies shortness of breath. Still has nagging cough. Patient's blood sugar is coming down. States he did not test today but yesterday it was 130. Patient will continue Eliquis for one more week. No issues with medication or bleeding. Follow Up  No future appointments.     Manuel Lowe RN

## 2022-03-17 ENCOUNTER — CARE COORDINATION (OUTPATIENT)
Dept: OTHER | Facility: CLINIC | Age: 52
End: 2022-03-17

## 2022-03-17 NOTE — CARE COORDINATION
on severity of symptoms and risk factors. Patient declines further care management calls since he is back at baseline. No further outreach scheduled with this ACM, ACM will sign off care team at this time. Episode of Care resolved. Patient has this ACM's contact information if future needs arise. Follow Up  No future appointments.     Michael Rivera RN

## 2022-10-18 NOTE — PROCEDURE: PRESCRIPTION MEDICATION MANAGEMENT
OB/GYN Resident Interval Note    Evaluated patient at bedside. Patient is resting comfortably, not in any acute distress. The patient states she is still having right flank pain and some pain with urination. The patient's vital signs are stable and she has been afebrile over night. On physical exam, the patient's right CVA tenderness is improved compared to yesterday. The patient has received one dose of 1g IV rocephin, will receive a second dose at 1500 today. We will reassess for symptomatic improvement.      Celi Valle MD
Otc Regimen: Start Aquaphor BID-TID to hands. Start OTC antihistamine qd.
Detail Level: Simple
Discontinue Regimen: Fluocinonide 0.05% ointment
Render In Strict Bullet Format?: No
Discontinue Regimen: Fluocinonide ointment

## 2022-12-12 ENCOUNTER — HOSPITAL ENCOUNTER (OUTPATIENT)
Age: 52
Discharge: HOME | End: 2022-12-12
Payer: COMMERCIAL

## 2022-12-12 LAB
A/G RATIO: 1.5 (ref 1.1–2.2)
ALBUMIN SERPL-MCNC: 4.2 G/DL (ref 3.4–5)
ALP BLD-CCNC: 63 U/L (ref 40–129)
ALT SERPL-CCNC: 111 U/L (ref 10–40)
ANION GAP SERPL CALCULATED.3IONS-SCNC: 14 MMOL/L (ref 3–16)
AST SERPL-CCNC: 67 U/L (ref 15–37)
BASOPHILS ABSOLUTE: 0 K/UL (ref 0–0.2)
BASOPHILS RELATIVE PERCENT: 0.3 %
BILIRUB SERPL-MCNC: 0.5 MG/DL (ref 0–1)
BUN BLDV-MCNC: 17 MG/DL (ref 7–20)
CALCIUM SERPL-MCNC: 9.2 MG/DL (ref 8.3–10.6)
CHLORIDE BLD-SCNC: 97 MMOL/L (ref 99–110)
CHOLESTEROL, TOTAL: 214 MG/DL (ref 0–199)
CO2: 24 MMOL/L (ref 21–32)
CREAT SERPL-MCNC: 0.7 MG/DL (ref 0.9–1.3)
EOSINOPHILS ABSOLUTE: 0.1 K/UL (ref 0–0.6)
EOSINOPHILS RELATIVE PERCENT: 1.6 %
GFR SERPL CREATININE-BSD FRML MDRD: >60 ML/MIN/{1.73_M2}
GLUCOSE BLD-MCNC: 277 MG/DL (ref 70–99)
HCT VFR BLD CALC: 44.8 % (ref 40.5–52.5)
HDLC SERPL-MCNC: 31 MG/DL (ref 40–60)
HEMOGLOBIN: 15.1 G/DL (ref 13.5–17.5)
LDL CHOLESTEROL CALCULATED: ABNORMAL MG/DL
LDL CHOLESTEROL DIRECT: 123 MG/DL
LYMPHOCYTES ABSOLUTE: 2.3 K/UL (ref 1–5.1)
LYMPHOCYTES RELATIVE PERCENT: 35.3 %
MCH RBC QN AUTO: 29 PG (ref 26–34)
MCHC RBC AUTO-ENTMCNC: 33.8 G/DL (ref 31–36)
MCV RBC AUTO: 85.8 FL (ref 80–100)
MONOCYTES ABSOLUTE: 0.5 K/UL (ref 0–1.3)
MONOCYTES RELATIVE PERCENT: 7.3 %
NEUTROPHILS ABSOLUTE: 3.7 K/UL (ref 1.7–7.7)
NEUTROPHILS RELATIVE PERCENT: 55.5 %
PDW BLD-RTO: 14 % (ref 12.4–15.4)
PLATELET # BLD: 179 K/UL (ref 135–450)
PMV BLD AUTO: 9.9 FL (ref 5–10.5)
POTASSIUM SERPL-SCNC: 4.4 MMOL/L (ref 3.5–5.1)
PROSTATE SPECIFIC ANTIGEN: 0.6 NG/ML (ref 0–4)
RBC # BLD: 5.22 M/UL (ref 4.2–5.9)
SODIUM BLD-SCNC: 135 MMOL/L (ref 136–145)
TOTAL PROTEIN: 7 G/DL (ref 6.4–8.2)
TRIGL SERPL-MCNC: 383 MG/DL (ref 0–150)
TSH SERPL DL<=0.05 MIU/L-ACNC: 0.75 UIU/ML (ref 0.27–4.2)
VLDLC SERPL CALC-MCNC: ABNORMAL MG/DL
WBC # BLD: 6.6 K/UL (ref 4–11)

## 2022-12-12 PROCEDURE — 83036 HEMOGLOBIN GLYCOSYLATED A1C: CPT

## 2022-12-12 PROCEDURE — 84443 ASSAY THYROID STIM HORMONE: CPT

## 2022-12-12 PROCEDURE — 84153 ASSAY OF PSA TOTAL: CPT

## 2022-12-12 PROCEDURE — 36415 COLL VENOUS BLD VENIPUNCTURE: CPT

## 2022-12-12 PROCEDURE — 80053 COMPREHEN METABOLIC PANEL: CPT

## 2022-12-12 PROCEDURE — 80061 LIPID PANEL: CPT

## 2022-12-12 PROCEDURE — 85025 COMPLETE CBC W/AUTO DIFF WBC: CPT

## 2022-12-13 LAB
ESTIMATED AVERAGE GLUCOSE: 234.6 MG/DL
HBA1C MFR BLD: 9.8 %

## 2023-02-13 ENCOUNTER — HOSPITAL ENCOUNTER (OUTPATIENT)
Dept: MRI IMAGING | Age: 53
Discharge: HOME | End: 2023-02-13
Payer: COMMERCIAL

## 2023-02-13 DIAGNOSIS — M54.12 CERVICAL RADICULOPATHY: ICD-10-CM

## 2023-02-13 PROCEDURE — 72141 MRI NECK SPINE W/O DYE: CPT

## 2023-03-09 ENCOUNTER — TRANSCRIBE ORDERS (OUTPATIENT)
Dept: ADMINISTRATIVE | Age: 53
End: 2023-03-09

## 2023-03-09 DIAGNOSIS — R10.11 RIGHT UPPER QUADRANT PAIN: Primary | ICD-10-CM

## 2023-03-10 ENCOUNTER — HOSPITAL ENCOUNTER (OUTPATIENT)
Dept: ULTRASOUND IMAGING | Age: 53
Discharge: HOME | End: 2023-03-10
Payer: COMMERCIAL

## 2023-03-10 ENCOUNTER — HOSPITAL ENCOUNTER (OUTPATIENT)
Age: 53
Discharge: HOME | End: 2023-03-10
Payer: COMMERCIAL

## 2023-03-10 DIAGNOSIS — R10.11 RIGHT UPPER QUADRANT PAIN: ICD-10-CM

## 2023-03-10 LAB
A/G RATIO: 1.4 (ref 1.1–2.2)
ALBUMIN SERPL-MCNC: 4.2 G/DL (ref 3.4–5)
ALP BLD-CCNC: 54 U/L (ref 40–129)
ALT SERPL-CCNC: 72 U/L (ref 10–40)
ANION GAP SERPL CALCULATED.3IONS-SCNC: 11 MMOL/L (ref 3–16)
AST SERPL-CCNC: 44 U/L (ref 15–37)
BILIRUB SERPL-MCNC: 0.5 MG/DL (ref 0–1)
BUN BLDV-MCNC: 15 MG/DL (ref 7–20)
CALCIUM SERPL-MCNC: 9.5 MG/DL (ref 8.3–10.6)
CHLORIDE BLD-SCNC: 101 MMOL/L (ref 99–110)
CO2: 25 MMOL/L (ref 21–32)
CREAT SERPL-MCNC: 0.8 MG/DL (ref 0.9–1.3)
GFR SERPL CREATININE-BSD FRML MDRD: >60 ML/MIN/{1.73_M2}
GLUCOSE BLD-MCNC: 119 MG/DL (ref 70–99)
POTASSIUM SERPL-SCNC: 4.4 MMOL/L (ref 3.5–5.1)
SODIUM BLD-SCNC: 137 MMOL/L (ref 136–145)
TOTAL PROTEIN: 7.1 G/DL (ref 6.4–8.2)

## 2023-03-10 PROCEDURE — 76705 ECHO EXAM OF ABDOMEN: CPT

## 2023-03-10 PROCEDURE — 83036 HEMOGLOBIN GLYCOSYLATED A1C: CPT

## 2023-03-10 PROCEDURE — 36415 COLL VENOUS BLD VENIPUNCTURE: CPT

## 2023-03-10 PROCEDURE — 80053 COMPREHEN METABOLIC PANEL: CPT

## 2023-03-11 LAB
ESTIMATED AVERAGE GLUCOSE: 194.4 MG/DL
HBA1C MFR BLD: 8.4 %

## 2023-06-05 ENCOUNTER — HOSPITAL ENCOUNTER (OUTPATIENT)
Age: 53
Setting detail: SPECIMEN
Discharge: HOME | End: 2023-06-05
Payer: COMMERCIAL

## 2023-06-05 PROCEDURE — 87205 SMEAR GRAM STAIN: CPT

## 2023-06-05 PROCEDURE — 87070 CULTURE OTHR SPECIMN AEROBIC: CPT

## 2023-06-07 LAB
BACTERIA SPEC AEROBE CULT: ABNORMAL
GRAM STN SPEC: ABNORMAL

## 2023-06-26 ENCOUNTER — OFFICE VISIT (OUTPATIENT)
Dept: PULMONOLOGY | Age: 53
End: 2023-06-26
Payer: COMMERCIAL

## 2023-06-26 VITALS
SYSTOLIC BLOOD PRESSURE: 126 MMHG | DIASTOLIC BLOOD PRESSURE: 72 MMHG | OXYGEN SATURATION: 96 % | HEART RATE: 82 BPM | RESPIRATION RATE: 16 BRPM | HEIGHT: 76 IN | WEIGHT: 315 LBS | BODY MASS INDEX: 38.36 KG/M2

## 2023-06-26 DIAGNOSIS — Z99.89 OSA ON CPAP: Primary | ICD-10-CM

## 2023-06-26 DIAGNOSIS — I10 PRIMARY HYPERTENSION: ICD-10-CM

## 2023-06-26 DIAGNOSIS — G47.33 OSA ON CPAP: Primary | ICD-10-CM

## 2023-06-26 DIAGNOSIS — E66.01 CLASS 2 SEVERE OBESITY DUE TO EXCESS CALORIES WITH SERIOUS COMORBIDITY AND BODY MASS INDEX (BMI) OF 39.0 TO 39.9 IN ADULT (HCC): ICD-10-CM

## 2023-06-26 PROCEDURE — 3078F DIAST BP <80 MM HG: CPT | Performed by: NURSE PRACTITIONER

## 2023-06-26 PROCEDURE — 3074F SYST BP LT 130 MM HG: CPT | Performed by: NURSE PRACTITIONER

## 2023-06-26 PROCEDURE — 99213 OFFICE O/P EST LOW 20 MIN: CPT | Performed by: NURSE PRACTITIONER

## 2023-06-26 RX ORDER — CIPROFLOXACIN HYDROCHLORIDE 3.5 MG/ML
SOLUTION/ DROPS TOPICAL
COMMUNITY
Start: 2023-06-21 | End: 2023-06-26

## 2023-06-26 RX ORDER — GLIPIZIDE 10 MG/1
TABLET ORAL
COMMUNITY
Start: 2023-04-02

## 2023-06-26 RX ORDER — ROSUVASTATIN CALCIUM 20 MG/1
TABLET, COATED ORAL
COMMUNITY
End: 2023-06-26

## 2023-06-26 RX ORDER — METHOCARBAMOL 750 MG/1
TABLET, FILM COATED ORAL
COMMUNITY
End: 2023-06-26

## 2023-06-26 RX ORDER — SEMAGLUTIDE 1.34 MG/ML
INJECTION, SOLUTION SUBCUTANEOUS
COMMUNITY
End: 2023-06-26

## 2023-06-26 RX ORDER — LORAZEPAM 0.5 MG/1
TABLET ORAL
COMMUNITY
End: 2023-06-26

## 2023-06-26 RX ORDER — TRAZODONE HYDROCHLORIDE 50 MG/1
TABLET ORAL
COMMUNITY
End: 2023-06-26

## 2023-06-26 RX ORDER — FENOFIBRATE 160 MG/1
TABLET ORAL
COMMUNITY
End: 2023-06-26

## 2023-06-26 RX ORDER — TIRZEPATIDE 7.5 MG/.5ML
INJECTION, SOLUTION SUBCUTANEOUS
COMMUNITY
Start: 2023-06-06

## 2023-06-26 ASSESSMENT — SLEEP AND FATIGUE QUESTIONNAIRES
HOW LIKELY ARE YOU TO NOD OFF OR FALL ASLEEP WHILE SITTING AND TALKING TO SOMEONE: 1
HOW LIKELY ARE YOU TO NOD OFF OR FALL ASLEEP WHILE WATCHING TV: 3
HOW LIKELY ARE YOU TO NOD OFF OR FALL ASLEEP WHILE SITTING INACTIVE IN A PUBLIC PLACE: 3
HOW LIKELY ARE YOU TO NOD OFF OR FALL ASLEEP IN A CAR, WHILE STOPPED FOR A FEW MINUTES IN TRAFFIC: 0
NECK CIRCUMFERENCE (INCHES): 16
HOW LIKELY ARE YOU TO NOD OFF OR FALL ASLEEP WHILE LYING DOWN TO REST IN THE AFTERNOON WHEN CIRCUMSTANCES PERMIT: 2
HOW LIKELY ARE YOU TO NOD OFF OR FALL ASLEEP WHILE SITTING QUIETLY AFTER LUNCH WITHOUT ALCOHOL: 1
HOW LIKELY ARE YOU TO NOD OFF OR FALL ASLEEP WHILE SITTING AND READING: 3
ESS TOTAL SCORE: 16
HOW LIKELY ARE YOU TO NOD OFF OR FALL ASLEEP WHEN YOU ARE A PASSENGER IN A CAR FOR AN HOUR WITHOUT A BREAK: 3

## 2023-06-26 ASSESSMENT — ENCOUNTER SYMPTOMS
ABDOMINAL PAIN: 0
CHEST TIGHTNESS: 0
COUGH: 0
WHEEZING: 0
EYE PAIN: 0
SHORTNESS OF BREATH: 0
RHINORRHEA: 0
SORE THROAT: 0

## 2023-08-25 ENCOUNTER — HOSPITAL ENCOUNTER (EMERGENCY)
Age: 53
Discharge: HOME | End: 2023-08-25
Attending: EMERGENCY MEDICINE
Payer: COMMERCIAL

## 2023-08-25 VITALS
RESPIRATION RATE: 18 BRPM | OXYGEN SATURATION: 95 % | HEIGHT: 75 IN | WEIGHT: 300 LBS | SYSTOLIC BLOOD PRESSURE: 126 MMHG | DIASTOLIC BLOOD PRESSURE: 76 MMHG | TEMPERATURE: 98.7 F | BODY MASS INDEX: 37.3 KG/M2 | HEART RATE: 78 BPM

## 2023-08-25 DIAGNOSIS — S61.011A LACERATION OF RIGHT THUMB, FOREIGN BODY PRESENCE UNSPECIFIED, NAIL DAMAGE STATUS UNSPECIFIED, INITIAL ENCOUNTER: Primary | ICD-10-CM

## 2023-08-25 PROCEDURE — 90471 IMMUNIZATION ADMIN: CPT | Performed by: EMERGENCY MEDICINE

## 2023-08-25 PROCEDURE — 90715 TDAP VACCINE 7 YRS/> IM: CPT | Performed by: EMERGENCY MEDICINE

## 2023-08-25 PROCEDURE — 99284 EMERGENCY DEPT VISIT MOD MDM: CPT

## 2023-08-25 PROCEDURE — 6360000002 HC RX W HCPCS: Performed by: EMERGENCY MEDICINE

## 2023-08-25 PROCEDURE — 12002 RPR S/N/AX/GEN/TRNK2.6-7.5CM: CPT

## 2023-08-25 RX ADMIN — TETANUS TOXOID, REDUCED DIPHTHERIA TOXOID AND ACELLULAR PERTUSSIS VACCINE, ADSORBED 0.5 ML: 5; 2.5; 8; 8; 2.5 SUSPENSION INTRAMUSCULAR at 19:16

## 2023-08-25 ASSESSMENT — LIFESTYLE VARIABLES
HOW MANY STANDARD DRINKS CONTAINING ALCOHOL DO YOU HAVE ON A TYPICAL DAY: PATIENT DOES NOT DRINK
HOW OFTEN DO YOU HAVE A DRINK CONTAINING ALCOHOL: NEVER

## 2023-08-25 ASSESSMENT — PAIN - FUNCTIONAL ASSESSMENT
PAIN_FUNCTIONAL_ASSESSMENT: NONE - DENIES PAIN
PAIN_FUNCTIONAL_ASSESSMENT: NONE - DENIES PAIN

## 2023-08-25 NOTE — ED NOTES
Discharge instructions and medications reviewed with patient. Patient verbalized understanding of medications and follow up. All questions answered at this time. Skin warm, pink, and dry. Patient alert and oriented x4. Pt ambulated to lobby with a stable gait. Patient discharged home with 0 prescriptions.        Gilma Lewis RN  08/25/23 [id number]

## 2023-08-25 NOTE — ED PROVIDER NOTES
Emergency Department Attending Physician Note  Location: [facility] EMERGENCY DEPARTMENT  8/25/2023       Pt Name: Alondra Franklin  MRN: [id number]  [address] [facility] [place] 1970    Date of evaluation: 8/25/2023  Provider: Yina Tejada DO  PCP: Zach Waldrop PA-C    Note Started: 7:13 PM EDT 8/25/23    CHIEF COMPLAINT  Chief Complaint   Patient presents with    Laceration     Left thumb from knife, unknown on dtap       HISTORY OF PRESENT ILLNESS:  History obtained by {Persons; family members:50074}. {Limitations to history (Optional):30203}. Alondra Franklin is a 46 y.o. male with a significant PMHx of ***      Nursing Notes were all reviewed and agreed with or any disagreements were addressed in the HPI. MEDICAL HISTORY  Past Medical History:   Diagnosis Date    Cholesterol serum elevated     Depression     Diabetes mellitus ([address])     Hypertension     Reflux         SURGICAL HISTORY  Past Surgical History:   Procedure Laterality Date    ANTERIOR CRUCIATE LIGAMENT REPAIR      CARPAL TUNNEL RELEASE  11/15/10    right    CARPAL TUNNEL RELEASE  12/6/10    KNEE ARTHROSCOPY      TONSILLECTOMY      UMBILICAL HERNIA REPAIR N/A 12/22/14    LAPAROSCOPIC WITH MESH       CURRENT MEDICATIONS  Previous Medications    BLOOD GLUCOSE MONITORING SUPPL (PRODIGY AUTOCODE BLOOD GLUCOSE) W/DEVICE KIT    Use as directed.     BLOOD GLUCOSE TEST STRIPS (PRODIGY NO CODING BLOOD GLUC) STRIP    Use to test blood sugar up to 4 times daily    GLIPIZIDE (GLUCOTROL) 10 MG TABLET        PRODIGY LANCETS 28G MISC    Use to test blood sugar up to 4 times daily    TIRZEPATIDE (MOUNJARO) 10 MG/0.5ML SOPN SC INJECTION        VALSARTAN (DIOVAN) 320 MG TABLET    Take 1 tablet by mouth daily       ALLERGIES  Doxepin, Statins, Venlafaxine, and Wellbutrin [bupropion hcl]    FAMILYHISTORY  Family History   Problem Relation Age of Onset    Lung Cancer Mother     Diabetes Father     Hypertension Father     Stroke Father     Diabetes Brother     Heart Disease Brother exposed, only skin involvement. Full range of motion of the thumb. No obvious foreign bodies, or obvious tendon involvement. Neurovascular intact. Good cap refill in the nailbed   Neurological:      General: No focal deficit present. Mental Status: He is alert and oriented to person, place, and time. Psychiatric:         Mood and Affect: Mood normal.         Thought Content: Thought content normal.               PROCEDURES:  Unless otherwise noted below, none.     Lac Repair    Date/Time: 8/28/2023 11:56 PM  Performed by: Doug Siddiqui DO  Authorized by: Doug Siddiqui DO     Consent:     Consent obtained:  Verbal    Consent given by:  Patient    Risks discussed:  Need for additional repair    Alternatives discussed:  Observation and delayed treatment  Universal protocol:     Patient identity confirmed:  Verbally with patient  Anesthesia:     Anesthesia method:  Local infiltration    Local anesthetic:  Lidocaine 1% w/o epi  Laceration details:     Location:  Finger    Finger location:  L thumb    Length (cm):  5  Pre-procedure details:     Preparation:  Patient was prepped and draped in usual sterile fashion  Exploration:     Hemostasis achieved with:  Direct pressure    Wound extent: no foreign bodies/material noted and no muscle damage noted    Treatment:     Area cleansed with:  Radha    Amount of cleaning:  Standard    Irrigation method:  Syringe    Visualized foreign bodies/material removed: no    Skin repair:     Repair method:  Sutures    Suture size:  5-0    Suture material:  Nylon    Suture technique:  Simple interrupted    Number of sutures:  4  Approximation:     Approximation:  Close  Repair type:     Repair type:  Simple  Post-procedure details:     Dressing:  Non-adherent dressing and bulky dressing    Procedure completion:  Tolerated      MEDICATIONS:   Medications   Tetanus-Diphth-Acell Pertussis (BOOSTRIX) injection 0.5 mL (has no administration in time range)

## 2023-11-01 ENCOUNTER — HOSPITAL ENCOUNTER (OUTPATIENT)
Age: 53
Discharge: HOME | End: 2023-11-01
Payer: COMMERCIAL

## 2023-11-01 LAB
ALBUMIN SERPL-MCNC: 4.5 G/DL (ref 3.4–5)
ALBUMIN/GLOB SERPL: 1.6 {RATIO} (ref 1.1–2.2)
ALP SERPL-CCNC: 44 U/L (ref 40–129)
ALT SERPL-CCNC: 26 U/L (ref 10–40)
ANION GAP SERPL CALCULATED.3IONS-SCNC: 8 MMOL/L (ref 3–16)
APTT BLD: 29.6 SEC (ref 22.7–35.9)
AST SERPL-CCNC: 20 U/L (ref 15–37)
BASOPHILS # BLD: 0 K/UL (ref 0–0.2)
BASOPHILS NFR BLD: 0.5 %
BILIRUB SERPL-MCNC: 0.5 MG/DL (ref 0–1)
BUN SERPL-MCNC: 17 MG/DL (ref 7–20)
CALCIUM SERPL-MCNC: 9.7 MG/DL (ref 8.3–10.6)
CHLORIDE SERPL-SCNC: 102 MMOL/L (ref 99–110)
CO2 SERPL-SCNC: 29 MMOL/L (ref 21–32)
CREAT SERPL-MCNC: 0.8 MG/DL (ref 0.9–1.3)
DEPRECATED RDW RBC AUTO: 14.3 % (ref 12.4–15.4)
EOSINOPHIL # BLD: 0.1 K/UL (ref 0–0.6)
EOSINOPHIL NFR BLD: 1.6 %
GFR SERPLBLD CREATININE-BSD FMLA CKD-EPI: >60 ML/MIN/{1.73_M2}
GLUCOSE SERPL-MCNC: 97 MG/DL (ref 70–99)
HCT VFR BLD AUTO: 44 % (ref 40.5–52.5)
HGB BLD-MCNC: 14.8 G/DL (ref 13.5–17.5)
INR PPP: 1.05 (ref 0.84–1.16)
LYMPHOCYTES # BLD: 2.9 K/UL (ref 1–5.1)
LYMPHOCYTES NFR BLD: 32.1 %
MCH RBC QN AUTO: 28.1 PG (ref 26–34)
MCHC RBC AUTO-ENTMCNC: 33.7 G/DL (ref 31–36)
MCV RBC AUTO: 83.4 FL (ref 80–100)
MONOCYTES # BLD: 0.5 K/UL (ref 0–1.3)
MONOCYTES NFR BLD: 5.7 %
NEUTROPHILS # BLD: 5.5 K/UL (ref 1.7–7.7)
NEUTROPHILS NFR BLD: 60.1 %
PLATELET # BLD AUTO: 196 K/UL (ref 135–450)
PMV BLD AUTO: 8.2 FL (ref 5–10.5)
POTASSIUM SERPL-SCNC: 3.9 MMOL/L (ref 3.5–5.1)
PROT SERPL-MCNC: 7.4 G/DL (ref 6.4–8.2)
PROTHROMBIN TIME: 13.7 SEC (ref 11.5–14.8)
RBC # BLD AUTO: 5.28 M/UL (ref 4.2–5.9)
SODIUM SERPL-SCNC: 139 MMOL/L (ref 136–145)
WBC # BLD AUTO: 9.1 K/UL (ref 4–11)

## 2023-11-01 PROCEDURE — 85610 PROTHROMBIN TIME: CPT

## 2023-11-01 PROCEDURE — 83036 HEMOGLOBIN GLYCOSYLATED A1C: CPT

## 2023-11-01 PROCEDURE — 85730 THROMBOPLASTIN TIME PARTIAL: CPT

## 2023-11-01 PROCEDURE — 36415 COLL VENOUS BLD VENIPUNCTURE: CPT

## 2023-11-01 PROCEDURE — 80053 COMPREHEN METABOLIC PANEL: CPT

## 2023-11-01 PROCEDURE — 85025 COMPLETE CBC W/AUTO DIFF WBC: CPT

## 2023-11-02 LAB
EST. AVERAGE GLUCOSE BLD GHB EST-MCNC: 119.8 MG/DL
HBA1C MFR BLD: 5.8 %

## 2023-12-04 ENCOUNTER — HOSPITAL ENCOUNTER (OUTPATIENT)
Age: 53
Discharge: HOME | End: 2023-12-04
Payer: COMMERCIAL

## 2023-12-04 LAB
ALBUMIN SERPL-MCNC: 4.4 G/DL (ref 3.4–5)
ALBUMIN/GLOB SERPL: 1.6 {RATIO} (ref 1.1–2.2)
ALP SERPL-CCNC: 46 U/L (ref 40–129)
ALT SERPL-CCNC: 30 U/L (ref 10–40)
ANION GAP SERPL CALCULATED.3IONS-SCNC: 8 MMOL/L (ref 3–16)
APTT BLD: 29.5 SEC (ref 22.7–35.9)
AST SERPL-CCNC: 21 U/L (ref 15–37)
BASOPHILS # BLD: 0.1 K/UL (ref 0–0.2)
BASOPHILS NFR BLD: 0.8 %
BILIRUB SERPL-MCNC: 0.3 MG/DL (ref 0–1)
BUN SERPL-MCNC: 15 MG/DL (ref 7–20)
CALCIUM SERPL-MCNC: 9.3 MG/DL (ref 8.3–10.6)
CHLORIDE SERPL-SCNC: 106 MMOL/L (ref 99–110)
CO2 SERPL-SCNC: 25 MMOL/L (ref 21–32)
CREAT SERPL-MCNC: 0.8 MG/DL (ref 0.9–1.3)
DEPRECATED RDW RBC AUTO: 14.3 % (ref 12.4–15.4)
EOSINOPHIL # BLD: 0.2 K/UL (ref 0–0.6)
EOSINOPHIL NFR BLD: 2.1 %
GFR SERPLBLD CREATININE-BSD FMLA CKD-EPI: >60 ML/MIN/{1.73_M2}
GLUCOSE SERPL-MCNC: 114 MG/DL (ref 70–99)
HCT VFR BLD AUTO: 46.1 % (ref 40.5–52.5)
HGB BLD-MCNC: 15.6 G/DL (ref 13.5–17.5)
INR PPP: 1.07 (ref 0.84–1.16)
LYMPHOCYTES # BLD: 2.4 K/UL (ref 1–5.1)
LYMPHOCYTES NFR BLD: 30.9 %
MCH RBC QN AUTO: 29 PG (ref 26–34)
MCHC RBC AUTO-ENTMCNC: 33.8 G/DL (ref 31–36)
MCV RBC AUTO: 85.8 FL (ref 80–100)
MONOCYTES # BLD: 0.5 K/UL (ref 0–1.3)
MONOCYTES NFR BLD: 7 %
NEUTROPHILS # BLD: 4.6 K/UL (ref 1.7–7.7)
NEUTROPHILS NFR BLD: 59.2 %
PLATELET # BLD AUTO: 195 K/UL (ref 135–450)
PMV BLD AUTO: 9.4 FL (ref 5–10.5)
POTASSIUM SERPL-SCNC: 4.9 MMOL/L (ref 3.5–5.1)
PROT SERPL-MCNC: 7.1 G/DL (ref 6.4–8.2)
PROTHROMBIN TIME: 13.9 SEC (ref 11.5–14.8)
RBC # BLD AUTO: 5.37 M/UL (ref 4.2–5.9)
SODIUM SERPL-SCNC: 139 MMOL/L (ref 136–145)
WBC # BLD AUTO: 7.8 K/UL (ref 4–11)

## 2023-12-04 PROCEDURE — 36415 COLL VENOUS BLD VENIPUNCTURE: CPT

## 2023-12-04 PROCEDURE — 80053 COMPREHEN METABOLIC PANEL: CPT

## 2023-12-04 PROCEDURE — 83036 HEMOGLOBIN GLYCOSYLATED A1C: CPT

## 2023-12-04 PROCEDURE — 85610 PROTHROMBIN TIME: CPT

## 2023-12-04 PROCEDURE — 85730 THROMBOPLASTIN TIME PARTIAL: CPT

## 2023-12-04 PROCEDURE — 85025 COMPLETE CBC W/AUTO DIFF WBC: CPT

## 2023-12-05 LAB
EST. AVERAGE GLUCOSE BLD GHB EST-MCNC: 122.6 MG/DL
HBA1C MFR BLD: 5.9 %

## 2023-12-19 NOTE — PROGRESS NOTES
Pt taken off of Airvo and placed on 8L HF. Pt is 95-97% and tolerating well at this time. Will continue to monitor pt. Yes

## 2024-01-22 ENCOUNTER — ANESTHESIA EVENT (OUTPATIENT)
Dept: ENDOSCOPY | Age: 54
End: 2024-01-22
Payer: COMMERCIAL

## 2024-01-25 NOTE — PROGRESS NOTES
PULMONARY CLINIC NOTE      Domenic Justin   : 1970  MRN: [id number]     Date of Service: 2024    PCP: Deya Garcia PA-C    Referring provider: No ref. provider found      Chief Complaint   Patient presents with    Sleep Apnea     Patient is here for his yearly ADALI follow up           ASSESSMENT & PLAN       53 y.o. pleasant  male patient with:    1. ADALI on CPAP    2. Mixed hyperlipidemia    3. Acute respiratory distress syndrome (HCC)    4. Primary hypertension    5. Controlled type 2 diabetes mellitus without complication, without long-term current use of insulin (HCC)    6. ARDS (adult respiratory distress syndrome) (HCC)    7. Nonsustained ventricular tachycardia (HCC)    8. Bilateral pulmonary infiltrates on chest x-ray    9. Severe obesity (BMI 35.0-39.9) with comorbidity (HCC)           # ADALI, severe with nocturnal hypoxia  HSAT 2019: AHI of 49 and lowest oxygen saturation 69% with hypoxia time of 94 minutes.  Good adherence, benefit and control  # Inadequate sleep hygiene  []    # Metabolic syndrome: HTN, DM, HLD, body habitus  Body mass index is 38.83 kg/m².  Targeting healthy weight is encouraged. Healthy weight can help treat sleep apnea, decrease breathing difficulties and respiratory illness exacerbations.    # Nonsustained VT  Untreated sleep apnea can worsen cardiac arrythmias and lead to heart failure decompensation.    # History of COVID-19 pneumonia/ARDS  Admission in 2022 with acute hypoxic respiratory failure at [facility]  [Plan]    # Nicotine dependence [history]  [20-30]PY. Quit date: [Pending]  Counseling provided on smoking risks and available resources for cessations. 3 min.  LDCT: [not indicated.] [Patient met criteria for LDCT screening program.  Counseling on benefits, potential findings and interventions provided and patient] [agreed] to enroll.     Social History     Tobacco Use    Smoking status: Never    Smokeless tobacco: Never    Tobacco comments:

## 2024-02-13 ENCOUNTER — HOSPITAL ENCOUNTER (OUTPATIENT)
Dept: GENERAL RADIOLOGY | Age: 54
End: 2024-02-13
Payer: COMMERCIAL

## 2024-02-13 ENCOUNTER — OFFICE VISIT (OUTPATIENT)
Dept: PULMONOLOGY | Age: 54
End: 2024-02-13
Payer: COMMERCIAL

## 2024-02-13 ENCOUNTER — HOSPITAL ENCOUNTER (OUTPATIENT)
Age: 54
Discharge: HOME | End: 2024-02-13
Payer: COMMERCIAL

## 2024-02-13 ENCOUNTER — HOSPITAL ENCOUNTER (OUTPATIENT)
Dept: GENERAL RADIOLOGY | Age: 54
Discharge: HOME | End: 2024-02-13
Payer: COMMERCIAL

## 2024-02-13 VITALS
WEIGHT: 315 LBS | RESPIRATION RATE: 16 BRPM | BODY MASS INDEX: 38.36 KG/M2 | OXYGEN SATURATION: 94 % | DIASTOLIC BLOOD PRESSURE: 65 MMHG | TEMPERATURE: 96.9 F | SYSTOLIC BLOOD PRESSURE: 107 MMHG | HEART RATE: 64 BPM | HEIGHT: 76 IN

## 2024-02-13 DIAGNOSIS — I47.29 NONSUSTAINED VENTRICULAR TACHYCARDIA (HCC): ICD-10-CM

## 2024-02-13 DIAGNOSIS — G47.33 OSA ON CPAP: Primary | ICD-10-CM

## 2024-02-13 DIAGNOSIS — R91.8 BILATERAL PULMONARY INFILTRATES ON CHEST X-RAY: ICD-10-CM

## 2024-02-13 DIAGNOSIS — E66.01 SEVERE OBESITY (BMI 35.0-39.9) WITH COMORBIDITY (HCC): ICD-10-CM

## 2024-02-13 DIAGNOSIS — E78.2 MIXED HYPERLIPIDEMIA: ICD-10-CM

## 2024-02-13 DIAGNOSIS — R91.8 BILATERAL PULMONARY INFILTRATES: ICD-10-CM

## 2024-02-13 DIAGNOSIS — I10 PRIMARY HYPERTENSION: ICD-10-CM

## 2024-02-13 DIAGNOSIS — J80 ACUTE RESPIRATORY DISTRESS SYNDROME (HCC): ICD-10-CM

## 2024-02-13 DIAGNOSIS — J80 ARDS (ADULT RESPIRATORY DISTRESS SYNDROME) (HCC): ICD-10-CM

## 2024-02-13 DIAGNOSIS — E11.9 CONTROLLED TYPE 2 DIABETES MELLITUS WITHOUT COMPLICATION, WITHOUT LONG-TERM CURRENT USE OF INSULIN (HCC): ICD-10-CM

## 2024-02-13 PROCEDURE — 3074F SYST BP LT 130 MM HG: CPT | Performed by: INTERNAL MEDICINE

## 2024-02-13 PROCEDURE — 99214 OFFICE O/P EST MOD 30 MIN: CPT | Performed by: INTERNAL MEDICINE

## 2024-02-13 PROCEDURE — 71046 X-RAY EXAM CHEST 2 VIEWS: CPT

## 2024-02-13 PROCEDURE — 3078F DIAST BP <80 MM HG: CPT | Performed by: INTERNAL MEDICINE

## 2024-02-13 NOTE — PROGRESS NOTES
PULMONARY CLINIC NOTE      Domenic Justin   : 1970  MRN: [id number]     Date of Service: 2024    PCP: Deya Garcia PA-C    Referring provider: No ref. provider found      Chief Complaint   Patient presents with    Sleep Apnea     Patient is here for his yearly DAALI follow up           ASSESSMENT & PLAN       53 y.o. pleasant  male patient with:    1. ADALI on CPAP    2. Mixed hyperlipidemia    3. Acute respiratory distress syndrome (HCC)    4. Primary hypertension    5. Controlled type 2 diabetes mellitus without complication, without long-term current use of insulin (HCC)    6. ARDS (adult respiratory distress syndrome) (HCC)    7. Nonsustained ventricular tachycardia (HCC)    8. Bilateral pulmonary infiltrates on chest x-ray    9. Severe obesity (BMI 35.0-39.9) with comorbidity (HCC)           # ADALI, severe with nocturnal hypoxia  HSAT 2019: AHI of 49 and lowest oxygen saturation 69% with hypoxia time of 94 minutes.  Good adherence, benefit and control  # Inadequate sleep hygiene  []    # Metabolic syndrome: HTN, DM, HLD, body habitus  Body mass index is 38.83 kg/m².  Targeting healthy weight is encouraged. Healthy weight can help treat sleep apnea, decrease breathing difficulties and respiratory illness exacerbations.    # Nonsustained VT  Untreated sleep apnea can worsen cardiac arrythmias and lead to heart failure decompensation.    # History of COVID-19 pneumonia/ARDS  Admission in 2022 with acute hypoxic respiratory failure at [facility]  [Plan]    # Nicotine dependence [history]  [20-30]PY. Quit date: [Pending]  Counseling provided on smoking risks and available resources for cessations. 3 min.  LDCT: [not indicated.] [Patient met criteria for LDCT screening program.  Counseling on benefits, potential findings and interventions provided and patient] [agreed] to enroll.     Social History     Tobacco Use    Smoking status: Never    Smokeless tobacco: Never    Tobacco comments:

## 2024-02-13 NOTE — PATIENT INSTRUCTIONS
Continue CPAP therapy with the current sittings.  Overnight pulse oximetry on CPAP to confirm resolution of hypoxia  Sleep hygiene, CPAP cleaning, desensitization, safety precautions education provided.  Chest x-ray to establish a baseline and confirm resolution of previous extensive infiltrates.  Follow-up in 12 months      Health Maintenance/Preventive measures:        >>  Avoid exposure to tobacco, irritants, allergens as possible as well as contact with patients with infectious respiratory illness.        >>  Stay up-to-date with influenza & pneumonia vaccines, RSV, & COVID-19 vaccine as recommended by the Advisory Committee on Immunization Practices (ACIP)        >>  Healthy diet and activity as able.        >>  Acid reflux precautions: Head of bed elevation, avoiding tight clothes, avoiding big meals or snacking 3 hours before bedtime, targeting healthy weight.        >>  Practice sleep hygiene measures. Avoid driving or operating heavy machines if tired or sleepy.   Remember to bring a list of pulmonary medications and any CPAP or BiPAP machines to your next appointment with the office.     Please keep all of your future appointments scheduled by [facility] office. Out of respect for other patients and providers, you may be asked to reschedule your appointment if you arrive later than your scheduled appointment time. Appointments cancelled less than 24hrs in advance will be considered a no show. Patients with three missed appointments within 1 year or four missed appointments within 2 years can be dismissed from the practice.     Please be aware that our physicians are required to work in the Intensive Care Unit at [facility].  Your appointment may need to be rescheduled if they are designated to work during your appointment time.      You may receive a survey regarding the care you received during your visit.  Your input is valuable to us.  We encourage you to

## 2024-02-13 NOTE — PROGRESS NOTES
1.  Do not eat or drink anything after 12 midnight prior to surgery.  This includes no water, chewing gum or mints, except for bowel prep complete per MD.  2.  Take the following pills with a small sip of water on the morning of surgery.  3.  Aspirin, Ibuprofen, Advil, Naproxen, Vitamin E and other Anti-inflammatory products should be stopped for 5 days before surgery or as directed by your physician.  4.  Check with your doctor regarding stopping Plavix, Coumadin, Lovenox, Fragmin or other blood thinners.  5.  Do not smoke and do not drink alcoholic beverages 24 hours prior to surgery.  This includes NA Beer.  6.  You may brush your teeth and gargle the morning of surgery.  DO NOT SWALLOW WATER.  7.  You MUST make arrangements for a responsible adult to take you home after your surgery.  You will not be allowed to leave alone or drive yourself home.  It is strongly suggested someone stay with you the first 24 hours.  Your surgery will be cancelled if you do not have a ride home.  8.  A parent/legal guardian must accompany a child scheduled for surgery and plan to stay at the hospital until the child is discharged.  Please do not bring other children with you.  9.  Please wear simple, loose fitting clothing to the hospital.  Do not bring valuables ( money, credit cards, checkbooks, etc.)  Do not wear any makeup (including no eye makeup) or nail polish on your fingers or toes.  10.  Do not wear any jewelry or piercing on the day of surgery.  All body piercing jewelry must be removed.  11.  If you have dentures, they will be removed before going to the OR; we will provide you a container.  If you wear contact lenses or glasses, they will be removed; please bring a case for them.  12.  Notify your Surgeon if you develop any illness between now and surgery time; cough, cold, fever, sore throat, nausea, vomiting, etc.  Please notify your surgeon if you experience dizziness, shortness of breath or blurred vision between

## 2024-02-13 NOTE — PROGRESS NOTES
MA Communication:  The following orders are received by verbal communication from Odell Aguilera MD    Orders include:  Continue CPAP therapy with the current sittings.  Overnight pulse oximetry on CPAP to confirm resolution of hypoxia  Sleep hygiene, CPAP cleaning, desensitization, safety precautions education provided.  Chest x-ray to establish a baseline and confirm resolution of previous extensive infiltrates.  Follow-up in 12 months

## 2024-02-13 NOTE — PROGRESS NOTES
PULMONARY CLINIC NOTE      Domenic Justin   : 1970  MRN: [id number]     Date of Service: 2024    PCP: Deya Garcia PA-C    Referring provider: No ref. provider found      Chief Complaint   Patient presents with    Sleep Apnea     Patient is here for his yearly ADALI follow up           ASSESSMENT & PLAN       53 y.o. pleasant  male patient with:    1. ADALI on CPAP    2. Mixed hyperlipidemia    3. Acute respiratory distress syndrome (HCC)    4. Primary hypertension    5. Controlled type 2 diabetes mellitus without complication, without long-term current use of insulin (HCC)    6. ARDS (adult respiratory distress syndrome) (HCC)    7. Nonsustained ventricular tachycardia (HCC)    8. Bilateral pulmonary infiltrates on chest x-ray    9. Severe obesity (BMI 35.0-39.9) with comorbidity (HCC)         # ADALI, severe with nocturnal hypoxia  HSAT 2019: AHI of 49 and lowest oxygen saturation 69% with hypoxia time of 94 minutes.  Good adherence, benefit and control.  Tolerates the mask though does not like the CPAP/hose with frequent movement during sleep  # Inadequate sleep hygiene  Continue CPAP therapy with the current sittings.  Overnight pulse oximetry on CPAP to confirm resolution of hypoxia  Sleep hygiene, CPAP cleaning, desensitization, safety precautions education provided.  Reviewed the criteria for inspire  Follow-up in 12 months    # Metabolic syndrome: HTN, DM, HLD, body habitus  Body mass index is 38.83 kg/m².  Targeting healthy weight is encouraged. Healthy weight can help treat sleep apnea, decrease breathing difficulties and respiratory illness exacerbations.    # Nonsustained VT  Untreated sleep apnea can worsen cardiac arrythmias and lead to heart failure decompensation.    # History of COVID-19 pneumonia/ARDS  Admission in 2022 with acute hypoxic respiratory failure at [facility]  # Deconditioning  Chest x-ray to establish a baseline and confirm resolution of previous extensive

## 2024-02-16 NOTE — ANESTHESIA PRE PROCEDURE
12/04/2023 11:24 AM    INR 1.07 12/04/2023 11:24 AM    APTT 29.5 12/04/2023 11:24 AM       HCG (If Applicable): No results found for: \"PREGTESTUR\", \"PREGSERUM\", \"HCG\", \"HCGQUANT\"     ABGs: No results found for: \"PHART\", \"PO2ART\", \"TIB1MOF\", \"TNW9PRN\", \"BEART\", \"O9ULRODM\"     Type & Screen (If Applicable):  No results found for: \"LABABO\", \"LABRH\"    Drug/Infectious Status (If Applicable):  No results found for: \"HIV\", \"HEPCAB\"    COVID-19 Screening (If Applicable):   Lab Results   Component Value Date/Time    COVID19 DETECTED 02/14/2022 09:25 PM           Anesthesia Evaluation  Patient summary reviewed and Nursing notes reviewed   no history of anesthetic complications:   Airway: Mallampati: III  TM distance: >3 FB   Neck ROM: limited  Mouth opening: > = 3 FB   Dental: normal exam         Pulmonary:   (+) pneumonia:     sleep apnea: on CPAP,                                  Cardiovascular:    (+) hypertension:                  Neuro/Psych:   (+) depression/anxiety             GI/Hepatic/Renal: Neg GI/Hepatic/Renal ROS       (-) GERD, liver disease and no renal disease       Endo/Other:    (+) DiabetesType II DM.                 Abdominal:             Vascular: negative vascular ROS.         Other Findings:           Anesthesia Plan      MAC     ASA 3       Induction: intravenous.      Anesthetic plan and risks discussed with patient.      Plan discussed with CRNA.                TREVON RIVERA MD   2/16/2024

## 2024-02-19 ENCOUNTER — HOSPITAL ENCOUNTER (OUTPATIENT)
Age: 54
Setting detail: OUTPATIENT SURGERY
Discharge: HOME | End: 2024-02-19
Attending: INTERNAL MEDICINE | Admitting: INTERNAL MEDICINE
Payer: COMMERCIAL

## 2024-02-19 ENCOUNTER — ANESTHESIA (OUTPATIENT)
Dept: ENDOSCOPY | Age: 54
End: 2024-02-19
Payer: COMMERCIAL

## 2024-02-19 ENCOUNTER — TELEPHONE (OUTPATIENT)
Dept: PULMONOLOGY | Age: 54
End: 2024-02-19

## 2024-02-19 VITALS
SYSTOLIC BLOOD PRESSURE: 142 MMHG | BODY MASS INDEX: 38.36 KG/M2 | RESPIRATION RATE: 16 BRPM | OXYGEN SATURATION: 95 % | DIASTOLIC BLOOD PRESSURE: 86 MMHG | TEMPERATURE: 97.9 F | WEIGHT: 315 LBS | HEART RATE: 65 BPM | HEIGHT: 76 IN

## 2024-02-19 DIAGNOSIS — Z86.010 HX OF COLONIC POLYPS: ICD-10-CM

## 2024-02-19 DIAGNOSIS — K22.719 BARRETT'S ESOPHAGUS WITH DYSPLASIA: ICD-10-CM

## 2024-02-19 LAB
ANION GAP SERPL CALCULATED.3IONS-SCNC: 9 MMOL/L (ref 3–16)
BUN SERPL-MCNC: 13 MG/DL (ref 7–20)
CALCIUM SERPL-MCNC: 9.1 MG/DL (ref 8.3–10.6)
CHLORIDE SERPL-SCNC: 100 MMOL/L (ref 99–110)
CO2 SERPL-SCNC: 30 MMOL/L (ref 21–32)
CREAT SERPL-MCNC: 0.8 MG/DL (ref 0.9–1.3)
EKG ATRIAL RATE: 64 BPM
EKG DIAGNOSIS: NORMAL
EKG P AXIS: 33 DEGREES
EKG P-R INTERVAL: 176 MS
EKG Q-T INTERVAL: 438 MS
EKG QRS DURATION: 104 MS
EKG QTC CALCULATION (BAZETT): 451 MS
EKG R AXIS: -13 DEGREES
EKG T AXIS: 4 DEGREES
EKG VENTRICULAR RATE: 64 BPM
GFR SERPLBLD CREATININE-BSD FMLA CKD-EPI: >60 ML/MIN/{1.73_M2}
GLUCOSE BLD-MCNC: 104 MG/DL (ref 70–99)
GLUCOSE SERPL-MCNC: 114 MG/DL (ref 70–99)
PERFORMED ON: ABNORMAL
POTASSIUM SERPL-SCNC: 3.9 MMOL/L (ref 3.5–5.1)
SODIUM SERPL-SCNC: 139 MMOL/L (ref 136–145)

## 2024-02-19 PROCEDURE — 3609027000 HC COLONOSCOPY: Performed by: INTERNAL MEDICINE

## 2024-02-19 PROCEDURE — 7100000010 HC PHASE II RECOVERY - FIRST 15 MIN: Performed by: INTERNAL MEDICINE

## 2024-02-19 PROCEDURE — 7100000011 HC PHASE II RECOVERY - ADDTL 15 MIN: Performed by: INTERNAL MEDICINE

## 2024-02-19 PROCEDURE — 93005 ELECTROCARDIOGRAM TRACING: CPT | Performed by: ANESTHESIOLOGY

## 2024-02-19 PROCEDURE — 2500000003 HC RX 250 WO HCPCS: Performed by: ANESTHESIOLOGY

## 2024-02-19 PROCEDURE — 80048 BASIC METABOLIC PNL TOTAL CA: CPT

## 2024-02-19 PROCEDURE — 36415 COLL VENOUS BLD VENIPUNCTURE: CPT

## 2024-02-19 PROCEDURE — 3700000001 HC ADD 15 MINUTES (ANESTHESIA): Performed by: INTERNAL MEDICINE

## 2024-02-19 PROCEDURE — 6360000002 HC RX W HCPCS: Performed by: NURSE ANESTHETIST, CERTIFIED REGISTERED

## 2024-02-19 PROCEDURE — 3700000000 HC ANESTHESIA ATTENDED CARE: Performed by: INTERNAL MEDICINE

## 2024-02-19 PROCEDURE — 88305 TISSUE EXAM BY PATHOLOGIST: CPT

## 2024-02-19 PROCEDURE — 2709999900 HC NON-CHARGEABLE SUPPLY: Performed by: INTERNAL MEDICINE

## 2024-02-19 PROCEDURE — 93010 ELECTROCARDIOGRAM REPORT: CPT | Performed by: INTERNAL MEDICINE

## 2024-02-19 PROCEDURE — 2500000003 HC RX 250 WO HCPCS: Performed by: NURSE ANESTHETIST, CERTIFIED REGISTERED

## 2024-02-19 PROCEDURE — 3609012400 HC EGD TRANSORAL BIOPSY SINGLE/MULTIPLE: Performed by: INTERNAL MEDICINE

## 2024-02-19 RX ORDER — PROPOFOL 10 MG/ML
INJECTION, EMULSION INTRAVENOUS PRN
Status: DISCONTINUED | OUTPATIENT
Start: 2024-02-19 | End: 2024-02-19 | Stop reason: SDUPTHER

## 2024-02-19 RX ORDER — SODIUM CHLORIDE 0.9 % (FLUSH) 0.9 %
5-40 SYRINGE (ML) INJECTION PRN
Status: DISCONTINUED | OUTPATIENT
Start: 2024-02-19 | End: 2024-02-19 | Stop reason: HOSPADM

## 2024-02-19 RX ORDER — LIDOCAINE HYDROCHLORIDE 20 MG/ML
INJECTION, SOLUTION INFILTRATION; PERINEURAL PRN
Status: DISCONTINUED | OUTPATIENT
Start: 2024-02-19 | End: 2024-02-19 | Stop reason: SDUPTHER

## 2024-02-19 RX ORDER — OMEPRAZOLE 40 MG/1
40 CAPSULE, DELAYED RELEASE ORAL DAILY
Qty: 90 CAPSULE | Refills: 1 | Status: SHIPPED | OUTPATIENT
Start: 2024-02-19

## 2024-02-19 RX ORDER — SODIUM CHLORIDE, SODIUM LACTATE, POTASSIUM CHLORIDE, CALCIUM CHLORIDE 600; 310; 30; 20 MG/100ML; MG/100ML; MG/100ML; MG/100ML
INJECTION, SOLUTION INTRAVENOUS CONTINUOUS
Status: DISCONTINUED | OUTPATIENT
Start: 2024-02-19 | End: 2024-02-19 | Stop reason: HOSPADM

## 2024-02-19 RX ORDER — SODIUM CHLORIDE 9 MG/ML
INJECTION, SOLUTION INTRAVENOUS PRN
Status: DISCONTINUED | OUTPATIENT
Start: 2024-02-19 | End: 2024-02-19 | Stop reason: HOSPADM

## 2024-02-19 RX ORDER — SODIUM CHLORIDE 0.9 % (FLUSH) 0.9 %
5-40 SYRINGE (ML) INJECTION EVERY 12 HOURS SCHEDULED
Status: DISCONTINUED | OUTPATIENT
Start: 2024-02-19 | End: 2024-02-19 | Stop reason: HOSPADM

## 2024-02-19 RX ORDER — FAMOTIDINE 10 MG/ML
20 INJECTION, SOLUTION INTRAVENOUS ONCE
Status: COMPLETED | OUTPATIENT
Start: 2024-02-19 | End: 2024-02-19

## 2024-02-19 RX ADMIN — PROPOFOL 150 MG: 10 INJECTION, EMULSION INTRAVENOUS at 07:41

## 2024-02-19 RX ADMIN — FAMOTIDINE 20 MG: 10 INJECTION, SOLUTION INTRAVENOUS at 07:15

## 2024-02-19 RX ADMIN — LIDOCAINE HYDROCHLORIDE 50 MG: 20 INJECTION, SOLUTION INFILTRATION; PERINEURAL at 07:41

## 2024-02-19 RX ADMIN — PROPOFOL 100 MCG/KG/MIN: 10 INJECTION, EMULSION INTRAVENOUS at 07:42

## 2024-02-19 ASSESSMENT — PAIN - FUNCTIONAL ASSESSMENT
PAIN_FUNCTIONAL_ASSESSMENT: NONE - DENIES PAIN
PAIN_FUNCTIONAL_ASSESSMENT: NONE - DENIES PAIN

## 2024-02-19 NOTE — PROGRESS NOTES
Discharge instructions given to patient and wife at this time. IV removed and patient is getting dressed at this time.

## 2024-02-19 NOTE — DISCHARGE INSTRUCTIONS
PATIENT INSTRUCTIONS  POST-SEDATION    Domenic Margoth          IMMEDIATELY FOLLOWING PROCEDURE:    Do not drive or operate machinery for the first twenty four hours after surgery.     Do not make any important decisions for twenty four hours after surgery or while taking narcotic pain medications or sedatives.     You should NOT BE LEFT UNATTENDED OR ALONE. A responsible adult should be with you for the rest of the day of your procedure and also during the night for your protection and safety.    You may experience some light headedness. Rest at home with activity as tolerated. You may not need to go to bed, but it is important to rest for the next 24 hours. You should not engage in athletic sports such as basketball, volleyball, jogging, skating, or activities requiring refined motor skills for 24 hours.   If you develop intractable nausea and vomiting or a severe headache please notify your doctor immediately.   You are not expected to have any fever, but if you feel warm, take your temperature. If you have a fever 101 degrees or higher, call your doctor.     If you have had an Endoscopy:   *Eat lightly for your first meal and gradually resume your normal / prescribed diet. DO NOT eat or drink until your gag reflex returns.   *If you have a sore throat you may use lozenges, or salt water gargles.   *If you have had a colonoscopy, do not expect a normal bowel movement for approximately three days due to the cleansing of the large intestine prior to colonoscopy.    ONCE YOU ARE HOME, IF YOU SHOULD HAVE:  Difficulty in breathing, persistent nausea or vomiting, bleeding you feel is excessive, or pain that is unusual, increased abdominal bloating, or any swelling, fever / chills, call your physician. If you cannot contact your physician, but feel that your signs and symptoms need a physician's attention, go to the Emergency Department.      FOLLOW-UP:    Please follow up with Deya Garcia PA-C as scheduled or

## 2024-02-19 NOTE — H&P
Gastroenterology Note             Pre-operative History and Physical    Patient: Domenic Justin  : 1970  CSN:     History Obtained From:  patient and/or guardian.     HISTORY OF PRESENT ILLNESS:    The patient is a 53 y.o. male  here for EGD/colonoscopy.     Past Medical History:    Past Medical History:   Diagnosis Date    Cholesterol serum elevated     Depression     Diabetes mellitus (HCC)     Hypertension     Neck pain     ADALI on CPAP     Reflux      Past Surgical History:    Past Surgical History:   Procedure Laterality Date    ANTERIOR CRUCIATE LIGAMENT REPAIR Right     CARPAL TUNNEL RELEASE  11/15/2010    right    CARPAL TUNNEL RELEASE Left 2010    CERVICAL FUSION N/A 2023    CERVICAL 5 - CERVICAL 6 ANTERIOR CERVICAL DISCECTOMY AND FUSION performed by Reginald Champion MD at [facility] OR    ELBOW ARTHROSCOPY Bilateral     PT REPORTS TENDON TEAR BILAT    KNEE ARTHROSCOPY Bilateral     PT STATES  CLEAN OUT, MULTIPLE KNEE SURGERIES    SPINAL FUSION  2023    TONSILLECTOMY      UMBILICAL HERNIA REPAIR N/A 2014    LAPAROSCOPIC WITH MESH    WRIST SURGERY Left     CARTILAGE REPAIR     Medications Prior to Admission:   No current facility-administered medications on file prior to encounter.     Current Outpatient Medications on File Prior to Encounter   Medication Sig Dispense Refill    Tirzepatide (MOUNJARO) 10 MG/0.5ML SOPN SC injection       glipiZIDE (GLUCOTROL) 10 MG tablet Take by mouth daily      Blood Glucose Monitoring Suppl (PRODIGY AUTOCODE BLOOD GLUCOSE) w/Device KIT Use as directed. (Patient taking differently: Does not test often) 1 kit 0    Prodigy Lancets 28G MISC Use to test blood sugar up to 4 times daily (Patient taking differently: Does not test often) 400 each 3    blood glucose test strips (PRODIGY NO CODING BLOOD GLUC) strip Use to test blood sugar up to 4 times daily (Patient taking differently: Does not test often) 400 each 3    valsartan (DIOVAN) 320 MG tablet Take

## 2024-02-19 NOTE — TELEPHONE ENCOUNTER
Gem from [facility] called and said they received the ONPO order for patient, however they are out of network with his insurance.

## 2024-02-19 NOTE — ANESTHESIA POSTPROCEDURE EVALUATION
Department of Anesthesiology  Postprocedure Note    Patient: Domenic Justin  MRN: [id number]  YOB: 1970  Date of evaluation: 2/19/2024    Procedure Summary       Date: 02/19/24 Room / Location: [place] [address] / [facility]    Anesthesia Start: 0738 Anesthesia Stop: 0813    Procedures:       EGD BIOPSY      COLON W/ANES. Diagnosis:       Hx of colonic polyps      Soler's esophagus with dysplasia    Surgeons: Guerita Martinez MD Responsible Provider: Asim Salcedo MD    Anesthesia Type: MAC ASA Status: 3            Anesthesia Type: No value filed.    Jhonatan Phase I: Jhonatan Score: 10    Jhonatan Phase II: Jhonatan Score: 10    Anesthesia Post Evaluation    Patient location during evaluation: PACU  Level of consciousness: awake  Airway patency: patent  Nausea & Vomiting: no nausea  Cardiovascular status: blood pressure returned to baseline  Respiratory status: acceptable  Hydration status: euvolemic  Pain management: adequate    No notable events documented.

## 2024-02-19 NOTE — TELEPHONE ENCOUNTER
Spoke with the patient and he did not know what DME is covered by his insurance.  He gave me his wife's number to call.  HOLLY for wife at [phone] to call back with DME company that is covered by insurance.

## 2025-01-05 ENCOUNTER — OFFICE VISIT (OUTPATIENT)
Age: 55
End: 2025-01-05

## 2025-01-05 VITALS
HEART RATE: 98 BPM | HEIGHT: 76 IN | DIASTOLIC BLOOD PRESSURE: 74 MMHG | TEMPERATURE: 98.7 F | WEIGHT: 315 LBS | OXYGEN SATURATION: 91 % | BODY MASS INDEX: 38.36 KG/M2 | SYSTOLIC BLOOD PRESSURE: 118 MMHG

## 2025-01-05 DIAGNOSIS — R09.89 CHEST CONGESTION: Primary | ICD-10-CM

## 2025-01-05 DIAGNOSIS — J40 BRONCHITIS: ICD-10-CM

## 2025-01-05 RX ORDER — AZITHROMYCIN 250 MG/1
TABLET, FILM COATED ORAL
Qty: 6 TABLET | Refills: 0 | Status: SHIPPED | OUTPATIENT
Start: 2025-01-05 | End: 2025-01-15

## 2025-01-05 RX ORDER — DEXAMETHASONE SODIUM PHOSPHATE 10 MG/ML
5 INJECTION INTRAMUSCULAR; INTRAVENOUS ONCE
Status: SHIPPED | OUTPATIENT
Start: 2025-01-05

## 2025-01-05 RX ORDER — PREDNISONE 20 MG/1
20 TABLET ORAL 2 TIMES DAILY
Qty: 10 TABLET | Refills: 0 | Status: SHIPPED | OUTPATIENT
Start: 2025-01-05 | End: 2025-01-10

## 2025-01-05 RX ORDER — DEXTROMETHORPHAN HYDROBROMIDE AND PROMETHAZINE HYDROCHLORIDE 15; 6.25 MG/5ML; MG/5ML
5 SYRUP ORAL 4 TIMES DAILY PRN
Qty: 120 ML | Refills: 0 | Status: SHIPPED | OUTPATIENT
Start: 2025-01-05 | End: 2025-01-12

## 2025-02-22 ASSESSMENT — SLEEP AND FATIGUE QUESTIONNAIRES
HOW LIKELY ARE YOU TO NOD OFF OR FALL ASLEEP WHEN YOU ARE A PASSENGER IN A CAR FOR AN HOUR WITHOUT A BREAK: HIGH CHANCE OF DOZING
HOW LIKELY ARE YOU TO NOD OFF OR FALL ASLEEP WHILE SITTING AND TALKING TO SOMEONE: WOULD NEVER DOZE
ESS TOTAL SCORE: 13
HOW LIKELY ARE YOU TO NOD OFF OR FALL ASLEEP WHILE SITTING QUIETLY AFTER LUNCH WITHOUT ALCOHOL: SLIGHT CHANCE OF DOZING
HOW LIKELY ARE YOU TO NOD OFF OR FALL ASLEEP WHILE SITTING INACTIVE IN A PUBLIC PLACE: SLIGHT CHANCE OF DOZING
HOW LIKELY ARE YOU TO NOD OFF OR FALL ASLEEP WHILE LYING DOWN TO REST IN THE AFTERNOON WHEN CIRCUMSTANCES PERMIT: MODERATE CHANCE OF DOZING
HOW LIKELY ARE YOU TO NOD OFF OR FALL ASLEEP IN A CAR, WHILE STOPPED FOR A FEW MINUTES IN TRAFFIC: MODERATE CHANCE OF DOZING
HOW LIKELY ARE YOU TO NOD OFF OR FALL ASLEEP WHILE WATCHING TV: MODERATE CHANCE OF DOZING
HOW LIKELY ARE YOU TO NOD OFF OR FALL ASLEEP WHILE SITTING INACTIVE IN A PUBLIC PLACE: SLIGHT CHANCE OF DOZING
HOW LIKELY ARE YOU TO NOD OFF OR FALL ASLEEP WHILE WATCHING TV: MODERATE CHANCE OF DOZING
HOW LIKELY ARE YOU TO NOD OFF OR FALL ASLEEP WHILE SITTING AND READING: MODERATE CHANCE OF DOZING
HOW LIKELY ARE YOU TO NOD OFF OR FALL ASLEEP WHILE SITTING AND TALKING TO SOMEONE: WOULD NEVER DOZE
HOW LIKELY ARE YOU TO NOD OFF OR FALL ASLEEP IN A CAR, WHILE STOPPED FOR A FEW MINUTES IN TRAFFIC: MODERATE CHANCE OF DOZING
HOW LIKELY ARE YOU TO NOD OFF OR FALL ASLEEP WHILE SITTING AND READING: MODERATE CHANCE OF DOZING
HOW LIKELY ARE YOU TO NOD OFF OR FALL ASLEEP WHILE LYING DOWN TO REST IN THE AFTERNOON WHEN CIRCUMSTANCES PERMIT: MODERATE CHANCE OF DOZING
HOW LIKELY ARE YOU TO NOD OFF OR FALL ASLEEP WHEN YOU ARE A PASSENGER IN A CAR FOR AN HOUR WITHOUT A BREAK: HIGH CHANCE OF DOZING
HOW LIKELY ARE YOU TO NOD OFF OR FALL ASLEEP WHILE SITTING QUIETLY AFTER LUNCH WITHOUT ALCOHOL: SLIGHT CHANCE OF DOZING

## 2025-02-25 ENCOUNTER — OFFICE VISIT (OUTPATIENT)
Dept: PULMONOLOGY | Age: 55
End: 2025-02-25
Payer: COMMERCIAL

## 2025-02-25 VITALS
OXYGEN SATURATION: 95 % | WEIGHT: 315 LBS | HEART RATE: 93 BPM | DIASTOLIC BLOOD PRESSURE: 92 MMHG | SYSTOLIC BLOOD PRESSURE: 137 MMHG | BODY MASS INDEX: 38.36 KG/M2 | TEMPERATURE: 96.7 F | RESPIRATION RATE: 16 BRPM | HEIGHT: 76 IN

## 2025-02-25 DIAGNOSIS — J80 ARDS (ADULT RESPIRATORY DISTRESS SYNDROME) (HCC): ICD-10-CM

## 2025-02-25 DIAGNOSIS — G47.33 OSA ON CPAP: Primary | ICD-10-CM

## 2025-02-25 PROCEDURE — 3080F DIAST BP >= 90 MM HG: CPT | Performed by: INTERNAL MEDICINE

## 2025-02-25 PROCEDURE — 3075F SYST BP GE 130 - 139MM HG: CPT | Performed by: INTERNAL MEDICINE

## 2025-02-25 PROCEDURE — G2211 COMPLEX E/M VISIT ADD ON: HCPCS | Performed by: INTERNAL MEDICINE

## 2025-02-25 PROCEDURE — 99214 OFFICE O/P EST MOD 30 MIN: CPT | Performed by: INTERNAL MEDICINE

## 2025-02-25 RX ORDER — TIRZEPATIDE 15 MG/.5ML
INJECTION, SOLUTION SUBCUTANEOUS
COMMUNITY
Start: 2023-03-22

## 2025-02-25 NOTE — PROGRESS NOTES
MA Communication:  The following orders are received by verbal communication from Odell Aguilera MD    Orders include:    Pressure changed  Sleep study   Follow up 1 year- to be scheduled    
chart review, interpreting labs and images, coordinating care, medical documentation and counseling the patient on diagnosis listed above.     Subjective/Objective     Interval History: Encounter 2/25/2025  Compliance report for the interval January 19, 2025 till February 17, 2025 showed 100% use with 90% more than 4 hours a night  Average use 5 hours and 52 minutes  Pressure 8 to 12 cm water with required pressure 9-11  Median airleak of 24 L/min and AHI 1.8    Patient continues to struggle with his sleep specially between 2 and 4 in the morning where he feels that he is not getting enough breath and suffocating.  This affects his quality of life during the day.      HPI (Encounter: February 13, 2024):   53-year-old pleasant male patient who comes in for sleep apnea follow-up.  Patient has a PMH of HTN, HLD, DM, obesity class III, ADALI, severe COVID-19 pneumonia/ARDS.    Patient was seen previously by Dr. Capri osuna in May 2021 for his sleep apnea.  He was compliant 83% of the time with good control of his events.  He had previous sleep study in February 2019 that showed severe ADALI with AHI of 49 and lowest oxygen saturation 69% with hypoxia time of 94 minutes.  Patient was seen in June 2023 by GINNY Sherman and his compliance report showed 100% use the his use more than 4 hours at night was only 4%.  His AHI was controlled at 0.9.  Set the pressures 8 to 12 cm water with 95% pressure around 11 cm water.  CPAP compliance report for the.  January 6, 2024 till February 4, 2024: Patient used the device under percent of the time with 80% more than 4 hours a night.  Average use is 5 hours and 6 minutes.  Set pressure 8 to 12 cm water with median required pressure 9.2 and maximum 11.3 with median airleak of 16 L/min and maximum 51 and AHI 1.6.  Patient has been using the device every night but he does not like being tied to a device with a hose and frequent tossing and turning at night.  He wishes this he would

## 2025-02-25 NOTE — PATIENT INSTRUCTIONS
CPAP pressure changed from auto CPAP 8-12 fixed pressure 12 cm water to try in the coming week  I suspect that the patient might have some hypoventilation especially with the problem happening between 2 and 4 in the morning which is likely REM sleep time.  Will arrange for in-lab titration study to see if the patient needs a BiPAP  Sleep hygiene, CPAP cleaning, desensitization, safety precautions education provided.  Follow-up in 12 months      Health Maintenance/Preventive measures:        >>  Avoid smoking, vaping or secondhand exposure.  Avoid exposure to irritants, allergens as possible as well as contact with patients with infectious respiratory illness.        >>  Stay up-to-date with influenza & pneumonia vaccines, RSV, & COVID-19 vaccine as recommended by the Advisory Committee on Immunization Practices (ACIP)        >>  Healthy diet and activity as able.        >>  Acid reflux precautions: Head of bed elevation, avoiding tight clothes, avoiding big meals or snacking 3 hours before bedtime, targeting healthy weight.        >>  Practice sleep hygiene measures. Avoid driving or operating heavy machines if tired or sleepy.       Address to Sleep Center:  [facility] [address] [place] [address], [address], [place] [address]            Phone: [phone] Fax: [phone]    If you should need to cancel or reschedule your appointment, please call the Sleep Center at [phone] as soon as possible.     We ask that you please phone the [facility] Patient Pre-Services ([phone]) at least 3-5 days prior to your sleep study to pre-register. Failing to pre-register may ultimately cause your insurance to not pay for this procedure.     Please refrain from or reduce the use of caffeine and/or alcohol prior to your sleep study and avoid napping the day of your study as these will affect the accuracy of your test results.

## 2025-03-24 ENCOUNTER — HOSPITAL ENCOUNTER (OUTPATIENT)
Dept: SLEEP CENTER | Age: 55
Discharge: HOME | End: 2025-03-26
Payer: COMMERCIAL

## 2025-03-24 DIAGNOSIS — G47.33 OSA ON CPAP: ICD-10-CM

## 2025-03-24 PROCEDURE — 95811 POLYSOM 6/>YRS CPAP 4/> PARM: CPT

## 2025-03-27 ENCOUNTER — RESULTS FOLLOW-UP (OUTPATIENT)
Dept: PULMONOLOGY | Age: 55
End: 2025-03-27

## 2025-03-28 ENCOUNTER — TELEPHONE (OUTPATIENT)
Dept: PULMONOLOGY | Age: 55
End: 2025-03-28

## 2025-03-28 DIAGNOSIS — G47.33 OSA (OBSTRUCTIVE SLEEP APNEA): Primary | ICD-10-CM

## 2025-03-28 NOTE — TELEPHONE ENCOUNTER
----- Message from NADIR HARTMAN MA sent at 2/25/2025  9:02 AM EST -----  Regarding: compliance report  Please pull compliance report after pressure change

## 2025-04-07 ENCOUNTER — TRANSCRIBE ORDERS (OUTPATIENT)
Dept: ADMINISTRATIVE | Age: 55
End: 2025-04-07

## 2025-04-07 DIAGNOSIS — R06.00 DYSPNEA, UNSPECIFIED TYPE: Primary | ICD-10-CM

## 2025-04-21 ENCOUNTER — ENROLLMENT (OUTPATIENT)
Dept: INTERNAL MEDICINE | Age: 55
End: 2025-04-21

## 2025-04-21 ENCOUNTER — TELEPHONE (OUTPATIENT)
Dept: ENDOCRINOLOGY | Age: 55
End: 2025-04-21

## 2025-04-21 NOTE — TELEPHONE ENCOUNTER
Fax from [facility] sending referral to see Dr. Kayode Headley is not accepting external referrals     Dx- T2DM     Referring Deya Castellon PA-C

## 2025-04-23 ENCOUNTER — TELEPHONE (OUTPATIENT)
Dept: INTERNAL MEDICINE | Age: 55
End: 2025-04-23

## 2025-04-23 NOTE — TELEPHONE ENCOUNTER
Specialty Medication Service    Date: 4/23/2025  Patient's Name: Domenic Justin YOB: 1970            _____________________________________________________________________________________________    Patient returned call to schedule PharmD initial appointment for Specialty Medication Services. Patient scheduled 04/24/25 at 1pm .    Char Sherman CPhT  Pharmacy   Specialty Medication Services   Phone: [phone] option 4    For Pharmacy Admin Tracking Only    Program: [facility]  CPA in place:  No  Recommendation Provided To: Patient/Caregiver: 1 via Telephone  Intervention Detail: Scheduled Appointment  Intervention Accepted By: Patient/Caregiver: 1  Gap Closed?:    Time Spent (min): 15

## 2025-04-24 ENCOUNTER — TELEPHONE (OUTPATIENT)
Dept: INTERNAL MEDICINE | Age: 55
End: 2025-04-24

## 2025-04-24 ENCOUNTER — PHARMACY VISIT (OUTPATIENT)
Dept: INTERNAL MEDICINE | Age: 55
End: 2025-04-24

## 2025-04-24 DIAGNOSIS — E78.00 HYPERCHOLESTEROLEMIA: Primary | ICD-10-CM

## 2025-04-24 PROBLEM — E11.9 TYPE 2 DIABETES MELLITUS WITHOUT COMPLICATIONS (HCC): Status: ACTIVE | Noted: 2020-08-10

## 2025-04-24 PROBLEM — G47.30 SLEEP APNEA: Status: ACTIVE | Noted: 2023-06-26

## 2025-04-24 PROBLEM — R06.00 DYSPNEA: Status: ACTIVE | Noted: 2025-04-04

## 2025-04-24 PROBLEM — Z86.0100 HISTORY OF COLONIC POLYPS: Status: ACTIVE | Noted: 2025-04-24

## 2025-04-24 PROBLEM — K22.70 BARRETT'S ESOPHAGUS: Status: ACTIVE | Noted: 2025-04-24

## 2025-04-24 RX ORDER — CLOBETASOL PROPIONATE 0.5 MG/G
CREAM TOPICAL 2 TIMES DAILY PRN
COMMUNITY

## 2025-04-24 RX ORDER — EVOLOCUMAB 140 MG/ML
140 INJECTION, SOLUTION SUBCUTANEOUS
COMMUNITY
Start: 2025-04-21

## 2025-04-24 RX ORDER — BUDESONIDE AND FORMOTEROL FUMARATE DIHYDRATE 160; 4.5 UG/1; UG/1
2 AEROSOL RESPIRATORY (INHALATION) 2 TIMES DAILY
COMMUNITY
Start: 2025-04-04

## 2025-04-24 ASSESSMENT — PROMIS GLOBAL HEALTH SCALE
IN GENERAL, HOW WOULD YOU RATE YOUR PHYSICAL HEALTH [ON A SCALE OF 1 (POOR) TO 5 (EXCELLENT)]?: GOOD
IN GENERAL, HOW WOULD YOU RATE YOUR SATISFACTION WITH YOUR SOCIAL ACTIVITIES AND RELATIONSHIPS [ON A SCALE OF 1 (POOR) TO 5 (EXCELLENT)]?: VERY GOOD
IN GENERAL, WOULD YOU SAY YOUR QUALITY OF LIFE IS...[ON A SCALE OF 1 (POOR) TO 5 (EXCELLENT)]: VERY GOOD
TO WHAT EXTENT ARE YOU ABLE TO CARRY OUT YOUR EVERYDAY PHYSICAL ACTIVITIES SUCH AS WALKING, CLIMBING STAIRS, CARRYING GROCERIES, OR MOVING A CHAIR [ON A SCALE OF 1 (NOT AT ALL) TO 5 (COMPLETELY)]?: COMPLETELY
SUM OF RESPONSES TO QUESTIONS 2, 4, 5, & 10: 16
IN GENERAL, WOULD YOU SAY YOUR HEALTH IS...[ON A SCALE OF 1 (POOR) TO 5 (EXCELLENT)]: GOOD
IN THE PAST 7 DAYS, HOW WOULD YOU RATE YOUR PAIN ON AVERAGE [ON A SCALE FROM 0 (NO PAIN) TO 10 (WORST IMAGINABLE PAIN)]?: 3
IN GENERAL, PLEASE RATE HOW WELL YOU CARRY OUT YOUR USUAL SOCIAL ACTIVITIES (INCLUDES ACTIVITIES AT HOME, AT WORK, AND IN YOUR COMMUNITY, AND RESPONSIBILITIES AS A PARENT, CHILD, SPOUSE, EMPLOYEE, FRIEND, ETC) [ON A SCALE OF 1 (POOR) TO 5 (EXCELLENT)]?: VERY GOOD
IN THE PAST 7 DAYS, HOW WOULD YOU RATE YOUR FATIGUE ON AVERAGE [ON A SCALE FROM 1 (NONE) TO 5 (VERY SEVERE)]?: MILD
IN THE PAST 7 DAYS, HOW OFTEN HAVE YOU BEEN BOTHERED BY EMOTIONAL PROBLEMS, SUCH AS FEELING ANXIOUS, DEPRESSED, OR IRRITABLE [ON A SCALE FROM 1 (NEVER) TO 5 (ALWAYS)]?: RARELY
IN GENERAL, HOW WOULD YOU RATE YOUR MENTAL HEALTH, INCLUDING YOUR MOOD AND YOUR ABILITY TO THINK [ON A SCALE OF 1 (POOR) TO 5 (EXCELLENT)]?: VERY GOOD
SUM OF RESPONSES TO QUESTIONS 3, 6, 7, & 8: 15

## 2025-04-24 NOTE — TELEPHONE ENCOUNTER
Specialty Medication Service    Date: 4/24/2025  Patient's Name: Domenic Justin YOB: 1970            _____________________________________________________________________________________________    Called patient's specialist office to request most recent office visit summary and relevant labs for Specialty Medication Service formulary medication.  Faxed  to have faxed to [phone]. Sent referral form to complete also.    Char Sherman CPhT  Pharmacy   Specialty Medication Services   Phone: [phone] option 4    For Pharmacy Admin Tracking Only    Program: [facility]  CPA in place:  No  Recommendation Provided To: Provider: 2 via Fax sent to office  Intervention Detail:   Intervention Accepted By: Provider: 0  Gap Closed?:    Time Spent (min): 15

## 2025-04-24 NOTE — PROGRESS NOTES
Specialty Medication Service    Patient's Name: Domenic Justin YOB: 1970      Domenic Justin is a 55 y.o. male presenting today for Specialty Medication Service visit. Patient is prescribed [facility] formulary medication, Repatha. Medication list updated.    Specialty Medication: Repatha 140mg/mL SOSY   Frequency: Every 14 days  Indication: Hyperlipidemia  Initially Diagnosed: Unknown  Additional Therapy:   None  Previous Therapy:   Multiple statins (abdominal pain) (does not recall names)    Specialist:   Deya aGrcia  [address] [place] [address]  [phone]  Specialist Progress Note Available: No, office to fax  Last Specialist Visit: ~2 weeks per patient     Allergies   Allergen Reactions    Doxepin Other (See Comments)     Hang over feeling  fatigue    Statins Other (See Comments)     Other reaction(s): Abdominal Pain    Venlafaxine      Insomnia and sexual side effects    Wellbutrin [Bupropion Hcl] Other (See Comments)     Increased anxiety       Past Medical History:   Diagnosis Date    Cholesterol serum elevated     Depression     Diabetes mellitus (HCC)     Hypertension     Neck pain     ADALI on CPAP     Reflux       Social History     Tobacco Use    Smoking status: Never    Smokeless tobacco: Never    Tobacco comments:     DENIES, AWARE NO SMOKING DOS-DG   Substance Use Topics    Alcohol use: Not Currently     Comment: PT DENIES, AWARE TO NOT DRINK DAY BEFORE OR DOS-DG     Family History   Problem Relation Age of Onset    Lung Cancer Mother     Diabetes Father     Hypertension Father     Stroke Father     Diabetes Brother     Heart Disease Brother     Hyperthyroidism Daughter      REVIEW OF CURRENT DISEASE STATE  Hyperlipidemia: Patient has diagnosis of hyperlipidemia/CAD presents for evaluation related to specialty medication use. There is a family history of hyperlipidemia, father. There is a family history of early ischemia heart disease, father and grandmother. Current symptoms: chest

## 2025-04-25 ENCOUNTER — HOSPITAL ENCOUNTER (OUTPATIENT)
Dept: PULMONOLOGY | Age: 55
Discharge: HOME | End: 2025-04-25
Payer: COMMERCIAL

## 2025-04-25 ENCOUNTER — HOSPITAL ENCOUNTER (OUTPATIENT)
Dept: CT IMAGING | Age: 55
Discharge: HOME | End: 2025-04-25
Payer: COMMERCIAL

## 2025-04-25 DIAGNOSIS — R06.00 DYSPNEA, UNSPECIFIED TYPE: ICD-10-CM

## 2025-04-25 LAB
DLCO %PRED: 82 %
DLCO PRED: NORMAL
DLCO/VA %PRED: NORMAL
DLCO/VA PRED: NORMAL
DLCO/VA: NORMAL
DLCO: 34.2 ML/MIN/MMHG
EXPIRATORY TIME-POST: NORMAL
EXPIRATORY TIME: NORMAL
FEF 25-75 %CHNG: NORMAL
FEF 25-75 POST %PRED: NORMAL
FEF 25-75% %PRED-PRE: NORMAL
FEF 25-75% PRED: NORMAL
FEF 25-75-POST: NORMAL
FEF 25-75-PRE: NORMAL
FEV1 %PRED-POST: NORMAL
FEV1 %PRED-PRE: 80 %
FEV1 PRED: NORMAL
FEV1-POST: NORMAL
FEV1-PRE: 3.63 L
FEV1/FVC %PRED-POST: NORMAL
FEV1/FVC %PRED-PRE: 102 %
FEV1/FVC PRED: NORMAL
FEV1/FVC-POST: NORMAL
FEV1/FVC-PRE: 0.79 %
FVC %PRED-POST: NORMAL
FVC %PRED-PRE: 77 %
FVC PRED: NORMAL
FVC-POST: NORMAL
FVC-PRE: 4.62 L
GAW %PRED: NORMAL
GAW PRED: NORMAL
GAW: NORMAL
IC PRE %PRED: NORMAL
IC PRED: NORMAL
IC: NORMAL
MEP: NORMAL
MIP: NORMAL
MVV %PRED-PRE: NORMAL
MVV PRED: NORMAL
MVV-PRE: NORMAL
PEF %PRED-POST: NORMAL
PEF %PRED-PRE: NORMAL
PEF PRED: NORMAL
PEF%CHNG: NORMAL
PEF-POST: NORMAL
PEF-PRE: NORMAL
RAW %PRED: NORMAL
RAW PRED: NORMAL
RAW: NORMAL
RV PRE %PRED: NORMAL
RV PRED: NORMAL
RV: NORMAL
SVC %PRED: NORMAL
SVC PRED: NORMAL
SVC: NORMAL
TLC PRE %PRED: 77 %
TLC PRED: NORMAL
TLC: 6.34 L
VA %PRED: NORMAL
VA PRED: NORMAL
VA: NORMAL
VTG %PRED: NORMAL
VTG PRED: NORMAL
VTG: NORMAL

## 2025-04-25 PROCEDURE — 94010 BREATHING CAPACITY TEST: CPT

## 2025-04-25 PROCEDURE — 94760 N-INVAS EAR/PLS OXIMETRY 1: CPT

## 2025-04-25 PROCEDURE — 94729 DIFFUSING CAPACITY: CPT

## 2025-04-25 PROCEDURE — 94726 PLETHYSMOGRAPHY LUNG VOLUMES: CPT

## 2025-04-25 PROCEDURE — 71250 CT THORAX DX C-: CPT

## 2025-04-25 RX ORDER — ALBUTEROL SULFATE 90 UG/1
2 INHALANT RESPIRATORY (INHALATION) ONCE
Status: DISCONTINUED | OUTPATIENT
Start: 2025-04-25 | End: 2025-04-26 | Stop reason: HOSPADM

## 2025-04-25 ASSESSMENT — PULMONARY FUNCTION TESTS
FVC_PRE: 4.62
FEV1/FVC_PERCENT_PREDICTED_PRE: 102
FEV1_PRE: 3.63
FEV1_PERCENT_PREDICTED_PRE: 80
FVC_PERCENT_PREDICTED_PRE: 77
FEV1/FVC_PRE: 0.79

## 2025-04-26 NOTE — TELEPHONE ENCOUNTER
Specialty Medication Service    Date: 4/25/2025  Patient's Name: Domenic Justin YOB: 1970            _____________________________________________________________________________________________    Chart notes added to  '    Katie Headley CPhT  Clinical    Specialty Medication Services  Phone: [phone] option #4  Fax: [phone]    For Pharmacy Admin Tracking Only    Program: [facility]  CPA in place:  No  Recommendation Provided To: Provider: 1 via Fax sent to office  Intervention Detail:   Intervention Accepted By: Provider: 1  Gap Closed?:    Time Spent (min): 10

## 2025-04-26 NOTE — PROCEDURES
[address] [address]                           PULMONARY FUNCTION      PATIENT NAME: ЕЛЕНА LANDON                    : 1970  MED REC NO: [id number]                      ROOM:   ACCOUNT NO: [id number]                       ADMIT DATE: 2025  PROVIDER: Dale aPrkinson MD      DATE OF PROCEDURE: 2025    SURGEON:  Dale Parkinson MD    REFERRING PHYSICIAN:  RODNEY MERCHANT    INDICATION:  Dyspnea.    FINDINGS:    1. Spirometry:  The FVC is 77% of predicted.  The FEV1 is 80% of predicted.  The FEV1/FVC ratio is 0.79.  2. Plethysmography:  The total lung capacity is 77% of predicted.  3. The diffusion capacity to carbon monoxide is 82% of predicted.  4. The flow volume loop is within normal limits.    IMPRESSION:  This patient does not have an obstructive defect on spirometry.  The patient does have a mild restrictive defect, which could certainly be impacted by the BMI, but clinical correlation is recommended.  The DLCO is within normal limits.          DALE PARKINSON MD      D:  2025 18:11:17     T:  2025 18:29:27     /AGUILAR  Job #:  [id number]     Doc#:  [id number]

## 2025-05-19 ASSESSMENT — PROMIS GLOBAL HEALTH SCALE
IN GENERAL, WOULD YOU SAY YOUR QUALITY OF LIFE IS...[ON A SCALE OF 1 (POOR) TO 5 (EXCELLENT)]: VERY GOOD
IN GENERAL, HOW WOULD YOU RATE YOUR SATISFACTION WITH YOUR SOCIAL ACTIVITIES AND RELATIONSHIPS [ON A SCALE OF 1 (POOR) TO 5 (EXCELLENT)]?: GOOD
TO WHAT EXTENT ARE YOU ABLE TO CARRY OUT YOUR EVERYDAY PHYSICAL ACTIVITIES SUCH AS WALKING, CLIMBING STAIRS, CARRYING GROCERIES, OR MOVING A CHAIR [ON A SCALE OF 1 (NOT AT ALL) TO 5 (COMPLETELY)]?: COMPLETELY
IN THE PAST 7 DAYS, HOW WOULD YOU RATE YOUR PAIN ON AVERAGE [ON A SCALE FROM 0 (NO PAIN) TO 10 (WORST IMAGINABLE PAIN)]?: 4
IN GENERAL, PLEASE RATE HOW WELL YOU CARRY OUT YOUR USUAL SOCIAL ACTIVITIES (INCLUDES ACTIVITIES AT HOME, AT WORK, AND IN YOUR COMMUNITY, AND RESPONSIBILITIES AS A PARENT, CHILD, SPOUSE, EMPLOYEE, FRIEND, ETC) [ON A SCALE OF 1 (POOR) TO 5 (EXCELLENT)]?: EXCELLENT
IN GENERAL, PLEASE RATE HOW WELL YOU CARRY OUT YOUR USUAL SOCIAL ACTIVITIES (INCLUDES ACTIVITIES AT HOME, AT WORK, AND IN YOUR COMMUNITY, AND RESPONSIBILITIES AS A PARENT, CHILD, SPOUSE, EMPLOYEE, FRIEND, ETC) [ON A SCALE OF 1 (POOR) TO 5 (EXCELLENT)]?: EXCELLENT
WHO IS THE PERSON COMPLETING THE PROMIS V1.1 SURVEY?: SELF
IN GENERAL, HOW WOULD YOU RATE YOUR MENTAL HEALTH, INCLUDING YOUR MOOD AND YOUR ABILITY TO THINK [ON A SCALE OF 1 (POOR) TO 5 (EXCELLENT)]?: EXCELLENT
IN GENERAL, WOULD YOU SAY YOUR HEALTH IS...[ON A SCALE OF 1 (POOR) TO 5 (EXCELLENT)]: GOOD
IN THE PAST 7 DAYS, HOW WOULD YOU RATE YOUR PAIN ON AVERAGE [ON A SCALE FROM 0 (NO PAIN) TO 10 (WORST IMAGINABLE PAIN)]?: 4
IN THE PAST 7 DAYS, HOW OFTEN HAVE YOU BEEN BOTHERED BY EMOTIONAL PROBLEMS, SUCH AS FEELING ANXIOUS, DEPRESSED, OR IRRITABLE [ON A SCALE FROM 1 (NEVER) TO 5 (ALWAYS)]?: NEVER
IN THE PAST 7 DAYS, HOW WOULD YOU RATE YOUR FATIGUE ON AVERAGE [ON A SCALE FROM 1 (NONE) TO 5 (VERY SEVERE)]?: MILD
IN THE PAST 7 DAYS, HOW OFTEN HAVE YOU BEEN BOTHERED BY EMOTIONAL PROBLEMS, SUCH AS FEELING ANXIOUS, DEPRESSED, OR IRRITABLE [ON A SCALE FROM 1 (NEVER) TO 5 (ALWAYS)]?: NEVER
IN THE PAST 7 DAYS, HOW WOULD YOU RATE YOUR FATIGUE ON AVERAGE [ON A SCALE FROM 1 (NONE) TO 5 (VERY SEVERE)]?: MILD
IN GENERAL, HOW WOULD YOU RATE YOUR SATISFACTION WITH YOUR SOCIAL ACTIVITIES AND RELATIONSHIPS [ON A SCALE OF 1 (POOR) TO 5 (EXCELLENT)]?: GOOD
TO WHAT EXTENT ARE YOU ABLE TO CARRY OUT YOUR EVERYDAY PHYSICAL ACTIVITIES SUCH AS WALKING, CLIMBING STAIRS, CARRYING GROCERIES, OR MOVING A CHAIR [ON A SCALE OF 1 (NOT AT ALL) TO 5 (COMPLETELY)]?: COMPLETELY
HOW IS THE PROMIS V1.1 BEING ADMINISTERED?: ELECTRONIC
SUM OF RESPONSES TO QUESTIONS 2, 4, 5, & 10: 17
IN GENERAL, HOW WOULD YOU RATE YOUR PHYSICAL HEALTH [ON A SCALE OF 1 (POOR) TO 5 (EXCELLENT)]?: GOOD
SUM OF RESPONSES TO QUESTIONS 3, 6, 7, & 8: 16
HOW IS THE PROMIS V1.1 BEING ADMINISTERED?: ELECTRONIC
WHO IS THE PERSON COMPLETING THE PROMIS V1.1 SURVEY?: SELF

## 2025-05-22 ENCOUNTER — PHARMACY VISIT (OUTPATIENT)
Age: 55
End: 2025-05-22

## 2025-05-22 DIAGNOSIS — E78.00 HYPERCHOLESTEROLEMIA: Primary | ICD-10-CM

## 2025-05-23 RX ORDER — EVOLOCUMAB 140 MG/ML
140 INJECTION, SOLUTION SUBCUTANEOUS
Qty: 6 ML | Refills: 1 | Status: SHIPPED | OUTPATIENT
Start: 2025-05-23

## 2025-05-23 NOTE — PROGRESS NOTES
Initial Specialty Medication Virtual Visit  [facility]  [facility]  [address] [place] [address]  Dept: [phone]  Dept Fax: [phone]  Date of patient's visit: 5/23/2025  Patient's Name:  Domenic Justin YOB: 1970            Patient Care Team:  Deya Garcia PA-C as PCP - General (Physician Assistant)  Oswald Farooq MD as Consulting Physician (Pulmonology)  ================================================================    REASON FOR VISIT/CHIEF COMPLAINT:  Medication Check (SMS Repatha)      HISTORY OF PRESENTING ILLNESS:  Domenic Justin is 55 y.o. is here for initial virtual visit for specialty medication.      Specialty Medication: Repatha 140mg/mL SOSY   Frequency: Every 14 days  Indication: Hyperlipidemia  Initially Diagnosed: Unknown  Additional Therapy:   None  Previous Therapy:   Multiple statins (abdominal pain) (does not recall names)     Specialist:   Deya Garcia  [address] [place] [address]  [phone]    Domenic Justin has no new complain today. Denies any acute complaint        DIAGNOSTIC FINDINGS:  CBC:  Lab Results   Component Value Date/Time    WBC 6.7 04/07/2025 08:06 AM    HGB 14.8 04/07/2025 08:06 AM     04/07/2025 08:06 AM       BMP:    Lab Results   Component Value Date/Time     04/07/2025 08:06 AM    K 4.4 04/07/2025 08:06 AM    K 3.9 02/19/2024 07:11 AM     04/07/2025 08:06 AM    CO2 27 04/07/2025 08:06 AM    BUN 18 04/07/2025 08:06 AM    CREATININE 0.8 04/07/2025 08:06 AM    GLUCOSE 147 04/07/2025 08:06 AM       HEMOGLOBIN A1C:   Lab Results   Component Value Date/Time    LABA1C 6.2 04/07/2025 08:06 AM       FASTING LIPID PANEL:  Lab Results   Component Value Date    CHOL 193 04/07/2025    HDL 31 (L) 04/07/2025    TRIG 361 (H) 04/07/2025       No results found for: \"VIDHYA\"    No results found for: \"HAV\", \"HEPAIGM\", \"HEPBIGM\", \"HEPBCAB\", \"HBEAG\", \"HEPCAB\"        Patient Active Problem List   Diagnosis

## 2025-05-25 NOTE — PROGRESS NOTES
PULMONARY CLINIC NOTE      Domenic Justin   : 1970  MRN: [id number]     Date of Service: 2025    PCP: Deya Garcia PA-C    Referring provider: No ref. provider found      Chief Complaint   Patient presents with    Sleep Apnea     31-90 day           ASSESSMENT & PLAN       55 y.o. pleasant  male patient with:    # ADALI, severe with nocturnal hypoxia. AHI of 49 and lowest oxygen saturation 69% with hypoxia time of 94 minutes.  Good adherence, benefit and control.    Titrated to BiPAP 15/11 with 2 L of oxygen in 2025  # PLMD/RLS  # Inadequate sleep hygiene  # History of COVID-19 pneumonia/ARDS 2022  # Right upper lobe nodule.  5 mm.  Calcified and stable since .  Likely granuloma  # Metabolic syndrome: HTN, DM, HLD, body habitus  # Nonsustained VT  # Deconditioning  Reviewed results of the PFTs and CT scan with the patient and reassured him.  Continue BiPAP therapy with the current settings.  Okay to increase flow on the oxygen to 3 L due to long oxygen tubing to avoid noise from oxygen concentrator  Will check iron level and start patient on low-dose gabapentin at bedtime to help with restless legs and chronic knee pain  Patient is planning to get knee replacement  Weight loss efforts encouraged.  Referral to Dr. Ana Headley/diabetes per patient request  Follow-up in 6 months    I spent total of 20 minutes on this encounter on physical exam, chart review, interpreting labs and images, coordinating care, medical documentation and counseling the patient on diagnosis listed above.     Subjective/Objective     Interval History: Encounter 2025  []Since last visit the patient underwent in-lab titration study in 2025 that recommended BiPAP 15/11 with 2 L of oxygen bled in.  Patient also had RLS and PLMD.    Patient also completed a pulmonary function test due to difficulties with breathing that showed mild restriction with normal DLCO suggesting extrapulmonary restriction

## 2025-06-03 ASSESSMENT — SLEEP AND FATIGUE QUESTIONNAIRES
HOW LIKELY ARE YOU TO NOD OFF OR FALL ASLEEP WHILE SITTING AND READING: MODERATE CHANCE OF DOZING
HOW LIKELY ARE YOU TO NOD OFF OR FALL ASLEEP IN A CAR, WHILE STOPPED FOR A FEW MINUTES IN TRAFFIC: WOULD NEVER DOZE
HOW LIKELY ARE YOU TO NOD OFF OR FALL ASLEEP IN A CAR, WHILE STOPPED FOR A FEW MINUTES IN TRAFFIC: WOULD NEVER DOZE
HOW LIKELY ARE YOU TO NOD OFF OR FALL ASLEEP WHILE SITTING AND TALKING TO SOMEONE: WOULD NEVER DOZE
ESS TOTAL SCORE: 10
HOW LIKELY ARE YOU TO NOD OFF OR FALL ASLEEP WHILE SITTING AND TALKING TO SOMEONE: WOULD NEVER DOZE
HOW LIKELY ARE YOU TO NOD OFF OR FALL ASLEEP WHILE LYING DOWN TO REST IN THE AFTERNOON WHEN CIRCUMSTANCES PERMIT: MODERATE CHANCE OF DOZING
HOW LIKELY ARE YOU TO NOD OFF OR FALL ASLEEP WHILE SITTING QUIETLY AFTER LUNCH WITHOUT ALCOHOL: MODERATE CHANCE OF DOZING
HOW LIKELY ARE YOU TO NOD OFF OR FALL ASLEEP WHILE WATCHING TV: MODERATE CHANCE OF DOZING
HOW LIKELY ARE YOU TO NOD OFF OR FALL ASLEEP WHEN YOU ARE A PASSENGER IN A CAR FOR AN HOUR WITHOUT A BREAK: MODERATE CHANCE OF DOZING
HOW LIKELY ARE YOU TO NOD OFF OR FALL ASLEEP WHILE WATCHING TV: MODERATE CHANCE OF DOZING
HOW LIKELY ARE YOU TO NOD OFF OR FALL ASLEEP WHILE SITTING AND READING: MODERATE CHANCE OF DOZING
HOW LIKELY ARE YOU TO NOD OFF OR FALL ASLEEP WHILE LYING DOWN TO REST IN THE AFTERNOON WHEN CIRCUMSTANCES PERMIT: MODERATE CHANCE OF DOZING
HOW LIKELY ARE YOU TO NOD OFF OR FALL ASLEEP WHILE SITTING QUIETLY AFTER LUNCH WITHOUT ALCOHOL: MODERATE CHANCE OF DOZING
HOW LIKELY ARE YOU TO NOD OFF OR FALL ASLEEP WHILE SITTING INACTIVE IN A PUBLIC PLACE: WOULD NEVER DOZE
HOW LIKELY ARE YOU TO NOD OFF OR FALL ASLEEP WHEN YOU ARE A PASSENGER IN A CAR FOR AN HOUR WITHOUT A BREAK: MODERATE CHANCE OF DOZING
HOW LIKELY ARE YOU TO NOD OFF OR FALL ASLEEP WHILE SITTING INACTIVE IN A PUBLIC PLACE: WOULD NEVER DOZE

## 2025-06-04 ENCOUNTER — OFFICE VISIT (OUTPATIENT)
Dept: PULMONOLOGY | Age: 55
End: 2025-06-04
Payer: COMMERCIAL

## 2025-06-04 VITALS
RESPIRATION RATE: 16 BRPM | WEIGHT: 315 LBS | HEART RATE: 77 BPM | HEIGHT: 76 IN | DIASTOLIC BLOOD PRESSURE: 85 MMHG | TEMPERATURE: 97.4 F | BODY MASS INDEX: 38.36 KG/M2 | OXYGEN SATURATION: 97 % | SYSTOLIC BLOOD PRESSURE: 142 MMHG

## 2025-06-04 DIAGNOSIS — G25.81 RLS (RESTLESS LEGS SYNDROME): ICD-10-CM

## 2025-06-04 DIAGNOSIS — G47.33 OSA ON CPAP: ICD-10-CM

## 2025-06-04 DIAGNOSIS — E11.638 TYPE 2 DIABETES MELLITUS WITH OTHER ORAL COMPLICATION, UNSPECIFIED WHETHER LONG TERM INSULIN USE (HCC): Primary | ICD-10-CM

## 2025-06-04 PROCEDURE — 3044F HG A1C LEVEL LT 7.0%: CPT | Performed by: INTERNAL MEDICINE

## 2025-06-04 PROCEDURE — G2211 COMPLEX E/M VISIT ADD ON: HCPCS | Performed by: INTERNAL MEDICINE

## 2025-06-04 PROCEDURE — 3077F SYST BP >= 140 MM HG: CPT | Performed by: INTERNAL MEDICINE

## 2025-06-04 PROCEDURE — 3079F DIAST BP 80-89 MM HG: CPT | Performed by: INTERNAL MEDICINE

## 2025-06-04 PROCEDURE — 99215 OFFICE O/P EST HI 40 MIN: CPT | Performed by: INTERNAL MEDICINE

## 2025-06-04 RX ORDER — GABAPENTIN 300 MG/1
300 CAPSULE ORAL NIGHTLY
Qty: 30 CAPSULE | Refills: 3 | Status: SHIPPED | OUTPATIENT
Start: 2025-06-04 | End: 2025-10-02

## 2025-06-04 RX ORDER — GABAPENTIN 100 MG/1
100 CAPSULE ORAL NIGHTLY
Qty: 7 CAPSULE | Refills: 0 | Status: SHIPPED | OUTPATIENT
Start: 2025-06-04 | End: 2025-06-11

## 2025-06-04 NOTE — PATIENT INSTRUCTIONS
Reviewed results of the PFTs and CT scan with the patient and reassured him.  Continue BiPAP therapy with the current settings.  Okay to increase flow on the oxygen to 3 L due to long oxygen tubing to avoid noise from oxygen concentrator  Will check iron level and start patient on low-dose gabapentin at bedtime to help with restless legs and chronic knee pain  Patient is planning to get knee replacement  Weight loss efforts encouraged.  Referral to Dr. Ana Headley/diabetes per patient request  Follow-up in 6 months      Health Maintenance/Preventive measures:        >>  Avoid smoking, vaping or secondhand exposure.  Avoid exposure to irritants, allergens as possible as well as contact with patients with infectious respiratory illness.        >>  Stay up-to-date with influenza & pneumonia vaccines, RSV, & COVID-19 vaccine as recommended by the Advisory Committee on Immunization Practices (ACIP)        >>  Healthy diet and activity as able.        >>  Acid reflux precautions: Head of bed elevation, avoiding tight clothes, avoiding big meals or snacking 3 hours before bedtime, targeting healthy weight.        >>  Practice sleep hygiene measures. Avoid driving or operating heavy machines if tired or sleepy.

## 2025-06-04 NOTE — PROGRESS NOTES
MA Communication:  The following orders are received by verbal communication from Odell Aguilera MD    Orders include:    6 month

## 2025-06-05 ENCOUNTER — TELEPHONE (OUTPATIENT)
Dept: ENDOCRINOLOGY | Age: 55
End: 2025-06-05

## 2025-06-16 ENCOUNTER — OFFICE VISIT (OUTPATIENT)
Age: 55
End: 2025-06-16

## 2025-06-16 VITALS
DIASTOLIC BLOOD PRESSURE: 74 MMHG | HEIGHT: 76 IN | HEART RATE: 90 BPM | SYSTOLIC BLOOD PRESSURE: 126 MMHG | TEMPERATURE: 97.4 F | WEIGHT: 315 LBS | OXYGEN SATURATION: 96 % | BODY MASS INDEX: 38.36 KG/M2

## 2025-06-16 DIAGNOSIS — M77.8 TENDINITIS OF LEFT FOREARM: Primary | ICD-10-CM

## 2025-06-16 RX ORDER — METHYLPREDNISOLONE 4 MG/1
4 TABLET ORAL DAILY
Qty: 5 TABLET | Refills: 0 | Status: SHIPPED | OUTPATIENT
Start: 2025-06-16 | End: 2025-06-16

## 2025-06-16 RX ORDER — METHYLPREDNISOLONE 4 MG/1
4 TABLET ORAL DAILY
Qty: 5 TABLET | Refills: 0 | Status: SHIPPED | OUTPATIENT
Start: 2025-06-16 | End: 2025-06-21

## 2025-06-16 NOTE — PROGRESS NOTES
Domenic Justin (: 1970) is a 55 y.o. male, Established patient, here for evaluation of the following chief complaint(s):  Arm Pain (Possible tendon tear-left arm)        ASSESSMENT/PLAN:    ICD-10-CM    1. Tendinitis of left forearm  M77.8           Left forearm tendonitis  Apply a compressive ACE bandage.   Rest and elevate the affected painful area.   Apply cold compresses intermittently as needed.   As pain recedes, begin normal activities slowly as tolerated.    Call if symptoms persist.  Tylenol as needed for pain  Methylprednisolone prescribed to treat inflammation.   Patient already has established appointment with orthopedics on 2025  Discussed PCP follow up for persisting or worsening symptoms, or to return to the clinic if unable to obtain PCP follow up for worsening symptoms.    The patient tolerated their visit well. A time was given to answer questions and a plan was established, proposed, and was agreed upon. Reviewed AVS with treatment instructions and answered questions - patient acknowledges understanding and agreement with the discussed treatment plan and AVS instructions.      SUBJECTIVE/OBJECTIVE:  HPI:   55 y.o. male presents for complaint of left arm pain x 2 days.    Admits injury occurred yesterday when he was lifting a case of water. He went to lift the case up with both hands and head a \"pop\" from his left wrist and had pain shoot from wrist to elbow. Has had pain with certain movements since then. Left hand has been tingling since injury.   Denies numbness, radiating pain, swelling    Rest makes symptoms better.  Movement, activity makes symptoms worse.    Has attempted rest for symptoms with some relief    Pt provided HPI by themself - pt considered to be a reliable historian.        Arm Pain       VITAL SIGNS  Vitals:    25 1754   BP: 126/74   Pulse: 90   Temp: 97.4 °F (36.3 °C)   TempSrc: Temporal   SpO2: 96%   Weight: (!) 145.2 kg (320 lb)   Height: 1.93 m (6' 4\")

## 2025-06-19 ENCOUNTER — TELEPHONE (OUTPATIENT)
Age: 55
End: 2025-06-19

## 2025-06-19 DIAGNOSIS — E78.00 HYPERCHOLESTEROLEMIA: Primary | ICD-10-CM

## 2025-06-19 NOTE — TELEPHONE ENCOUNTER
Specialty Medication Service    Date: 6/19/2025  Patient's Name: Domenic Justin YOB: 1970            _____________________________________________________________________________________________     Domenic Justin is a 55 y.o.  male enrolled in the Specialty Medication Service program. Refill request received for Repatha.    Patient does have active CPA on file.   Patient last SMS pharmacist visit was within the last 6 months.  Patient last medical director visit was within the last year.  Patient has seen their specialist within the last year.   Labs were reviewed.   [facility] medical director referral is on file: yes  Next [facility] visit is scheduled 10/23/2025.  Correct [facility] department for prescribing yes    Chart reviewed. No significant concerns noted, patient is compliant with the program.     Will approve the refill for 6 months based on current CPA.      Orders Placed This Encounter    Evolocumab (REPATHA) SOAJ pen     Sig: Inject 1 mL into the skin every 14 days     Dispense:  6 mL     Refill:  1          Lencho Guo PharmD [facility]  Ambulatory Clinical Pharmacist  Specialty Medication Services  Phone: [phone]  Fax: [phone]     For Pharmacy Admin Tracking Only    Program: [facility]  CPA in place:  Yes  Recommendation Provided To: Patient/Caregiver: 1 via Telephone  Intervention Detail: Refill(s) Provided  Intervention Accepted By: Patient/Caregiver: 1  Time Spent (min): 15

## 2025-07-13 PROBLEM — E66.812 CLASS 2 SEVERE OBESITY WITH SERIOUS COMORBIDITY AND BODY MASS INDEX (BMI) OF 38.0 TO 38.9 IN ADULT (HCC): Status: ACTIVE | Noted: 2025-07-13

## 2025-07-13 PROBLEM — E66.01 CLASS 2 SEVERE OBESITY WITH SERIOUS COMORBIDITY AND BODY MASS INDEX (BMI) OF 38.0 TO 38.9 IN ADULT (HCC): Status: ACTIVE | Noted: 2025-07-13

## 2025-07-13 PROBLEM — E78.2 MIXED HYPERLIPIDEMIA: Status: ACTIVE | Noted: 2025-07-13

## 2025-07-13 PROBLEM — E11.9 CONTROLLED TYPE 2 DIABETES MELLITUS WITHOUT COMPLICATION, WITHOUT LONG-TERM CURRENT USE OF INSULIN (HCC): Status: ACTIVE | Noted: 2025-07-13

## 2025-07-14 ENCOUNTER — OFFICE VISIT (OUTPATIENT)
Dept: ENDOCRINOLOGY | Age: 55
End: 2025-07-14
Payer: COMMERCIAL

## 2025-07-14 VITALS
WEIGHT: 315 LBS | BODY MASS INDEX: 38.36 KG/M2 | SYSTOLIC BLOOD PRESSURE: 147 MMHG | HEART RATE: 74 BPM | HEIGHT: 76 IN | DIASTOLIC BLOOD PRESSURE: 89 MMHG | RESPIRATION RATE: 16 BRPM | OXYGEN SATURATION: 98 % | TEMPERATURE: 98 F

## 2025-07-14 DIAGNOSIS — I10 PRIMARY HYPERTENSION: ICD-10-CM

## 2025-07-14 DIAGNOSIS — E11.40 TYPE 2 DIABETES, CONTROLLED, WITH NEUROPATHY (HCC): Primary | ICD-10-CM

## 2025-07-14 DIAGNOSIS — E78.2 MIXED HYPERLIPIDEMIA: ICD-10-CM

## 2025-07-14 DIAGNOSIS — G47.30 SLEEP APNEA, UNSPECIFIED TYPE: ICD-10-CM

## 2025-07-14 DIAGNOSIS — E66.813 CLASS 3 SEVERE OBESITY WITH SERIOUS COMORBIDITY AND BODY MASS INDEX (BMI) OF 40.0 TO 44.9 IN ADULT, UNSPECIFIED OBESITY TYPE (HCC): ICD-10-CM

## 2025-07-14 LAB — HBA1C MFR BLD: 6.1 %

## 2025-07-14 PROCEDURE — 83036 HEMOGLOBIN GLYCOSYLATED A1C: CPT | Performed by: INTERNAL MEDICINE

## 2025-07-14 PROCEDURE — 3079F DIAST BP 80-89 MM HG: CPT | Performed by: INTERNAL MEDICINE

## 2025-07-14 PROCEDURE — 3077F SYST BP >= 140 MM HG: CPT | Performed by: INTERNAL MEDICINE

## 2025-07-14 PROCEDURE — 99204 OFFICE O/P NEW MOD 45 MIN: CPT | Performed by: INTERNAL MEDICINE

## 2025-07-14 PROCEDURE — 3044F HG A1C LEVEL LT 7.0%: CPT | Performed by: INTERNAL MEDICINE

## 2025-07-14 NOTE — PROGRESS NOTES
Domenic Justin is a 55 y.o. male, presenting for evaluation of the following:     Type 2 diabetes, controlled, with neuropathy (HCC) [E11.40]  History of Present Illness  The patient is a 55-year-old male who presents for evaluation of diabetes.  Diagnosed with Type 2 diabetes mellitus in 2020.     Comorbid conditions: hyperlipidemia, hypertension, obesity     Current diabetic medications include: Mounjaro, Glipizide 10 mg daily     Intolerance to diabetes medications: Yes Metformin severe diarrhea  Tried Ozempic, not effective     Weight trend: stable  Prior visit with dietician: yes  Current diet: on average, 2 meals per day  Current exercise: walking, works outside     Current monitoring regimen: home blood tests 0-1 times daily  Has brought blood glucose log/meter:  No  Home blood sugar records: fasting range:  and postprandial range:   Any episodes of hypoglycemia? No  Hypoglycemia frequency and time(s):    Does patient have Glucagon emergency kit? No  Does patient have rapid acting carbohydrate?  Yes  Does patient wear a medic alert bracelet or necklace?  No     He was diagnosed with diabetes in 2020. Initially, he was prescribed metformin, which caused significant diarrhea, leading to discontinuation. He has since been prescribed Mounjaro, which he reports has been effective in controlling his weight and blood sugar levels. He has not been monitoring his blood sugar levels regularly since starting Mounjaro. He has previously tried Ozempic but found Mounjaro to be more effective. Currently, he is taking glipizide 10 mg once daily without any adverse effects. He has consulted a dietitian in the past and is aware of dietary recommendations.  He does report some insomnia and joint pain. He has lost approximately 28 pounds on Mounjaro but has regained some weight. He maintains an active lifestyle, working outdoors and walking daily. He has made significant dietary changes, eliminating milk and boxed foods from his

## (undated) DEVICE — ENDO CARRY-ON PROCEDURE KIT INCLUDES SUCTION TUBING, LUBRICANT, GAUZE, BIOHAZARD STICKER, TRANSPORT PAD AND INTERCEPT BEDSIDE KIT.: Brand: ENDO CARRY-ON PROCEDURE KIT

## (undated) DEVICE — YANKAUER,BULB TIP,W/O VENT,RIGID,STERILE: Brand: MEDLINE

## (undated) DEVICE — ENDOSCOPIC KIT 2 12 FT OP4 DE2 GWN SYR

## (undated) DEVICE — CONMED SCOPE SAVER BITE BLOCK, 20X27 MM: Brand: SCOPE SAVER

## (undated) DEVICE — ELECTRODE,RADIOTRANSLUCENT,FOAM,3PK: Brand: MEDLINE

## (undated) DEVICE — FORCEP BX STD CAP 240CM RAD JAW 4

## (undated) DEVICE — CANNULA,OXY,ADULT,SUPERSOFT,W/7'TUB,SC: Brand: MEDLINE INDUSTRIES, INC.